# Patient Record
Sex: FEMALE | Race: WHITE | NOT HISPANIC OR LATINO | Employment: OTHER | URBAN - NONMETROPOLITAN AREA
[De-identification: names, ages, dates, MRNs, and addresses within clinical notes are randomized per-mention and may not be internally consistent; named-entity substitution may affect disease eponyms.]

---

## 2018-06-08 ENCOUNTER — TRANSCRIBE ORDERS (OUTPATIENT)
Dept: ADMINISTRATIVE | Facility: HOSPITAL | Age: 73
End: 2018-06-08

## 2018-06-08 DIAGNOSIS — Z78.0 POST-MENOPAUSAL: Primary | ICD-10-CM

## 2018-06-08 DIAGNOSIS — Z12.31 ENCOUNTER FOR SCREENING MAMMOGRAM FOR MALIGNANT NEOPLASM OF BREAST: ICD-10-CM

## 2019-06-14 ENCOUNTER — OFFICE VISIT (OUTPATIENT)
Dept: WOUND CARE | Facility: HOSPITAL | Age: 74
End: 2019-06-14

## 2019-06-14 ENCOUNTER — LAB REQUISITION (OUTPATIENT)
Dept: LAB | Facility: HOSPITAL | Age: 74
End: 2019-06-14

## 2019-06-14 DIAGNOSIS — Z00.00 ENCOUNTER FOR GENERAL ADULT MEDICAL EXAMINATION WITHOUT ABNORMAL FINDINGS: ICD-10-CM

## 2019-06-14 PROCEDURE — 87176 TISSUE HOMOGENIZATION CULTR: CPT | Performed by: NURSE PRACTITIONER

## 2019-06-14 PROCEDURE — 87205 SMEAR GRAM STAIN: CPT | Performed by: NURSE PRACTITIONER

## 2019-06-14 PROCEDURE — 87075 CULTR BACTERIA EXCEPT BLOOD: CPT | Performed by: NURSE PRACTITIONER

## 2019-06-14 PROCEDURE — G0463 HOSPITAL OUTPT CLINIC VISIT: HCPCS

## 2019-06-14 PROCEDURE — 87070 CULTURE OTHR SPECIMN AEROBIC: CPT | Performed by: NURSE PRACTITIONER

## 2019-06-17 ENCOUNTER — TRANSCRIBE ORDERS (OUTPATIENT)
Dept: ADMINISTRATIVE | Facility: HOSPITAL | Age: 74
End: 2019-06-17

## 2019-06-17 DIAGNOSIS — I87.2 CHRONIC VENOUS INSUFFICIENCY: Primary | ICD-10-CM

## 2019-06-17 LAB
BACTERIA SPEC AEROBE CULT: ABNORMAL
GRAM STN SPEC: ABNORMAL

## 2019-06-19 LAB — BACTERIA SPEC ANAEROBE CULT: NORMAL

## 2019-06-20 ENCOUNTER — TELEPHONE (OUTPATIENT)
Dept: VASCULAR SURGERY | Facility: CLINIC | Age: 74
End: 2019-06-20

## 2019-06-21 ENCOUNTER — OFFICE VISIT (OUTPATIENT)
Dept: WOUND CARE | Facility: HOSPITAL | Age: 74
End: 2019-06-21

## 2019-06-24 ENCOUNTER — HOSPITAL ENCOUNTER (OUTPATIENT)
Dept: ULTRASOUND IMAGING | Facility: HOSPITAL | Age: 74
Discharge: HOME | End: 2019-06-24
Admitting: NURSE PRACTITIONER

## 2019-06-24 ENCOUNTER — OFFICE VISIT (OUTPATIENT)
Dept: WOUND CARE | Facility: HOSPITAL | Age: 74
End: 2019-06-24

## 2019-06-24 ENCOUNTER — APPOINTMENT (OUTPATIENT)
Dept: WOUND CARE | Facility: HOSPITAL | Age: 74
End: 2019-06-24

## 2019-06-24 DIAGNOSIS — I87.2 CHRONIC VENOUS INSUFFICIENCY: ICD-10-CM

## 2019-06-24 PROCEDURE — 93970 EXTREMITY STUDY: CPT | Performed by: SURGERY

## 2019-06-24 PROCEDURE — 93970 EXTREMITY STUDY: CPT

## 2019-06-28 ENCOUNTER — OFFICE VISIT (OUTPATIENT)
Dept: WOUND CARE | Facility: HOSPITAL | Age: 74
End: 2019-06-28

## 2019-07-01 RX ORDER — IBUPROFEN 200 MG
200 TABLET ORAL EVERY 6 HOURS PRN
Status: ON HOLD | COMMUNITY
End: 2021-01-01

## 2019-07-01 RX ORDER — DOCUSATE SODIUM 100 MG/1
100 CAPSULE, LIQUID FILLED ORAL 2 TIMES DAILY
COMMUNITY

## 2019-07-01 RX ORDER — CHOLECALCIFEROL (VITAMIN D3) 25 MCG
1000 CAPSULE ORAL DAILY
Status: ON HOLD | COMMUNITY
End: 2021-01-01

## 2019-07-01 RX ORDER — CIPROFLOXACIN 500 MG/1
500 TABLET, FILM COATED ORAL 2 TIMES DAILY
COMMUNITY
End: 2019-07-16

## 2019-07-01 RX ORDER — LEVOTHYROXINE SODIUM 0.05 MG/1
50 TABLET ORAL DAILY
Status: ON HOLD | COMMUNITY
End: 2021-01-01

## 2019-07-01 RX ORDER — OXYCODONE AND ACETAMINOPHEN 10; 325 MG/1; MG/1
1 TABLET ORAL EVERY 8 HOURS PRN
COMMUNITY
End: 2022-01-01 | Stop reason: HOSPADM

## 2019-07-01 RX ORDER — UBIDECARENONE 100 MG
100 CAPSULE ORAL DAILY
Status: ON HOLD | COMMUNITY
End: 2021-01-01

## 2019-07-01 RX ORDER — VERAPAMIL HYDROCHLORIDE 80 MG/1
80 TABLET ORAL 2 TIMES DAILY
Status: ON HOLD | COMMUNITY
End: 2021-01-01

## 2019-07-01 RX ORDER — TRIAMTERENE AND HYDROCHLOROTHIAZIDE 75; 50 MG/1; MG/1
1 TABLET ORAL DAILY
COMMUNITY
End: 2022-01-01 | Stop reason: HOSPADM

## 2019-07-05 ENCOUNTER — OFFICE VISIT (OUTPATIENT)
Dept: WOUND CARE | Facility: HOSPITAL | Age: 74
End: 2019-07-05

## 2019-07-10 ENCOUNTER — TELEPHONE (OUTPATIENT)
Dept: VASCULAR SURGERY | Facility: CLINIC | Age: 74
End: 2019-07-10

## 2019-07-10 NOTE — TELEPHONE ENCOUNTER
Tried calling pt about upcoming appt 7/16 @ 215pm with dr caballero. Tried leaving pt voicemail but was not optional.

## 2019-07-11 ENCOUNTER — OFFICE VISIT (OUTPATIENT)
Dept: WOUND CARE | Facility: HOSPITAL | Age: 74
End: 2019-07-11

## 2019-07-11 ENCOUNTER — LAB REQUISITION (OUTPATIENT)
Dept: LAB | Facility: HOSPITAL | Age: 74
End: 2019-07-11

## 2019-07-11 DIAGNOSIS — Z00.00 ENCOUNTER FOR GENERAL ADULT MEDICAL EXAMINATION WITHOUT ABNORMAL FINDINGS: ICD-10-CM

## 2019-07-11 PROCEDURE — 87186 SC STD MICRODIL/AGAR DIL: CPT | Performed by: SURGERY

## 2019-07-11 PROCEDURE — G0463 HOSPITAL OUTPT CLINIC VISIT: HCPCS

## 2019-07-11 PROCEDURE — 87070 CULTURE OTHR SPECIMN AEROBIC: CPT | Performed by: SURGERY

## 2019-07-11 PROCEDURE — 87205 SMEAR GRAM STAIN: CPT | Performed by: SURGERY

## 2019-07-11 PROCEDURE — 87185 SC STD ENZYME DETCJ PER NZM: CPT | Performed by: SURGERY

## 2019-07-11 PROCEDURE — 87077 CULTURE AEROBIC IDENTIFY: CPT | Performed by: SURGERY

## 2019-07-15 LAB
BACTERIA SPEC AEROBE CULT: ABNORMAL
GRAM STN SPEC: ABNORMAL
GRAM STN SPEC: ABNORMAL

## 2019-07-16 ENCOUNTER — OFFICE VISIT (OUTPATIENT)
Dept: VASCULAR SURGERY | Facility: CLINIC | Age: 74
End: 2019-07-16

## 2019-07-16 VITALS
DIASTOLIC BLOOD PRESSURE: 80 MMHG | BODY MASS INDEX: 36.83 KG/M2 | HEART RATE: 85 BPM | WEIGHT: 243 LBS | HEIGHT: 68 IN | SYSTOLIC BLOOD PRESSURE: 130 MMHG | OXYGEN SATURATION: 91 %

## 2019-07-16 DIAGNOSIS — L97.919 CHRONIC VENOUS HYPERTENSION WITH ULCER AND INFLAMMATION INVOLVING RIGHT SIDE (HCC): Primary | ICD-10-CM

## 2019-07-16 DIAGNOSIS — I87.331 CHRONIC VENOUS HYPERTENSION WITH ULCER AND INFLAMMATION INVOLVING RIGHT SIDE (HCC): Primary | ICD-10-CM

## 2019-07-16 DIAGNOSIS — I87.2 VENOUS INSUFFICIENCY (CHRONIC) (PERIPHERAL): ICD-10-CM

## 2019-07-16 DIAGNOSIS — E78.00 PURE HYPERCHOLESTEROLEMIA: ICD-10-CM

## 2019-07-16 DIAGNOSIS — I10 ESSENTIAL (PRIMARY) HYPERTENSION: ICD-10-CM

## 2019-07-16 DIAGNOSIS — E11.65 TYPE 2 DIABETES MELLITUS WITH HYPERGLYCEMIA, UNSPECIFIED WHETHER LONG TERM INSULIN USE (HCC): ICD-10-CM

## 2019-07-16 PROCEDURE — 99204 OFFICE O/P NEW MOD 45 MIN: CPT | Performed by: SURGERY

## 2019-07-16 NOTE — PROGRESS NOTES
07/16/2019      Ann Elena APRN  [address] [address]    Alissa Dominguez  1945    Chief Complaint   Patient presents with   • Establish Care     referral from wound care for non-healing wound bilateral lower leg -US Venous Doppler Lower Extremity Bilateral 6/24/19       Dear HERMES Carlton    HPI  I had the pleasure of seeing your patient Alissa Dominguez in the office today.  Thank you kindly for this consultation.  As you recall, Alissa Dominguez is a 73 y.o.  female who you are currently following for wounds to bilateral lower extremities.  She has been in [facility] therapy for about a month now.  She does have a history of previous vein stripping to her right lower extremity.  She states the wounds have been present for a month and a half.  She states right leg looks worse even with compression therapy.  She did have cultures taken a few days ago that we reviewed in the office today.      Past Medical History:   Diagnosis Date   • Cellulitis of left lower limb    • Cellulitis of right lower limb    • COPD (chronic obstructive pulmonary disease) (CMS/HCC)    • DJD (degenerative joint disease), cervical     DJD C Spine/Spondylolysis, Cervical Region   • Essential (primary) hypertension    • GERD (gastroesophageal reflux disease)    • Hypothyroidism    • Major depressive disorder, recurrent, moderate (CMS/HCC)    • Morbid (severe) obesity due to excess calories (CMS/HCC)    • Obstructive sleep apnea    • Primary generalized (osteo)arthritis    • Pure hypercholesterolemia    • Tinea unguium    • Type 2 diabetes mellitus with hyperglycemia (CMS/HCC)    • Venous insufficiency (chronic) (peripheral)        Past Surgical History:   Procedure Laterality Date   • CATARACT EXTRACTION, BILATERAL     • ESSURE TUBAL LIGATION     • HYSTERECTOMY     • VARICOSE VEIN SURGERY Right        Family History   Problem Relation Age of Onset   • Hypertension Sister        Social History      Socioeconomic History   • Marital status:      Spouse name: Not on file   • Number of children: Not on file   • Years of education: Not on file   • Highest education level: Not on file   Tobacco Use   • Smoking status: Current Every Day Smoker     Packs/day: 1.00     Types: Electronic Cigarette   • Smokeless tobacco: Never Used   Substance and Sexual Activity   • Alcohol use: No     Frequency: Never   • Drug use: No   • Sexual activity: Defer     Allergies   Allergen Reactions   • Augmentin [Amoxicillin-Pot Clavulanate] Other (See Comments)     Not Specified   • Ceclor [Cefaclor] Other (See Comments)     Not Specified     • Codeine Other (See Comments)     Not Specified   • Darvon [Propoxyphene] Other (See Comments)     Not Specified   • Fulvicin P-G [Griseofulvin] Other (See Comments)     Not Specified   • Niacin Other (See Comments)     Not Specified   • Valium [Diazepam] Other (See Comments)     Not Specified        Current Outpatient Medications:   •  Calcium Carb-Cholecalciferol (CALCIUM 600+D3) 600-200 MG-UNIT tablet, Take  by mouth., Disp: , Rfl:   •  Cholecalciferol (VITAMIN D-3) 1000 units capsule, Take 1,000 Units by mouth Daily., Disp: , Rfl:   •  coenzyme Q10 100 MG capsule, Take 100 mg by mouth Daily., Disp: , Rfl:   •  docusate sodium (COLACE) 100 MG capsule, Take 100 mg by mouth 2 (Two) Times a Day., Disp: , Rfl:   •  Fexofenadine HCl (MUCINEX ALLERGY PO), Take 1,200 mg by mouth Daily., Disp: , Rfl:   •  Glucosamine-Chondroitin--400-200 MG tablet, Take  by mouth., Disp: , Rfl:   •  ibuprofen (ADVIL,MOTRIN) 200 MG tablet, Take 200 mg by mouth Every 6 (Six) Hours As Needed for Mild Pain ., Disp: , Rfl:   •  levothyroxine (SYNTHROID, LEVOTHROID) 50 MCG tablet, Take 50 mcg by mouth Daily., Disp: , Rfl:   •  O2 (OXYGEN), Inhale 2 L/min 1 (One) Time., Disp: , Rfl:   •  oxyCODONE-acetaminophen (PERCOCET)  MG per tablet, Take 1 tablet by mouth Every 6 (Six) Hours As Needed for Moderate  "Pain ., Disp: , Rfl:   •  triamterene-hydrochlorothiazide (MAXZIDE) 75-50 MG per tablet, Take 1 tablet by mouth Daily., Disp: , Rfl:   •  verapamil (CALAN) 80 MG tablet, Take 80 mg by mouth 2 (Two) Times a Day., Disp: , Rfl:         Review of Systems   Constitutional: Negative.    HENT: Negative.    Eyes: Negative.    Respiratory: Negative.    Cardiovascular: Positive for leg swelling.   Gastrointestinal: Negative.    Endocrine: Negative.    Genitourinary: Negative.    Musculoskeletal: Negative.    Skin: Positive for color change and wound.   Allergic/Immunologic: Negative.    Neurological: Negative.    Hematological: Negative.    Psychiatric/Behavioral: Negative.    All other systems reviewed and are negative.    /80 (BP Location: Right arm, Patient Position: Sitting, Cuff Size: Adult)   Pulse 85   Ht 171.5 cm (67.5\")   Wt 110 kg (243 lb)   SpO2 91%   BMI 37.50 kg/m²     Physical Exam   Constitutional: She is oriented to person, place, and time. She appears well-developed and well-nourished.   HENT:   Head: Normocephalic and atraumatic.   Eyes: Pupils are equal, round, and reactive to light. No scleral icterus.   Neck: Neck supple. No JVD present. Carotid bruit is not present. No thyromegaly present.   Cardiovascular: Normal rate, regular rhythm and normal heart sounds.   Pulses:       Carotid pulses are 2+ on the right side, and 2+ on the left side.       Femoral pulses are 2+ on the right side, and 2+ on the left side.       Popliteal pulses are 2+ on the right side, and 2+ on the left side.        Dorsalis pedis pulses are 2+ on the right side, and 2+ on the left side.        Posterior tibial pulses are 2+ on the right side, and 2+ on the left side.   Wounds to bilateral lower extremtiies   Pulmonary/Chest: Effort normal and breath sounds normal.   Abdominal: Soft. Bowel sounds are normal. She exhibits no distension, no abdominal bruit and no mass. There is no hepatosplenomegaly. There is no " tenderness.   Musculoskeletal: Normal range of motion. She exhibits edema.        Legs:  Lymphadenopathy:     She has no cervical adenopathy.   Neurological: She is alert and oriented to person, place, and time. She has normal strength. No cranial nerve deficit or sensory deficit.   Skin: Skin is warm, dry and intact.   Psychiatric: She has a normal mood and affect. Her behavior is normal. Judgment and thought content normal.   Nursing note and vitals reviewed.      Patient Active Problem List   Diagnosis   • Venous insufficiency (chronic) (peripheral)   • Type 2 diabetes mellitus with hyperglycemia (CMS/[facility])   • Pure hypercholesterolemia   • Essential (primary) hypertension        Diagnosis Plan   1. Chronic venous hypertension with ulcer and inflammation involving right side (CMS/[facility])     2. Venous insufficiency (chronic) (peripheral)     3. Pure hypercholesterolemia     4. Type 2 diabetes mellitus with hyperglycemia, unspecified whether long term insulin use (CMS/[facility])     5. Essential (primary) hypertension         Plan: After thoroughly evaluating Alissa Dominguez, I believe the best course of action is to proceed with dual antibiotic therapy.  We will start her on Levaquin as well as gentamicin cream to the wound.  We will place her back in compression with Ace wraps so she can check the wound daily.  She can follow-up back in wound care.  There is no further treatment necessary from a vascular standpoint.  Her veins have been stripped and she has palpable pedal pulses.  The patient is to continue taking their medications as previously discussed. I did discuss vascular risk factors as they pertain to the progression of vascular disease including controlling hypertension and hypercholesterolemia.  This was all discussed in full with complete understanding.  Thank you for allowing me to participate in the care of your patient.  Please do not hesitate to call with any questions or concerns.  We will keep you aware  of any further encounters with Alissa Dominguez.        Sincerely yours,         Kwame Corado, Roland Shaw MD

## 2019-07-17 ENCOUNTER — TELEPHONE (OUTPATIENT)
Dept: PODIATRY | Facility: CLINIC | Age: 74
End: 2019-07-17

## 2019-07-17 NOTE — TELEPHONE ENCOUNTER
Patient's daughter called regarding prescription that Dr. Ragland prescribed yesterday during her appointment with Dr. Corado. Wound Care had sent her daughter to our office, stating that our office would need to take care of this. I explained that I did not have any history of the prescription in our computer system. Ms. Lind stated that she would call wound care back.

## 2019-07-18 ENCOUNTER — OFFICE VISIT (OUTPATIENT)
Dept: WOUND CARE | Facility: HOSPITAL | Age: 74
End: 2019-07-18

## 2019-07-18 ENCOUNTER — TRANSCRIBE ORDERS (OUTPATIENT)
Dept: SLEEP MEDICINE | Facility: HOSPITAL | Age: 74
End: 2019-07-18

## 2019-07-18 DIAGNOSIS — R18.8 OTHER ASCITES: ICD-10-CM

## 2019-07-18 DIAGNOSIS — R60.9 EDEMA, UNSPECIFIED TYPE: ICD-10-CM

## 2019-07-18 DIAGNOSIS — R06.02 SOB (SHORTNESS OF BREATH): Primary | ICD-10-CM

## 2019-07-18 PROCEDURE — G0463 HOSPITAL OUTPT CLINIC VISIT: HCPCS

## 2019-07-25 ENCOUNTER — OFFICE VISIT (OUTPATIENT)
Dept: WOUND CARE | Facility: HOSPITAL | Age: 74
End: 2019-07-25

## 2019-07-25 ENCOUNTER — HOSPITAL ENCOUNTER (OUTPATIENT)
Dept: ULTRASOUND IMAGING | Facility: HOSPITAL | Age: 74
Discharge: HOME | End: 2019-07-25
Admitting: NURSE PRACTITIONER

## 2019-07-25 ENCOUNTER — HOSPITAL ENCOUNTER (OUTPATIENT)
Dept: CARDIOLOGY | Facility: HOSPITAL | Age: 74
Discharge: HOME | End: 2019-07-25

## 2019-07-25 VITALS
WEIGHT: 242.51 LBS | HEIGHT: 68 IN | DIASTOLIC BLOOD PRESSURE: 95 MMHG | BODY MASS INDEX: 36.75 KG/M2 | SYSTOLIC BLOOD PRESSURE: 152 MMHG

## 2019-07-25 DIAGNOSIS — R60.9 EDEMA, UNSPECIFIED TYPE: ICD-10-CM

## 2019-07-25 DIAGNOSIS — R06.02 SOB (SHORTNESS OF BREATH): ICD-10-CM

## 2019-07-25 PROCEDURE — 76705 ECHO EXAM OF ABDOMEN: CPT

## 2019-07-25 PROCEDURE — 93306 TTE W/DOPPLER COMPLETE: CPT

## 2019-07-25 PROCEDURE — 93306 TTE W/DOPPLER COMPLETE: CPT | Performed by: INTERNAL MEDICINE

## 2019-07-26 LAB
BH CV ECHO MEAS - AO MAX PG (FULL): 5.4 MMHG
BH CV ECHO MEAS - AO MAX PG: 7.7 MMHG
BH CV ECHO MEAS - AO MEAN PG (FULL): 3 MMHG
BH CV ECHO MEAS - AO MEAN PG: 4 MMHG
BH CV ECHO MEAS - AO ROOT AREA (BSA CORRECTED): 1.4
BH CV ECHO MEAS - AO ROOT AREA: 7.5 CM^2
BH CV ECHO MEAS - AO ROOT DIAM: 3.1 CM
BH CV ECHO MEAS - AO V2 MAX: 139 CM/SEC
BH CV ECHO MEAS - AO V2 MEAN: 93.4 CM/SEC
BH CV ECHO MEAS - AO V2 VTI: 27.8 CM
BH CV ECHO MEAS - AVA(I,A): 2.2 CM^2
BH CV ECHO MEAS - AVA(I,D): 2.2 CM^2
BH CV ECHO MEAS - AVA(V,A): 1.9 CM^2
BH CV ECHO MEAS - AVA(V,D): 1.9 CM^2
BH CV ECHO MEAS - BSA(HAYCOCK): 2.3 M^2
BH CV ECHO MEAS - BSA: 2.2 M^2
BH CV ECHO MEAS - BZI_BMI: 37.9 KILOGRAMS/M^2
BH CV ECHO MEAS - BZI_METRIC_HEIGHT: 170.2 CM
BH CV ECHO MEAS - BZI_METRIC_WEIGHT: 109.8 KG
BH CV ECHO MEAS - EDV(MOD-SP4): 85.9 ML
BH CV ECHO MEAS - EF(MOD-SP4): 65.4 %
BH CV ECHO MEAS - ESV(MOD-SP4): 29.7 ML
BH CV ECHO MEAS - LAT PEAK E' VEL: 8.8 CM/SEC
BH CV ECHO MEAS - LV DIASTOLIC VOL/BSA (35-75): 39.2 ML/M^2
BH CV ECHO MEAS - LV MAX PG: 2.4 MMHG
BH CV ECHO MEAS - LV MEAN PG: 1 MMHG
BH CV ECHO MEAS - LV SYSTOLIC VOL/BSA (12-30): 13.5 ML/M^2
BH CV ECHO MEAS - LV V1 MAX: 76.9 CM/SEC
BH CV ECHO MEAS - LV V1 MEAN: 56.6 CM/SEC
BH CV ECHO MEAS - LV V1 VTI: 17.7 CM
BH CV ECHO MEAS - LVLD AP4: 8.3 CM
BH CV ECHO MEAS - LVLS AP4: 7.3 CM
BH CV ECHO MEAS - LVOT AREA (M): 3.5 CM^2
BH CV ECHO MEAS - LVOT AREA: 3.5 CM^2
BH CV ECHO MEAS - LVOT DIAM: 2.1 CM
BH CV ECHO MEAS - MED PEAK E' VEL: 4.24 CM/SEC
BH CV ECHO MEAS - MV A MAX VEL: 86.3 CM/SEC
BH CV ECHO MEAS - MV DEC SLOPE: 214 CM/SEC^2
BH CV ECHO MEAS - MV DEC TIME: 0.25 SEC
BH CV ECHO MEAS - MV E MAX VEL: 54.3 CM/SEC
BH CV ECHO MEAS - MV E/A: 0.63
BH CV ECHO MEAS - RAP SYSTOLE: 5 MMHG
BH CV ECHO MEAS - RVSP: 41 MMHG
BH CV ECHO MEAS - SI(AO): 95.7 ML/M^2
BH CV ECHO MEAS - SI(LVOT): 28 ML/M^2
BH CV ECHO MEAS - SI(MOD-SP4): 25.6 ML/M^2
BH CV ECHO MEAS - SV(AO): 209.8 ML
BH CV ECHO MEAS - SV(LVOT): 61.3 ML
BH CV ECHO MEAS - SV(MOD-SP4): 56.2 ML
BH CV ECHO MEAS - TR MAX VEL: 300 CM/SEC
BH CV ECHO MEASUREMENTS AVERAGE E/E' RATIO: 8.33
LEFT ATRIUM VOLUME INDEX: 20 ML/M2
LEFT ATRIUM VOLUME: 43.8 CM3
MAXIMAL PREDICTED HEART RATE: 145 BPM
STRESS TARGET HR: 123 BPM

## 2019-08-01 ENCOUNTER — OFFICE VISIT (OUTPATIENT)
Dept: WOUND CARE | Facility: HOSPITAL | Age: 74
End: 2019-08-01

## 2019-08-08 ENCOUNTER — OFFICE VISIT (OUTPATIENT)
Dept: WOUND CARE | Facility: HOSPITAL | Age: 74
End: 2019-08-08

## 2019-08-08 ENCOUNTER — TRANSCRIBE ORDERS (OUTPATIENT)
Dept: ADMINISTRATIVE | Facility: HOSPITAL | Age: 74
End: 2019-08-08

## 2019-08-08 ENCOUNTER — LAB (OUTPATIENT)
Dept: LAB | Facility: HOSPITAL | Age: 74
End: 2019-08-08

## 2019-08-08 DIAGNOSIS — L97.812 ULCER OF RIGHT PRETIBIAL REGION, WITH FAT LAYER EXPOSED (HCC): Primary | ICD-10-CM

## 2019-08-08 DIAGNOSIS — L97.812: Primary | ICD-10-CM

## 2019-08-08 LAB
ALBUMIN SERPL-MCNC: 4 G/DL (ref 3.5–5)
ALBUMIN/GLOB SERPL: 1.1 G/DL (ref 1.1–2.5)
ALP SERPL-CCNC: 58 U/L (ref 24–120)
ALT SERPL W P-5'-P-CCNC: 18 U/L (ref 0–54)
ANION GAP SERPL CALCULATED.3IONS-SCNC: 10 MMOL/L (ref 4–13)
AST SERPL-CCNC: 20 U/L (ref 7–45)
BILIRUB SERPL-MCNC: 0.5 MG/DL (ref 0.1–1)
BUN BLD-MCNC: 13 MG/DL (ref 5–21)
BUN/CREAT SERPL: 14.6 (ref 7–25)
CALCIUM SPEC-SCNC: 9.4 MG/DL (ref 8.4–10.4)
CHLORIDE SERPL-SCNC: 94 MMOL/L (ref 98–110)
CO2 SERPL-SCNC: 32 MMOL/L (ref 24–31)
CREAT BLD-MCNC: 0.89 MG/DL (ref 0.5–1.4)
GFR SERPL CREATININE-BSD FRML MDRD: 62 ML/MIN/1.73
GLOBULIN UR ELPH-MCNC: 3.5 GM/DL
GLUCOSE BLD-MCNC: 154 MG/DL (ref 70–100)
POTASSIUM BLD-SCNC: 4 MMOL/L (ref 3.5–5.3)
PREALB SERPL-MCNC: 21.6 MG/DL (ref 18–36)
PROT SERPL-MCNC: 7.5 G/DL (ref 6.3–8.7)
SODIUM BLD-SCNC: 136 MMOL/L (ref 135–145)

## 2019-08-08 PROCEDURE — 80053 COMPREHEN METABOLIC PANEL: CPT | Performed by: PODIATRIST

## 2019-08-08 PROCEDURE — 36415 COLL VENOUS BLD VENIPUNCTURE: CPT | Performed by: PODIATRIST

## 2019-08-08 PROCEDURE — 84134 ASSAY OF PREALBUMIN: CPT | Performed by: PODIATRIST

## 2019-08-15 ENCOUNTER — OFFICE VISIT (OUTPATIENT)
Dept: WOUND CARE | Facility: HOSPITAL | Age: 74
End: 2019-08-15

## 2019-08-15 PROCEDURE — G0463 HOSPITAL OUTPT CLINIC VISIT: HCPCS

## 2019-08-22 ENCOUNTER — OFFICE VISIT (OUTPATIENT)
Dept: WOUND CARE | Facility: HOSPITAL | Age: 74
End: 2019-08-22

## 2019-08-29 ENCOUNTER — OFFICE VISIT (OUTPATIENT)
Dept: WOUND CARE | Facility: HOSPITAL | Age: 74
End: 2019-08-29

## 2019-08-29 PROCEDURE — G0463 HOSPITAL OUTPT CLINIC VISIT: HCPCS

## 2019-09-03 NOTE — PROGRESS NOTES
[facility] - PODIATRY    Today's Date: 09/06/19    Patient Name: Alissa Dominguez  MRN: [id number]  CSN: [id number]  PCP: Roland Cavazos MD  Referring Provider: Roland Cavazos MD    SUBJECTIVE     Chief Complaint   Patient presents with   • Establish Care     Patient is here to establish care. Patinet complains of painful toenails. Pain scale: 5/10   • Diabetes     Unsure of last BG level. PCP: Dr. Roland Cavazos last visit 08/15/2019     HPI: Alissa Dominguez, a 73 y.o.female, comes to clinic as a(n) established patient complaining of painful toenails. Patient has h/o COPD, DJD, HTN, GERD, hypothyroid, depression, Obesity, Sleep apnea, OA, hypercholesterolemia, borderline DM, Venous insufficiency. Claims to be borderline diabetic and does not check BG or take medication. Denies numbness in feet. Relates chronic ulcerations to both lower legs from venous issues. She is currently undergoing treatment at [facility], followed with vascular surgery and uses and lymphedema pump. Notes that her toenails are very long, thick, discolored and crumbly. She is unable to care for them herself. Admits pain at 5/10 level and described as aching and throbbing. Denies previous treatment. Denies any constitutional symptoms. No other pedal complaints at this time.    Past Medical History:   Diagnosis Date   • Cellulitis of left lower limb    • Cellulitis of right lower limb    • COPD (chronic obstructive pulmonary disease) (CMS/HCC)    • DJD (degenerative joint disease), cervical     DJD C Spine/Spondylolysis, Cervical Region   • Essential (primary) hypertension    • GERD (gastroesophageal reflux disease)    • Hypothyroidism    • Major depressive disorder, recurrent, moderate (CMS/HCC)    • Morbid (severe) obesity due to excess calories (CMS/HCC)    • Obstructive sleep apnea    • Primary generalized (osteo)arthritis    • Pure hypercholesterolemia    • Tinea unguium    • Type 2 diabetes mellitus with  hyperglycemia (CMS/HCC)    • Venous insufficiency (chronic) (peripheral)      Past Surgical History:   Procedure Laterality Date   • CATARACT EXTRACTION, BILATERAL     • ESSURE TUBAL LIGATION     • HYSTERECTOMY     • VARICOSE VEIN SURGERY Right      Family History   Problem Relation Age of Onset   • Hypertension Sister      Social History     Socioeconomic History   • Marital status:      Spouse name: Not on file   • Number of children: Not on file   • Years of education: Not on file   • Highest education level: Not on file   Tobacco Use   • Smoking status: Current Every Day Smoker     Packs/day: 1.00     Types: Electronic Cigarette   • Smokeless tobacco: Never Used   Substance and Sexual Activity   • Alcohol use: No     Frequency: Never   • Drug use: No   • Sexual activity: Defer     Allergies   Allergen Reactions   • Augmentin [Amoxicillin-Pot Clavulanate] Other (See Comments)     Not Specified   • Ceclor [Cefaclor] Other (See Comments)     Not Specified     • Codeine Other (See Comments)     Not Specified   • Darvon [Propoxyphene] Other (See Comments)     Not Specified   • Fulvicin P-G [Griseofulvin] Other (See Comments)     Not Specified   • Niacin Other (See Comments)     Not Specified   • Valium [Diazepam] Other (See Comments)     Not Specified     Current Outpatient Medications   Medication Sig Dispense Refill   • Calcium Carb-Cholecalciferol (CALCIUM 600+D3) 600-200 MG-UNIT tablet Take  by mouth.     • Cholecalciferol (VITAMIN D-3) 1000 units capsule Take 1,000 Units by mouth Daily.     • coenzyme Q10 100 MG capsule Take 100 mg by mouth Daily.     • docusate sodium (COLACE) 100 MG capsule Take 100 mg by mouth 2 (Two) Times a Day.     • Fexofenadine HCl (MUCINEX ALLERGY PO) Take 1,200 mg by mouth Daily.     • Glucosamine-Chondroitin--400-200 MG tablet Take  by mouth.     • ibuprofen (ADVIL,MOTRIN) 200 MG tablet Take 200 mg by mouth Every 6 (Six) Hours As Needed for Mild Pain .     •  levothyroxine (SYNTHROID, LEVOTHROID) 50 MCG tablet Take 50 mcg by mouth Daily.     • O2 (OXYGEN) Inhale 2 L/min 1 (One) Time.     • oxyCODONE-acetaminophen (PERCOCET)  MG per tablet Take 1 tablet by mouth Every 6 (Six) Hours As Needed for Moderate Pain .     • triamterene-hydrochlorothiazide (MAXZIDE) 75-50 MG per tablet Take 1 tablet by mouth Daily.     • verapamil (CALAN) 80 MG tablet Take 80 mg by mouth 2 (Two) Times a Day.       No current facility-administered medications for this visit.      Review of Systems   Constitutional: Negative for chills and fever.   HENT: Negative for congestion.    Respiratory: Negative for shortness of breath.    Cardiovascular: Positive for leg swelling. Negative for chest pain.   Gastrointestinal: Negative for constipation, diarrhea, nausea and vomiting.   Musculoskeletal: Positive for arthralgias and gait problem.        Foot pain   Skin: Positive for wound.   Neurological: Negative for numbness.       OBJECTIVE     Vitals:    09/06/19 1352   BP: 130/80   Pulse: 88   SpO2: 92%       PHYSICAL EXAM  GEN:   Accompanied by none.     General Exam  Orientation: alert and oriented to person, place, and time   Affect: appropriate   Gait: steppage   Assistance: cane use   Shoe Gear: casual shoes    Foot/Ankle Exam  Inspection and Palpation  Ecchymosis - Right: none Left: none  Tenderness - Right: (Toenails) Left: (Toenails)  Swelling - Right: none   Left: none    Arch - Right: pes planus Left: pes planus  Hallux Valgus - Right: yes Left: yes  Hallux Limitus - Right: no Left: no  Skin - Right: drainage, maceration and ulcer (bandaged) Left: drainage, maceration and ulcer (Bandaged)  Nails -  Right: abnormally thick, dystrophic nails and onychomycosis Left: abnormally thick, dystrophic nails and onychomycosis     Vascular  Dorsalis Pedis - Right: 2+ Left: 2+  Posterior Tibial - Right: 2+ Left: 2+  Skin Temperature -  Right: warm  Left: warm    CFT - Right: 3 Left: 3  Varicosities -   Right: moderate varicosities  Left: moderate varicosities      Neurologic  Saphenous Nerve Sensation  - Right: normal Left: normal  Tibial Nerve Sensation - Right: normal Left: normal  Superficial Peroneal Nerve Sensation - Right: normal Left: normal  Deep Peroneal Nerve Sensation - Right: normal Left: normal  Sural Nerve Sensation - Right: normal Left: normal  Protective Sensation using Montgomery-Bertin Monofilament - Right: 10 Left: 10    Muscle Strength  Dorsiflexion - Right: 4+ Left: 4+  Plantar Flexion - Right: 4+ Left: 4+  Eversion - Right: 4+ Left: 4+  Inversion - Right: 4+ Left: 4+    Range of Motion  Normal right ankle ROM  Normal left ankle ROM            RADIOLOGY/NUCLEAR:  No results found.    LABORATORY/CULTURE RESULTS:      PATHOLOGY RESULTS:       ASSESSMENT/PLAN     Alissa was seen today for establish care and diabetes.    Diagnoses and all orders for this visit:    Onychomycosis    Venous insufficiency (chronic) (peripheral)    Borderline diabetic    Hallux valgus, right    Hallux valgus, left    Venous stasis ulcer of ankle with fat layer exposed with varicose veins, unspecified laterality (CMS/HCC)      Comprehensive lower extremity examination and evaluation was performed.  Discussed findings and treatment plan including risks, benefits, and treatment options with patient in detail. Patient agreed with treatment plan.  After verbal consent obtained, nail(s) x10 debrided of length and thickness with nail nipper without incidence  Patient may maintain nails and calluses at home utilizing emery board or pumice stone between visits as needed   Continue with vascular surgery  Continue with wound care for treatment of BLE wounds.   An After Visit Summary was printed and given to the patient at discharge, including (if requested) any available informative/educational handouts regarding diagnosis, treatment, or medications. All questions were answered to patient/family satisfaction. Should symptoms fail to  improve or worsen they agree to call or return to clinic or to go to the Emergency Department. Discussed the importance of following up with any needed screening tests/labs/specialist appointments and any requested follow-up recommended by me today. Importance of maintaining follow-up discussed and patient accepts that missed appointments can delay diagnosis and potentially lead to worsening of conditions.  Return in about 3 months (around 12/6/2019)., or sooner if acute issues arise.    Lab Frequency Next Occurrence       This document has been electronically signed by Jaspreet Ragland DPM on September 6, 2019 2:21 PM

## 2019-09-05 ENCOUNTER — TELEPHONE (OUTPATIENT)
Dept: PODIATRY | Facility: CLINIC | Age: 74
End: 2019-09-05

## 2019-09-05 ENCOUNTER — OFFICE VISIT (OUTPATIENT)
Dept: WOUND CARE | Facility: HOSPITAL | Age: 74
End: 2019-09-05

## 2019-09-05 PROCEDURE — G0463 HOSPITAL OUTPT CLINIC VISIT: HCPCS

## 2019-09-05 NOTE — TELEPHONE ENCOUNTER
Unable to reach patient to remind her of appointment with Dr. Ishmael Ragland on September 6, 2019.

## 2019-09-06 ENCOUNTER — OFFICE VISIT (OUTPATIENT)
Dept: PODIATRY | Facility: CLINIC | Age: 74
End: 2019-09-06

## 2019-09-06 VITALS
BODY MASS INDEX: 36.68 KG/M2 | HEIGHT: 68 IN | SYSTOLIC BLOOD PRESSURE: 130 MMHG | WEIGHT: 242 LBS | DIASTOLIC BLOOD PRESSURE: 80 MMHG | HEART RATE: 88 BPM | OXYGEN SATURATION: 92 %

## 2019-09-06 DIAGNOSIS — B35.1 ONYCHOMYCOSIS: Primary | ICD-10-CM

## 2019-09-06 DIAGNOSIS — R73.03 BORDERLINE DIABETIC: ICD-10-CM

## 2019-09-06 DIAGNOSIS — L97.302 VENOUS STASIS ULCER OF ANKLE WITH FAT LAYER EXPOSED WITH VARICOSE VEINS, UNSPECIFIED LATERALITY (HCC): ICD-10-CM

## 2019-09-06 DIAGNOSIS — I87.2 VENOUS INSUFFICIENCY (CHRONIC) (PERIPHERAL): ICD-10-CM

## 2019-09-06 DIAGNOSIS — M20.12 HALLUX VALGUS, LEFT: ICD-10-CM

## 2019-09-06 DIAGNOSIS — I83.003 VENOUS STASIS ULCER OF ANKLE WITH FAT LAYER EXPOSED WITH VARICOSE VEINS, UNSPECIFIED LATERALITY (HCC): ICD-10-CM

## 2019-09-06 DIAGNOSIS — M20.11 HALLUX VALGUS, RIGHT: ICD-10-CM

## 2019-09-06 PROCEDURE — 11721 DEBRIDE NAIL 6 OR MORE: CPT | Performed by: PODIATRIST

## 2019-09-06 PROCEDURE — 99213 OFFICE O/P EST LOW 20 MIN: CPT | Performed by: PODIATRIST

## 2019-09-12 ENCOUNTER — OFFICE VISIT (OUTPATIENT)
Dept: WOUND CARE | Facility: HOSPITAL | Age: 74
End: 2019-09-12

## 2019-09-12 PROCEDURE — G0463 HOSPITAL OUTPT CLINIC VISIT: HCPCS

## 2019-09-19 ENCOUNTER — OFFICE VISIT (OUTPATIENT)
Dept: WOUND CARE | Facility: HOSPITAL | Age: 74
End: 2019-09-19

## 2019-09-19 PROCEDURE — G0463 HOSPITAL OUTPT CLINIC VISIT: HCPCS

## 2019-09-25 ENCOUNTER — TELEPHONE (OUTPATIENT)
Dept: VASCULAR SURGERY | Facility: CLINIC | Age: 74
End: 2019-09-25

## 2019-09-26 ENCOUNTER — OFFICE VISIT (OUTPATIENT)
Dept: WOUND CARE | Facility: HOSPITAL | Age: 74
End: 2019-09-26

## 2019-09-26 PROCEDURE — G0463 HOSPITAL OUTPT CLINIC VISIT: HCPCS

## 2019-10-03 ENCOUNTER — LAB REQUISITION (OUTPATIENT)
Dept: LAB | Facility: HOSPITAL | Age: 74
End: 2019-10-03

## 2019-10-03 ENCOUNTER — OFFICE VISIT (OUTPATIENT)
Dept: WOUND CARE | Facility: HOSPITAL | Age: 74
End: 2019-10-03

## 2019-10-03 DIAGNOSIS — Z00.00 ENCOUNTER FOR GENERAL ADULT MEDICAL EXAMINATION WITHOUT ABNORMAL FINDINGS: ICD-10-CM

## 2019-10-03 PROCEDURE — 87070 CULTURE OTHR SPECIMN AEROBIC: CPT | Performed by: NURSE PRACTITIONER

## 2019-10-03 PROCEDURE — 87186 SC STD MICRODIL/AGAR DIL: CPT | Performed by: NURSE PRACTITIONER

## 2019-10-03 PROCEDURE — G0463 HOSPITAL OUTPT CLINIC VISIT: HCPCS

## 2019-10-03 PROCEDURE — 87205 SMEAR GRAM STAIN: CPT | Performed by: NURSE PRACTITIONER

## 2019-10-03 PROCEDURE — 87077 CULTURE AEROBIC IDENTIFY: CPT | Performed by: NURSE PRACTITIONER

## 2019-10-06 LAB
BACTERIA SPEC AEROBE CULT: ABNORMAL
BACTERIA SPEC AEROBE CULT: ABNORMAL
GRAM STN SPEC: ABNORMAL
GRAM STN SPEC: ABNORMAL

## 2019-10-10 ENCOUNTER — OFFICE VISIT (OUTPATIENT)
Dept: WOUND CARE | Facility: HOSPITAL | Age: 74
End: 2019-10-10

## 2019-10-10 PROCEDURE — G0463 HOSPITAL OUTPT CLINIC VISIT: HCPCS

## 2019-10-17 ENCOUNTER — OFFICE VISIT (OUTPATIENT)
Dept: WOUND CARE | Facility: HOSPITAL | Age: 74
End: 2019-10-17

## 2019-10-24 ENCOUNTER — OFFICE VISIT (OUTPATIENT)
Dept: WOUND CARE | Facility: HOSPITAL | Age: 74
End: 2019-10-24

## 2019-10-31 ENCOUNTER — OFFICE VISIT (OUTPATIENT)
Dept: WOUND CARE | Facility: HOSPITAL | Age: 74
End: 2019-10-31

## 2019-10-31 PROCEDURE — G0463 HOSPITAL OUTPT CLINIC VISIT: HCPCS

## 2019-11-07 ENCOUNTER — OFFICE VISIT (OUTPATIENT)
Dept: WOUND CARE | Facility: HOSPITAL | Age: 74
End: 2019-11-07

## 2019-11-14 ENCOUNTER — OFFICE VISIT (OUTPATIENT)
Dept: WOUND CARE | Facility: HOSPITAL | Age: 74
End: 2019-11-14

## 2019-11-21 ENCOUNTER — OFFICE VISIT (OUTPATIENT)
Dept: WOUND CARE | Facility: HOSPITAL | Age: 74
End: 2019-11-21

## 2019-12-05 ENCOUNTER — OFFICE VISIT (OUTPATIENT)
Dept: WOUND CARE | Facility: HOSPITAL | Age: 74
End: 2019-12-05

## 2019-12-19 ENCOUNTER — OFFICE VISIT (OUTPATIENT)
Dept: WOUND CARE | Facility: HOSPITAL | Age: 74
End: 2019-12-19

## 2020-01-02 ENCOUNTER — OFFICE VISIT (OUTPATIENT)
Dept: WOUND CARE | Facility: HOSPITAL | Age: 75
End: 2020-01-02

## 2020-01-02 PROCEDURE — G0463 HOSPITAL OUTPT CLINIC VISIT: HCPCS

## 2020-01-06 ENCOUNTER — OFFICE VISIT (OUTPATIENT)
Dept: WOUND CARE | Facility: HOSPITAL | Age: 75
End: 2020-01-06

## 2020-01-13 ENCOUNTER — OFFICE VISIT (OUTPATIENT)
Dept: WOUND CARE | Facility: HOSPITAL | Age: 75
End: 2020-01-13

## 2020-01-20 ENCOUNTER — OFFICE VISIT (OUTPATIENT)
Dept: WOUND CARE | Facility: HOSPITAL | Age: 75
End: 2020-01-20

## 2020-01-27 ENCOUNTER — OFFICE VISIT (OUTPATIENT)
Dept: WOUND CARE | Facility: HOSPITAL | Age: 75
End: 2020-01-27

## 2020-01-27 ENCOUNTER — TELEPHONE (OUTPATIENT)
Dept: PODIATRY | Facility: CLINIC | Age: 75
End: 2020-01-27

## 2020-01-27 NOTE — PROGRESS NOTES
[facility] - PODIATRY    Today's Date: 01/28/20    Patient Name: Alissa Dominguez  MRN: [id number]  CSN: [id number]  PCP: Roland Cavazos MD  Referring Provider: No ref. provider found    SUBJECTIVE     Chief Complaint   Patient presents with   • Follow-up     pt c/o long, thickened toenails - left foot calluses - denies pain at present time    • Diabetes     last stated A1C ~ 6.7   PCP Dr. Cavazos last visit 9/12/19     HPI: Alissa Dominguez, a 74 y.o.female, comes to clinic as a(n) established patient complaining of painful toenails. Patient has h/o COPD, DJD, HTN, GERD, hypothyroid, depression, Obesity, Sleep apnea, OA, hypercholesterolemia, borderline DM, Venous insufficiency. Claims to be borderline diabetic and does not check BG or take medication. Last A1C of 6.7%. Denies numbness in feet. Relates chronic ulcerations to both lower legs from venous issues. She continues with treatment at [facility], followed with vascular surgery and uses lymphedema pump. Notes that her toenails are very long, thick, discolored and crumbly. She is unable to care for them herself. Notes 2 calluses to her left foot. Denies pain currently. Relates previous treatment(s) including foot care by podiatrist. Denies any constitutional symptoms. No other pedal complaints at this time.    Past Medical History:   Diagnosis Date   • Cellulitis of left lower limb    • Cellulitis of right lower limb    • COPD (chronic obstructive pulmonary disease) (CMS/HCC)    • DJD (degenerative joint disease), cervical     DJD C Spine/Spondylolysis, Cervical Region   • Essential (primary) hypertension    • GERD (gastroesophageal reflux disease)    • Hypothyroidism    • Major depressive disorder, recurrent, moderate (CMS/HCC)    • Morbid (severe) obesity due to excess calories (CMS/HCC)    • Obstructive sleep apnea    • Primary generalized (osteo)arthritis    • Pure hypercholesterolemia    • Tinea unguium    • Type 2 diabetes mellitus  with hyperglycemia (CMS/HCC)    • Venous insufficiency (chronic) (peripheral)      Past Surgical History:   Procedure Laterality Date   • CATARACT EXTRACTION, BILATERAL     • ESSURE TUBAL LIGATION     • HYSTERECTOMY     • VARICOSE VEIN SURGERY Right      Family History   Problem Relation Age of Onset   • Hypertension Sister      Social History     Socioeconomic History   • Marital status:      Spouse name: Not on file   • Number of children: Not on file   • Years of education: Not on file   • Highest education level: Not on file   Tobacco Use   • Smoking status: Current Every Day Smoker     Packs/day: 1.00     Types: Electronic Cigarette   • Smokeless tobacco: Never Used   Substance and Sexual Activity   • Alcohol use: No     Frequency: Never   • Drug use: No   • Sexual activity: Defer     Allergies   Allergen Reactions   • Augmentin [Amoxicillin-Pot Clavulanate] Other (See Comments)     Not Specified   • Ceclor [Cefaclor] Other (See Comments)     Not Specified     • Codeine Other (See Comments)     Not Specified   • Darvon [Propoxyphene] Other (See Comments)     Not Specified   • Fulvicin P-G [Griseofulvin] Other (See Comments)     Not Specified   • Niacin Other (See Comments)     Not Specified   • Valium [Diazepam] Other (See Comments)     Not Specified     Current Outpatient Medications   Medication Sig Dispense Refill   • coenzyme Q10 100 MG capsule Take 100 mg by mouth Daily.     • docusate sodium (COLACE) 100 MG capsule Take 100 mg by mouth 2 (Two) Times a Day.     • Fexofenadine HCl (MUCINEX ALLERGY PO) Take 1,200 mg by mouth Daily.     • Garlic 100 MG tablet Take  by mouth.     • Glucosamine-Chondroitin--400-200 MG tablet Take  by mouth.     • ibuprofen (ADVIL,MOTRIN) 200 MG tablet Take 200 mg by mouth Every 6 (Six) Hours As Needed for Mild Pain .     • levothyroxine (SYNTHROID, LEVOTHROID) 50 MCG tablet Take 50 mcg by mouth Daily.     • O2 (OXYGEN) Inhale 2 L/min 1 (One) Time.     •  oxyCODONE-acetaminophen (PERCOCET)  MG per tablet Take 1 tablet by mouth Every 6 (Six) Hours As Needed for Moderate Pain .     • triamterene-hydrochlorothiazide (MAXZIDE) 75-50 MG per tablet Take 1 tablet by mouth Daily.     • verapamil (CALAN) 80 MG tablet Take 80 mg by mouth 2 (Two) Times a Day.     • Calcium Carb-Cholecalciferol (CALCIUM 600+D3) 600-200 MG-UNIT tablet Take  by mouth.     • Cholecalciferol (VITAMIN D-3) 1000 units capsule Take 1,000 Units by mouth Daily.       No current facility-administered medications for this visit.      Review of Systems   Constitutional: Negative for chills and fever.   HENT: Negative for congestion.    Respiratory: Negative for shortness of breath.    Cardiovascular: Positive for leg swelling. Negative for chest pain.   Gastrointestinal: Negative for constipation, diarrhea, nausea and vomiting.   Musculoskeletal: Positive for arthralgias and gait problem.        Foot pain   Skin: Positive for wound.   Neurological: Negative for numbness.       OBJECTIVE     Vitals:    20 1117   BP: 122/80   Pulse: 72   SpO2: 90%       PHYSICAL EXAM  GEN:   Accompanied by none.     Foot/Ankle Exam:       General:   Orientation: AAOx3    Affect: appropriate    Gait: steppage    Assistance: cane    Shoe Gear:  Casual shoes    VASCULAR      Right Foot Vascularity   Dorsalis pedis:  2+  Posterior tibial:  2+  Skin Temperature: warm    Edema Gradin+ and pitting  CFT:  3  Varicosities: moderate varicosities       Left Foot Vascularity   Dorsalis pedis:  2+  Posterior tibial:  2+  Skin Temperature: warm    Edema Gradin+ and pitting  CFT:  3  Varicosities: moderate varicosities        NEUROLOGIC     Right Foot Neurologic   Light touch sensation:  Diminished  Vibratory sensation:  Normal  Hot/Cold sensation: normal    Protective Sensation using [place]-Bertin Monofilament:  10     Left Foot Neurologic   Light touch sensation:  Diminished  Vibratory sensation:  Normal  Hot/cold  sensation: normal    Protective Sensation using [place]-Bertin Monofilament:  10     MUSCULOSKELETAL      Right Foot Musculoskeletal   Ecchymosis:  None  Tenderness: toenails    Arch:  Pes planus  Hallux valgus: Yes    Hallux limitus: No       Left Foot Musculoskeletal   Ecchymosis:  None  Tenderness: left foot callus and toenails    Arch:  Pes planus  Hallux valgus: Yes    Hallux limitus: No       MUSCLE STRENGTH     Right Foot Muscle Strength   Foot dorsiflexion:  4+  Foot plantar flexion:  4+  Foot inversion:  4+  Foot eversion:  4+     Left Foot Muscle Strength   Foot dorsiflexion:  4+  Foot plantar flexion:  4+  Foot inversion:  4+  Foot eversion:  4+     RANGE OF MOTION      Right Foot Range of Motion   Foot and ankle ROM within normal limits       Left Foot Range of Motion   Foot and ankle ROM within normal limits       DERMATOLOGIC     Right Foot Dermatologic   Skin: drainage, maceration and ulcer    Skin comment:  Bandaged  Nails: onychomycosis, abnormally thick and dystrophic nails       Left Foot Dermatologic   Skin: drainage, maceration and ulcer    Skin comment:  Bandaged  Nails: onychomycosis, abnormally thick and dystrophic nails       Image:     RADIOLOGY/NUCLEAR:  No results found.    LABORATORY/CULTURE RESULTS:      PATHOLOGY RESULTS:       ASSESSMENT/PLAN     Alissa was seen today for follow-up and diabetes.    Diagnoses and all orders for this visit:    Onychomycosis    Venous insufficiency (chronic) (peripheral)    Borderline diabetic    Hallux valgus, right    Hallux valgus, left    Foot callus    Foot pain, bilateral      Comprehensive lower extremity examination and evaluation was performed.  Discussed findings and treatment plan including risks, benefits, and treatment options with patient in detail. Patient agreed with treatment plan.  After verbal consent obtained, nail(s) x10 debrided of length and thickness with nail nipper without incidence  After verbal consent obtained, calluses x2  pared utilizing dermal curette and/or scalpel without incidence  Patient may maintain nails and calluses at home utilizing emery board or pumice stone between visits as needed   Continue with vascular surgery  Continue with wound care for treatment of BLE wounds.   An After Visit Summary was printed and given to the patient at discharge, including (if requested) any available informative/educational handouts regarding diagnosis, treatment, or medications. All questions were answered to patient/family satisfaction. Should symptoms fail to improve or worsen they agree to call or return to clinic or to go to the Emergency Department. Discussed the importance of following up with any needed screening tests/labs/specialist appointments and any requested follow-up recommended by me today. Importance of maintaining follow-up discussed and patient accepts that missed appointments can delay diagnosis and potentially lead to worsening of conditions.  Return in about 3 months (around 4/28/2020)., or sooner if acute issues arise.    Lab Frequency Next Occurrence       This document has been electronically signed by Jasperet Ragland DPM on January 28, 2020 11:43 AM

## 2020-01-28 ENCOUNTER — OFFICE VISIT (OUTPATIENT)
Dept: PODIATRY | Facility: CLINIC | Age: 75
End: 2020-01-28

## 2020-01-28 VITALS
DIASTOLIC BLOOD PRESSURE: 80 MMHG | HEIGHT: 68 IN | BODY MASS INDEX: 35.16 KG/M2 | HEART RATE: 72 BPM | WEIGHT: 232 LBS | OXYGEN SATURATION: 90 % | SYSTOLIC BLOOD PRESSURE: 122 MMHG

## 2020-01-28 DIAGNOSIS — L84 FOOT CALLUS: ICD-10-CM

## 2020-01-28 DIAGNOSIS — M79.671 FOOT PAIN, BILATERAL: ICD-10-CM

## 2020-01-28 DIAGNOSIS — M79.672 FOOT PAIN, BILATERAL: ICD-10-CM

## 2020-01-28 DIAGNOSIS — I87.2 VENOUS INSUFFICIENCY (CHRONIC) (PERIPHERAL): ICD-10-CM

## 2020-01-28 DIAGNOSIS — B35.1 ONYCHOMYCOSIS: Primary | ICD-10-CM

## 2020-01-28 DIAGNOSIS — M20.11 HALLUX VALGUS, RIGHT: ICD-10-CM

## 2020-01-28 DIAGNOSIS — M20.12 HALLUX VALGUS, LEFT: ICD-10-CM

## 2020-01-28 DIAGNOSIS — R73.03 BORDERLINE DIABETIC: ICD-10-CM

## 2020-01-28 PROCEDURE — 11721 DEBRIDE NAIL 6 OR MORE: CPT | Performed by: PODIATRIST

## 2020-01-28 PROCEDURE — 11056 PARNG/CUTG B9 HYPRKR LES 2-4: CPT | Performed by: PODIATRIST

## 2020-01-28 PROCEDURE — 99212 OFFICE O/P EST SF 10 MIN: CPT | Performed by: PODIATRIST

## 2020-02-03 ENCOUNTER — OFFICE VISIT (OUTPATIENT)
Dept: WOUND CARE | Facility: HOSPITAL | Age: 75
End: 2020-02-03

## 2020-02-10 ENCOUNTER — OFFICE VISIT (OUTPATIENT)
Dept: WOUND CARE | Facility: HOSPITAL | Age: 75
End: 2020-02-10

## 2020-02-17 ENCOUNTER — OFFICE VISIT (OUTPATIENT)
Dept: WOUND CARE | Facility: HOSPITAL | Age: 75
End: 2020-02-17

## 2020-02-24 ENCOUNTER — OFFICE VISIT (OUTPATIENT)
Dept: WOUND CARE | Facility: HOSPITAL | Age: 75
End: 2020-02-24

## 2020-03-02 ENCOUNTER — OFFICE VISIT (OUTPATIENT)
Dept: WOUND CARE | Facility: HOSPITAL | Age: 75
End: 2020-03-02

## 2020-03-09 ENCOUNTER — OFFICE VISIT (OUTPATIENT)
Dept: WOUND CARE | Facility: HOSPITAL | Age: 75
End: 2020-03-09

## 2020-03-16 ENCOUNTER — OFFICE VISIT (OUTPATIENT)
Dept: WOUND CARE | Facility: HOSPITAL | Age: 75
End: 2020-03-16

## 2020-03-23 ENCOUNTER — OFFICE VISIT (OUTPATIENT)
Dept: WOUND CARE | Facility: HOSPITAL | Age: 75
End: 2020-03-23

## 2020-05-04 ENCOUNTER — OFFICE VISIT (OUTPATIENT)
Dept: WOUND CARE | Facility: HOSPITAL | Age: 75
End: 2020-05-04

## 2020-05-04 PROCEDURE — G0463 HOSPITAL OUTPT CLINIC VISIT: HCPCS

## 2020-05-11 ENCOUNTER — OFFICE VISIT (OUTPATIENT)
Dept: WOUND CARE | Facility: HOSPITAL | Age: 75
End: 2020-05-11

## 2020-05-11 ENCOUNTER — TELEPHONE (OUTPATIENT)
Dept: PODIATRY | Facility: CLINIC | Age: 75
End: 2020-05-11

## 2020-05-11 PROCEDURE — G0463 HOSPITAL OUTPT CLINIC VISIT: HCPCS

## 2020-05-12 ENCOUNTER — OFFICE VISIT (OUTPATIENT)
Dept: PODIATRY | Facility: CLINIC | Age: 75
End: 2020-05-12

## 2020-05-12 VITALS
HEIGHT: 68 IN | DIASTOLIC BLOOD PRESSURE: 86 MMHG | BODY MASS INDEX: 35.92 KG/M2 | WEIGHT: 237 LBS | HEART RATE: 76 BPM | SYSTOLIC BLOOD PRESSURE: 134 MMHG | OXYGEN SATURATION: 88 %

## 2020-05-12 DIAGNOSIS — M20.11 HALLUX VALGUS, RIGHT: ICD-10-CM

## 2020-05-12 DIAGNOSIS — B35.1 ONYCHOMYCOSIS: Primary | ICD-10-CM

## 2020-05-12 DIAGNOSIS — I87.2 VENOUS INSUFFICIENCY (CHRONIC) (PERIPHERAL): ICD-10-CM

## 2020-05-12 DIAGNOSIS — R73.03 BORDERLINE DIABETIC: ICD-10-CM

## 2020-05-12 DIAGNOSIS — L84 FOOT CALLUS: ICD-10-CM

## 2020-05-12 DIAGNOSIS — M20.12 HALLUX VALGUS, LEFT: ICD-10-CM

## 2020-05-12 PROCEDURE — 11056 PARNG/CUTG B9 HYPRKR LES 2-4: CPT | Performed by: PODIATRIST

## 2020-05-12 PROCEDURE — 11721 DEBRIDE NAIL 6 OR MORE: CPT | Performed by: PODIATRIST

## 2020-05-12 NOTE — PROGRESS NOTES
[facility] - PODIATRY    Today's Date: 05/12/20    Patient Name: Alissa Dominguez  MRN: [id number]  CSN: [id number]  PCP: Roland Cavazos MD  Referring Provider: No ref. provider found    SUBJECTIVE     Chief Complaint   Patient presents with   • Follow-up     3 month f/u of diabetic foot care.  Pt was released from  yesterday.  Pt is unaware of her blood sugar or A1C numbers.  Last saw PCP, Dr. Cavazos, on 3/12/20.       HPI: Alissa Dominguez, a 74 y.o.female, comes to clinic as a(n) established patient complaining of painful toenails. Patient has h/o COPD, DJD, HTN, GERD, hypothyroid, depression, Obesity, Sleep apnea, OA, hypercholesterolemia, borderline DM, Venous insufficiency. Patient is NIDDM and unsure of last BG level.  Denies numbness in feet. States that all her previous venous ulcerations are healed. Followed with vascular surgery and uses lymphedema pump. Has compression stockings but does not wear them daily. Notes that her toenails are very long, thick, discolored and crumbly. She is unable to care for them herself. Notes 2 calluses to her left foot. Admits pain at 3/10 level and described as aching and dull. Relates previous treatment(s) including foot care by podiatrist. Denies any constitutional symptoms. No other pedal complaints at this time.    Past Medical History:   Diagnosis Date   • Cellulitis of left lower limb    • Cellulitis of right lower limb    • COPD (chronic obstructive pulmonary disease) (CMS/HCC)    • DJD (degenerative joint disease), cervical     DJD C Spine/Spondylolysis, Cervical Region   • Essential (primary) hypertension    • GERD (gastroesophageal reflux disease)    • Hypothyroidism    • Major depressive disorder, recurrent, moderate (CMS/HCC)    • Morbid (severe) obesity due to excess calories (CMS/HCC)    • Obstructive sleep apnea    • Primary generalized (osteo)arthritis    • Pure hypercholesterolemia    • Tinea unguium    • Type 2 diabetes mellitus with  hyperglycemia (CMS/HCC)    • Venous insufficiency (chronic) (peripheral)      Past Surgical History:   Procedure Laterality Date   • CATARACT EXTRACTION, BILATERAL     • ESSURE TUBAL LIGATION     • HYSTERECTOMY     • VARICOSE VEIN SURGERY Right      Family History   Problem Relation Age of Onset   • Hypertension Sister      Social History     Socioeconomic History   • Marital status:      Spouse name: Not on file   • Number of children: Not on file   • Years of education: Not on file   • Highest education level: Not on file   Tobacco Use   • Smoking status: Current Every Day Smoker     Packs/day: 1.00     Types: Electronic Cigarette   • Smokeless tobacco: Never Used   Substance and Sexual Activity   • Alcohol use: No     Frequency: Never   • Drug use: No   • Sexual activity: Defer     Allergies   Allergen Reactions   • Augmentin [Amoxicillin-Pot Clavulanate] Other (See Comments)     Not Specified   • Ceclor [Cefaclor] Other (See Comments)     Not Specified     • Codeine Other (See Comments)     Not Specified   • Darvon [Propoxyphene] Other (See Comments)     Not Specified   • Fulvicin P-G [Griseofulvin] Other (See Comments)     Not Specified   • Niacin Other (See Comments)     Not Specified   • Valium [Diazepam] Other (See Comments)     Not Specified     Current Outpatient Medications   Medication Sig Dispense Refill   • Calcium Carb-Cholecalciferol (CALCIUM 600+D3) 600-200 MG-UNIT tablet Take  by mouth.     • coenzyme Q10 100 MG capsule Take 100 mg by mouth Daily.     • docusate sodium (COLACE) 100 MG capsule Take 100 mg by mouth 2 (Two) Times a Day.     • Fexofenadine HCl (MUCINEX ALLERGY PO) Take 1,200 mg by mouth Daily.     • Garlic 100 MG tablet Take  by mouth.     • Glucosamine-Chondroitin--400-200 MG tablet Take  by mouth.     • ibuprofen (ADVIL,MOTRIN) 200 MG tablet Take 200 mg by mouth Every 6 (Six) Hours As Needed for Mild Pain .     • levothyroxine (SYNTHROID, LEVOTHROID) 50 MCG tablet Take  50 mcg by mouth Daily.     • O2 (OXYGEN) Inhale 2 L/min 1 (One) Time.     • oxyCODONE-acetaminophen (PERCOCET)  MG per tablet Take 1 tablet by mouth Every 6 (Six) Hours As Needed for Moderate Pain .     • triamterene-hydrochlorothiazide (MAXZIDE) 75-50 MG per tablet Take 1 tablet by mouth Daily.     • verapamil (CALAN) 80 MG tablet Take 80 mg by mouth 2 (Two) Times a Day.     • Cholecalciferol (VITAMIN D-3) 1000 units capsule Take 1,000 Units by mouth Daily.       No current facility-administered medications for this visit.      Review of Systems   Constitutional: Negative for chills and fever.   HENT: Negative for congestion.    Respiratory: Negative for shortness of breath.    Cardiovascular: Positive for leg swelling. Negative for chest pain.   Gastrointestinal: Negative for constipation, diarrhea, nausea and vomiting.   Musculoskeletal: Positive for arthralgias and gait problem.        Foot pain   Skin: Positive for wound.   Neurological: Negative for numbness.       OBJECTIVE     Vitals:    20 1306   BP: 134/86   Pulse: 76   SpO2: (!) 88%       PHYSICAL EXAM  GEN:   Accompanied by none.     Foot/Ankle Exam:       General:   Orientation: AAOx3    Affect: appropriate    Gait: steppage    Assistance: cane    Shoe Gear:  Casual shoes    VASCULAR      Right Foot Vascularity   Dorsalis pedis:  2+  Posterior tibial:  2+  Skin Temperature: warm    Edema Gradin+ and pitting  CFT:  3  Varicosities: moderate varicosities       Left Foot Vascularity   Dorsalis pedis:  2+  Posterior tibial:  2+  Skin Temperature: warm    Edema Gradin+ and pitting  CFT:  3  Varicosities: moderate varicosities        NEUROLOGIC     Right Foot Neurologic   Normal sensation    Light touch sensation:  Normal  Vibratory sensation:  Normal  Hot/Cold sensation: normal    Protective Sensation using [place]-Bertin Monofilament:  10     Left Foot Neurologic   Normal sensation    Light touch sensation:  Normal  Vibratory  sensation:  Normal  Hot/cold sensation: normal    Protective Sensation using [place]-Bertin Monofilament:  10     MUSCULOSKELETAL      Right Foot Musculoskeletal   Ecchymosis:  None  Tenderness: toenails    Arch:  Pes planus  Hallux valgus: Yes    Hallux limitus: No       Left Foot Musculoskeletal   Ecchymosis:  None  Tenderness: toenails    Arch:  Pes planus  Hallux valgus: Yes    Hallux limitus: No       MUSCLE STRENGTH     Right Foot Muscle Strength   Foot dorsiflexion:  4+  Foot plantar flexion:  4+  Foot inversion:  4+  Foot eversion:  4+     Left Foot Muscle Strength   Foot dorsiflexion:  4+  Foot plantar flexion:  4+  Foot inversion:  4+  Foot eversion:  4+     RANGE OF MOTION      Right Foot Range of Motion   Foot and ankle ROM within normal limits       Left Foot Range of Motion   Foot and ankle ROM within normal limits       DERMATOLOGIC     Right Foot Dermatologic   Skin: skin intact    Skin: no right foot ulcer    Nails: onychomycosis, abnormally thick, subungual debris and dystrophic nails       Left Foot Dermatologic   Skin: skin intact    Skin: no left foot ulcer    Nails: onychomycosis, abnormally thick, subungual debris and dystrophic nails       Image:        RADIOLOGY/NUCLEAR:  No results found.    LABORATORY/CULTURE RESULTS:      PATHOLOGY RESULTS:       ASSESSMENT/PLAN     Alissa was seen today for follow-up.    Diagnoses and all orders for this visit:    Onychomycosis    Venous insufficiency (chronic) (peripheral)    Borderline diabetic    Hallux valgus, right    Hallux valgus, left      Comprehensive lower extremity examination and evaluation was performed.  Discussed findings and treatment plan including risks, benefits, and treatment options with patient in detail. Patient agreed with treatment plan.  After verbal consent obtained, nail(s) x10 debrided of length and thickness with nail nipper without incidence  After verbal consent obtained, calluses x2 pared utilizing dermal curette and/or  scalpel without incidence  Patient may maintain nails and calluses at home utilizing emery board or pumice stone between visits as needed   Continue with vascular surgery  Wear compression stockings daily to prevent leg ulcerations.  An After Visit Summary was printed and given to the patient at discharge, including (if requested) any available informative/educational handouts regarding diagnosis, treatment, or medications. All questions were answered to patient/family satisfaction. Should symptoms fail to improve or worsen they agree to call or return to clinic or to go to the Emergency Department. Discussed the importance of following up with any needed screening tests/labs/specialist appointments and any requested follow-up recommended by me today. Importance of maintaining follow-up discussed and patient accepts that missed appointments can delay diagnosis and potentially lead to worsening of conditions.  Return in about 3 months (around 8/12/2020)., or sooner if acute issues arise.    Lab Frequency Next Occurrence       This document has been electronically signed by Jaspreet Ragland DPM on May 12, 2020 13:35

## 2020-06-18 ENCOUNTER — LAB (OUTPATIENT)
Dept: LAB | Facility: HOSPITAL | Age: 75
End: 2020-06-18

## 2020-06-18 ENCOUNTER — TRANSCRIBE ORDERS (OUTPATIENT)
Dept: ADMINISTRATIVE | Facility: HOSPITAL | Age: 75
End: 2020-06-18

## 2020-06-18 DIAGNOSIS — R53.83 OTHER FATIGUE: Primary | ICD-10-CM

## 2020-06-18 DIAGNOSIS — J44.9 CHRONIC OBSTRUCTIVE PULMONARY DISEASE, UNSPECIFIED COPD TYPE (HCC): ICD-10-CM

## 2020-06-18 DIAGNOSIS — F33.1 MAJOR DEPRESSIVE DISORDER, RECURRENT EPISODE, MODERATE (HCC): ICD-10-CM

## 2020-06-18 LAB
ALBUMIN SERPL-MCNC: 4.1 G/DL (ref 3.5–5)
ALBUMIN/GLOB SERPL: 1.2 G/DL (ref 1.1–2.5)
ALP SERPL-CCNC: 56 U/L (ref 24–120)
ALT SERPL W P-5'-P-CCNC: 16 U/L (ref 0–35)
ANION GAP SERPL CALCULATED.3IONS-SCNC: 11 MMOL/L (ref 4–13)
AST SERPL-CCNC: 26 U/L (ref 7–45)
AUTO MIXED CELLS #: 0.5 10*3/MM3 (ref 0.1–2.6)
AUTO MIXED CELLS %: 6.2 % (ref 0.1–24)
BACTERIA UR QL AUTO: ABNORMAL /HPF
BILIRUB SERPL-MCNC: 0.6 MG/DL (ref 0.1–1)
BILIRUB UR QL STRIP: NEGATIVE
BUN BLD-MCNC: 26 MG/DL (ref 5–21)
BUN/CREAT SERPL: 22.8
CALCIUM SPEC-SCNC: 10 MG/DL (ref 8.4–10.4)
CHLORIDE SERPL-SCNC: 94 MMOL/L (ref 98–110)
CLARITY UR: CLEAR
CO2 SERPL-SCNC: 32 MMOL/L (ref 24–31)
COLOR UR: YELLOW
CREAT BLD-MCNC: 1.14 MG/DL (ref 0.5–1.4)
ERYTHROCYTE [DISTWIDTH] IN BLOOD BY AUTOMATED COUNT: 17.5 % (ref 12.3–15.4)
GFR SERPL CREATININE-BSD FRML MDRD: 46 ML/MIN/1.73
GLOBULIN UR ELPH-MCNC: 3.5 GM/DL
GLUCOSE BLD-MCNC: 119 MG/DL (ref 70–100)
GLUCOSE UR STRIP-MCNC: NEGATIVE MG/DL
HCT VFR BLD AUTO: 50.5 % (ref 34–46.6)
HGB BLD-MCNC: 15.6 G/DL (ref 12–15.9)
HGB UR QL STRIP.AUTO: ABNORMAL
HYALINE CASTS UR QL AUTO: ABNORMAL /LPF
KETONES UR QL STRIP: NEGATIVE
LEUKOCYTE ESTERASE UR QL STRIP.AUTO: ABNORMAL
LYMPHOCYTES # BLD AUTO: 1.6 10*3/MM3 (ref 0.7–3.1)
LYMPHOCYTES NFR BLD AUTO: 18.1 % (ref 19.6–45.3)
MCH RBC QN AUTO: 23.5 PG (ref 26.6–33)
MCHC RBC AUTO-ENTMCNC: 30.9 G/DL (ref 31.5–35.7)
MCV RBC AUTO: 75.9 FL (ref 79–97)
MUCOUS THREADS URNS QL MICRO: ABNORMAL /HPF
NEUTROPHILS # BLD AUTO: 6.7 10*3/MM3 (ref 1.7–7)
NEUTROPHILS NFR BLD AUTO: 75.7 % (ref 42.7–76)
NITRITE UR QL STRIP: NEGATIVE
PH UR STRIP.AUTO: 6 [PH] (ref 5–8)
PLATELET # BLD AUTO: 283 10*3/MM3 (ref 140–450)
PMV BLD AUTO: 8.8 FL (ref 6–12)
POTASSIUM BLD-SCNC: 4.2 MMOL/L (ref 3.5–5.3)
PROT SERPL-MCNC: 7.6 G/DL (ref 6.3–8.7)
PROT UR QL STRIP: ABNORMAL
RBC # BLD AUTO: 6.65 10*6/MM3 (ref 3.77–5.28)
RBC # UR: ABNORMAL /HPF
REF LAB TEST METHOD: ABNORMAL
SODIUM BLD-SCNC: 137 MMOL/L (ref 135–145)
SP GR UR STRIP: 1.02 (ref 1–1.03)
SQUAMOUS #/AREA URNS HPF: ABNORMAL /HPF
UROBILINOGEN UR QL STRIP: ABNORMAL
WBC NRBC COR # BLD: 8.8 10*3/MM3 (ref 3.4–10.8)
WBC UR QL AUTO: ABNORMAL /HPF

## 2020-06-18 PROCEDURE — 82607 VITAMIN B-12: CPT | Performed by: NURSE PRACTITIONER

## 2020-06-18 PROCEDURE — 84436 ASSAY OF TOTAL THYROXINE: CPT | Performed by: NURSE PRACTITIONER

## 2020-06-18 PROCEDURE — 85025 COMPLETE CBC W/AUTO DIFF WBC: CPT | Performed by: NURSE PRACTITIONER

## 2020-06-18 PROCEDURE — 80053 COMPREHEN METABOLIC PANEL: CPT | Performed by: NURSE PRACTITIONER

## 2020-06-18 PROCEDURE — 81001 URINALYSIS AUTO W/SCOPE: CPT | Performed by: NURSE PRACTITIONER

## 2020-06-18 PROCEDURE — 84443 ASSAY THYROID STIM HORMONE: CPT | Performed by: NURSE PRACTITIONER

## 2020-06-18 PROCEDURE — 87086 URINE CULTURE/COLONY COUNT: CPT | Performed by: NURSE PRACTITIONER

## 2020-06-18 PROCEDURE — 36415 COLL VENOUS BLD VENIPUNCTURE: CPT | Performed by: NURSE PRACTITIONER

## 2020-06-19 LAB
BACTERIA SPEC AEROBE CULT: NORMAL
T4 SERPL-MCNC: 7.36 MCG/DL (ref 4.5–11.7)
TSH SERPL DL<=0.05 MIU/L-ACNC: 3.86 UIU/ML (ref 0.27–4.2)
VIT B12 BLD-MCNC: 1120 PG/ML (ref 211–946)

## 2020-07-23 ENCOUNTER — TELEPHONE (OUTPATIENT)
Dept: PODIATRY | Facility: CLINIC | Age: 75
End: 2020-07-23

## 2020-08-11 NOTE — PROGRESS NOTES
"    [facility] - PODIATRY    Today's Date: 08/27/20    Patient Name: Alissa Dominguez  MRN: [id number]  CSN: [id number]  PCP: Roland Cavazos MD  Referring Provider: No ref. provider found    SUBJECTIVE     Chief Complaint   Patient presents with   • Follow-up     pt c/o long, thickened toenails-  callus on left foot- left 2nd toe wound - pt is not seeing WC at this -neuropathy- \"bottom of feet hurt\" - pain scale 7/10    • Diabetes     last A1C ~ 6.0 - PCP Dr. Cavazos last visit 5/13/20     HPI: Alissa Dominguez, a 74 y.o.female, comes to clinic as a(n) established patient presenting for diabetic foot exam, complaining of painful toenails and complaining of lower extremity wounds. Patient has h/o COPD, DJD, HTN, GERD, hypothyroid, depression, Obesity, Sleep apnea, OA, hypercholesterolemia, borderline DM, Venous insufficiency. Patient is NIDDM and unsure of last BG level. States that last A1C was approximately 6.  Denies numbness in feet. Relates that around 3 weeks ago she had reopening of lower extremity wounds as she states is a result of sitting in a jeep for 3 hours waiting for a key to be made for her vehicle. Was previously following with wound care for venous stasis ulcerations. Notes that she and her daughter have been caring for wounds at home with silvercel and ace wraps. Has followed with vascular surgery and uses lymphedema pump. Has compression stockings but does not wear them daily. Notes that her toenails are very long, thick, discolored and crumbly. She is unable to care for them herself. Notes 3 calluses to her left foot. Admits pain at 7/10 level and described as aching and dull. Relates previous treatment(s) including foot care by podiatrist. Denies any constitutional symptoms. No other pedal complaints at this time.    Past Medical History:   Diagnosis Date   • Cellulitis of left lower limb    • Cellulitis of right lower limb    • COPD (chronic obstructive pulmonary disease) " (CMS/[facility])    • DJD (degenerative joint disease), cervical     DJD C Spine/Spondylolysis, Cervical Region   • Essential (primary) hypertension    • GERD (gastroesophageal reflux disease)    • Hypothyroidism    • Major depressive disorder, recurrent, moderate (CMS/[facility])    • Morbid (severe) obesity due to excess calories (CMS/[facility])    • Obstructive sleep apnea    • Primary generalized (osteo)arthritis    • Pure hypercholesterolemia    • Tinea unguium    • Type 2 diabetes mellitus with hyperglycemia (CMS/[facility])    • Venous insufficiency (chronic) (peripheral)      Past Surgical History:   Procedure Laterality Date   • CATARACT EXTRACTION, BILATERAL     • ESSURE TUBAL LIGATION     • HYSTERECTOMY     • VARICOSE VEIN SURGERY Right      Family History   Problem Relation Age of Onset   • Hypertension Sister      Social History     Socioeconomic History   • Marital status:      Spouse name: Not on file   • Number of children: Not on file   • Years of education: Not on file   • Highest education level: Not on file   Tobacco Use   • Smoking status: Current Every Day Smoker     Packs/day: 1.00     Types: Electronic Cigarette   • Smokeless tobacco: Never Used   Substance and Sexual Activity   • Alcohol use: No     Frequency: Never   • Drug use: No   • Sexual activity: Defer     Allergies   Allergen Reactions   • Augmentin [Amoxicillin-Pot Clavulanate] Other (See Comments)     Not Specified   • Ceclor [Cefaclor] Other (See Comments)     Not Specified     • Codeine Other (See Comments)     Not Specified   • Darvon [Propoxyphene] Other (See Comments)     Not Specified   • Fulvicin P-G [Griseofulvin] Other (See Comments)     Not Specified   • Niacin Other (See Comments)     Not Specified   • Valium [Diazepam] Other (See Comments)     Not Specified     Current Outpatient Medications   Medication Sig Dispense Refill   • Calcium Carb-Cholecalciferol (CALCIUM 600+D3) 600-200 MG-UNIT tablet Take  by mouth.     • Cholecalciferol  (VITAMIN D-3) 1000 units capsule Take 1,000 Units by mouth Daily.     • coenzyme Q10 100 MG capsule Take 100 mg by mouth Daily.     • docusate sodium (COLACE) 100 MG capsule Take 100 mg by mouth 2 (Two) Times a Day.     • Fexofenadine HCl (MUCINEX ALLERGY PO) Take 1,200 mg by mouth Daily.     • Garlic 100 MG tablet Take  by mouth.     • Glucosamine-Chondroitin--400-200 MG tablet Take  by mouth.     • ibuprofen (ADVIL,MOTRIN) 200 MG tablet Take 200 mg by mouth Every 6 (Six) Hours As Needed for Mild Pain .     • levothyroxine (SYNTHROID, LEVOTHROID) 50 MCG tablet Take 50 mcg by mouth Daily.     • O2 (OXYGEN) Inhale 2 L/min 1 (One) Time.     • oxyCODONE-acetaminophen (PERCOCET)  MG per tablet Take 1 tablet by mouth Every 6 (Six) Hours As Needed for Moderate Pain .     • triamterene-hydrochlorothiazide (MAXZIDE) 75-50 MG per tablet Take 1 tablet by mouth Daily.     • verapamil (CALAN) 80 MG tablet Take 80 mg by mouth 2 (Two) Times a Day.       No current facility-administered medications for this visit.      Review of Systems   Constitutional: Negative for chills and fever.   HENT: Negative for congestion.    Respiratory: Negative for shortness of breath.    Cardiovascular: Positive for leg swelling. Negative for chest pain.   Gastrointestinal: Negative for constipation, diarrhea, nausea and vomiting.   Musculoskeletal: Positive for arthralgias and gait problem.        Foot pain   Skin: Positive for wound.   Neurological: Negative for numbness.       OBJECTIVE     Vitals:    08/27/20 1424   BP: 122/80   Pulse: 96   SpO2: (!) 86%       PHYSICAL EXAM  GEN:   Accompanied by none.     Foot/Ankle Exam:       General:   Diabetic Foot Exam Performed    Appearance: disheveled and obesity    Orientation: AAOx3    Affect: appropriate    Gait: steppage    Assistance: walker    Shoe Gear:  Casual shoes    VASCULAR      Right Foot Vascularity   Dorsalis pedis:  2+  Posterior tibial:  2+  Skin Temperature: warm    Edema  Gradin+ and pitting  CFT:  3  Varicosities: moderate varicosities       Left Foot Vascularity   Dorsalis pedis:  2+  Posterior tibial:  2+  Skin Temperature: warm    Edema Gradin+ and pitting  CFT:  3  Varicosities: moderate varicosities        NEUROLOGIC     Right Foot Neurologic   Normal sensation    Light touch sensation:  Normal  Vibratory sensation:  Normal  Hot/Cold sensation: normal    Protective Sensation using [place]-Bert[place] Monofilament:  10     Left Foot Neurologic   Normal sensation    Light touch sensation:  Normal  Vibratory sensation:  Normal  Hot/cold sensation: normal    Protective Sensation using [place]-Bertin Monofilament:  10     MUSCULOSKELETAL      Right Foot Musculoskeletal   Ecchymosis:  None  Tenderness: toenails    Arch:  Pes planus  Hammertoe:  Second toe, third toe, fourth toe and fifth toe  Hallux valgus: Yes (2nd toe overriding hallux)    Hallux limitus: No       Left Foot Musculoskeletal   Ecchymosis:  None  Tenderness: toenails    Arch:  Pes planus  Hammertoe:  Second toe, third toe, fifth toe and fourth toe  Hallux valgus: Yes    Hallux limitus: No       MUSCLE STRENGTH     Right Foot Muscle Strength   Foot dorsiflexion:  4+  Foot plantar flexion:  4+  Foot inversion:  4+  Foot eversion:  4+     Left Foot Muscle Strength   Foot dorsiflexion:  4+  Foot plantar flexion:  4+  Foot inversion:  4+  Foot eversion:  4+     RANGE OF MOTION      Right Foot Range of Motion   Foot and ankle ROM within normal limits       Left Foot Range of Motion   Foot and ankle ROM within normal limits       DERMATOLOGIC     Right Foot Dermatologic   Skin: ulcer    Nails: onychomycosis, abnormally thick, subungual debris and dystrophic nails       Left Foot Dermatologic   Skin: ulcer    Nails: onychomycosis, abnormally thick, subungual debris and dystrophic nails       Image:        RADIOLOGY/NUCLEAR:  No results found.    LABORATORY/CULTURE RESULTS:      PATHOLOGY RESULTS:          ASSESSMENT/PLAN     Alissa was seen today for follow-up and diabetes.    Diagnoses and all orders for this visit:    Onychomycosis    Foot callus    Venous stasis ulcer of ankle with fat layer exposed with varicose veins, unspecified laterality (CMS/HCC)    Diabetic ulcer of toe of left foot associated with diabetes mellitus due to underlying condition, with fat layer exposed (CMS/HCC)    Venous insufficiency (chronic) (peripheral)    Borderline diabetic    Hallux valgus, right    Hallux valgus, left    Foot pain, bilateral    Hammer toes of both feet      Comprehensive lower extremity examination and evaluation was performed.  Discussed findings and treatment plan including risks, benefits, and treatment options with patient in detail. Patient agreed with treatment plan.  After verbal consent obtained, nail(s) x10 debrided of length and thickness with nail nipper without incidence  After verbal consent obtained, calluses x3 pared utilizing dermal curette and/or scalpel without incidence  Patient may maintain nails and calluses at home utilizing emery board or pumice stone between visits as needed  After verbal consent obtained, excisional debridement performed to remove skin, slough, non-viable, and subQ tissue down to level of healthy bleeding tissue with dermal curette and/or sharp instrumentation. Hemostasis achieved with compression and silver nitrate. Ulceration debrided in entirety to documented measurements.   Applied abx ointment and light bandage. Pt to reapply daily.  Continue with vascular surgery follow-ups. Continue compression to BLE.   Refer to [facility] for further treatment of venous stasis and diabetic ulcerations .  An After Visit Summary was printed and given to the patient at discharge, including (if requested) any available informative/educational handouts regarding diagnosis, treatment, or medications. All questions were answered to patient/family satisfaction. Should symptoms fail to  improve or worsen they agree to call or return to clinic or to go to the Emergency Department. Discussed the importance of following up with any needed screening tests/labs/specialist appointments and any requested follow-up recommended by me today. Importance of maintaining follow-up discussed and patient accepts that missed appointments can delay diagnosis and potentially lead to worsening of conditions.  Return in about 3 months (around 11/27/2020)., or sooner if acute issues arise.    Lab Frequency Next Occurrence       This document has been electronically signed by Jaspreet Ragland DPM on August 27, 2020 14:51

## 2020-08-27 ENCOUNTER — OFFICE VISIT (OUTPATIENT)
Dept: PODIATRY | Facility: CLINIC | Age: 75
End: 2020-08-27

## 2020-08-27 VITALS
SYSTOLIC BLOOD PRESSURE: 122 MMHG | HEART RATE: 96 BPM | OXYGEN SATURATION: 86 % | WEIGHT: 239 LBS | HEIGHT: 68 IN | BODY MASS INDEX: 36.22 KG/M2 | DIASTOLIC BLOOD PRESSURE: 80 MMHG

## 2020-08-27 DIAGNOSIS — E08.621 DIABETIC ULCER OF TOE OF LEFT FOOT ASSOCIATED WITH DIABETES MELLITUS DUE TO UNDERLYING CONDITION, WITH FAT LAYER EXPOSED (HCC): ICD-10-CM

## 2020-08-27 DIAGNOSIS — M20.42 HAMMER TOES OF BOTH FEET: ICD-10-CM

## 2020-08-27 DIAGNOSIS — M79.672 FOOT PAIN, BILATERAL: ICD-10-CM

## 2020-08-27 DIAGNOSIS — L84 FOOT CALLUS: ICD-10-CM

## 2020-08-27 DIAGNOSIS — M79.671 FOOT PAIN, BILATERAL: ICD-10-CM

## 2020-08-27 DIAGNOSIS — B35.1 ONYCHOMYCOSIS: Primary | ICD-10-CM

## 2020-08-27 DIAGNOSIS — L97.302 VENOUS STASIS ULCER OF ANKLE WITH FAT LAYER EXPOSED WITH VARICOSE VEINS, UNSPECIFIED LATERALITY (HCC): ICD-10-CM

## 2020-08-27 DIAGNOSIS — L97.522 DIABETIC ULCER OF TOE OF LEFT FOOT ASSOCIATED WITH DIABETES MELLITUS DUE TO UNDERLYING CONDITION, WITH FAT LAYER EXPOSED (HCC): ICD-10-CM

## 2020-08-27 DIAGNOSIS — R73.03 BORDERLINE DIABETIC: ICD-10-CM

## 2020-08-27 DIAGNOSIS — I87.2 VENOUS INSUFFICIENCY (CHRONIC) (PERIPHERAL): ICD-10-CM

## 2020-08-27 DIAGNOSIS — I83.003 VENOUS STASIS ULCER OF ANKLE WITH FAT LAYER EXPOSED WITH VARICOSE VEINS, UNSPECIFIED LATERALITY (HCC): ICD-10-CM

## 2020-08-27 DIAGNOSIS — M20.12 HALLUX VALGUS, LEFT: ICD-10-CM

## 2020-08-27 DIAGNOSIS — M20.41 HAMMER TOES OF BOTH FEET: ICD-10-CM

## 2020-08-27 DIAGNOSIS — M20.11 HALLUX VALGUS, RIGHT: ICD-10-CM

## 2020-08-27 PROCEDURE — 11721 DEBRIDE NAIL 6 OR MORE: CPT | Performed by: PODIATRIST

## 2020-08-27 PROCEDURE — 11042 DBRDMT SUBQ TIS 1ST 20SQCM/<: CPT | Performed by: PODIATRIST

## 2020-08-27 PROCEDURE — 11056 PARNG/CUTG B9 HYPRKR LES 2-4: CPT | Performed by: PODIATRIST

## 2020-08-27 PROCEDURE — 99213 OFFICE O/P EST LOW 20 MIN: CPT | Performed by: PODIATRIST

## 2020-09-02 ENCOUNTER — OFFICE VISIT (OUTPATIENT)
Dept: WOUND CARE | Facility: HOSPITAL | Age: 75
End: 2020-09-02

## 2020-09-02 ENCOUNTER — APPOINTMENT (OUTPATIENT)
Dept: WOUND CARE | Facility: HOSPITAL | Age: 75
End: 2020-09-02

## 2020-09-02 PROCEDURE — 97597 DBRDMT OPN WND 1ST 20 CM/<: CPT | Performed by: NURSE PRACTITIONER

## 2020-09-02 PROCEDURE — G0463 HOSPITAL OUTPT CLINIC VISIT: HCPCS

## 2020-09-02 PROCEDURE — 99212 OFFICE O/P EST SF 10 MIN: CPT | Performed by: NURSE PRACTITIONER

## 2020-09-09 ENCOUNTER — OFFICE VISIT (OUTPATIENT)
Dept: WOUND CARE | Facility: HOSPITAL | Age: 75
End: 2020-09-09

## 2020-09-09 PROCEDURE — 97597 DBRDMT OPN WND 1ST 20 CM/<: CPT | Performed by: NURSE PRACTITIONER

## 2020-09-09 PROCEDURE — 29580 STRAPPING UNNA BOOT: CPT | Performed by: NURSE PRACTITIONER

## 2020-09-14 ENCOUNTER — OFFICE VISIT (OUTPATIENT)
Dept: WOUND CARE | Facility: HOSPITAL | Age: 75
End: 2020-09-14

## 2020-09-14 PROCEDURE — 97597 DBRDMT OPN WND 1ST 20 CM/<: CPT | Performed by: PODIATRIST

## 2020-09-21 ENCOUNTER — OFFICE VISIT (OUTPATIENT)
Dept: WOUND CARE | Facility: HOSPITAL | Age: 75
End: 2020-09-21

## 2020-09-21 PROCEDURE — 11042 DBRDMT SUBQ TIS 1ST 20SQCM/<: CPT | Performed by: PODIATRIST

## 2020-09-28 ENCOUNTER — OFFICE VISIT (OUTPATIENT)
Dept: WOUND CARE | Facility: HOSPITAL | Age: 75
End: 2020-09-28

## 2020-09-28 PROCEDURE — 97597 DBRDMT OPN WND 1ST 20 CM/<: CPT | Performed by: NURSE PRACTITIONER

## 2020-10-05 ENCOUNTER — OFFICE VISIT (OUTPATIENT)
Dept: WOUND CARE | Facility: HOSPITAL | Age: 75
End: 2020-10-05

## 2020-10-05 PROCEDURE — 97597 DBRDMT OPN WND 1ST 20 CM/<: CPT | Performed by: PODIATRIST

## 2020-10-05 PROCEDURE — 11042 DBRDMT SUBQ TIS 1ST 20SQCM/<: CPT | Performed by: PODIATRIST

## 2020-10-13 ENCOUNTER — OFFICE VISIT (OUTPATIENT)
Dept: WOUND CARE | Facility: HOSPITAL | Age: 75
End: 2020-10-13

## 2020-10-13 PROCEDURE — 97597 DBRDMT OPN WND 1ST 20 CM/<: CPT | Performed by: NURSE PRACTITIONER

## 2020-10-20 ENCOUNTER — OFFICE VISIT (OUTPATIENT)
Dept: WOUND CARE | Facility: HOSPITAL | Age: 75
End: 2020-10-20

## 2020-10-20 PROCEDURE — 97597 DBRDMT OPN WND 1ST 20 CM/<: CPT | Performed by: NURSE PRACTITIONER

## 2020-10-27 ENCOUNTER — OFFICE VISIT (OUTPATIENT)
Dept: WOUND CARE | Facility: HOSPITAL | Age: 75
End: 2020-10-27

## 2020-10-27 PROCEDURE — 11055 PARING/CUTG B9 HYPRKER LES 1: CPT | Performed by: NURSE PRACTITIONER

## 2020-10-27 PROCEDURE — 97597 DBRDMT OPN WND 1ST 20 CM/<: CPT | Performed by: NURSE PRACTITIONER

## 2020-10-27 PROCEDURE — 11055 PARING/CUTG B9 HYPRKER LES 1: CPT

## 2020-11-03 ENCOUNTER — OFFICE VISIT (OUTPATIENT)
Dept: WOUND CARE | Facility: HOSPITAL | Age: 75
End: 2020-11-03

## 2020-11-03 PROCEDURE — 99213 OFFICE O/P EST LOW 20 MIN: CPT | Performed by: NURSE PRACTITIONER

## 2020-11-03 PROCEDURE — G0463 HOSPITAL OUTPT CLINIC VISIT: HCPCS

## 2020-11-10 ENCOUNTER — OFFICE VISIT (OUTPATIENT)
Dept: WOUND CARE | Facility: HOSPITAL | Age: 75
End: 2020-11-10

## 2020-11-10 PROCEDURE — 97597 DBRDMT OPN WND 1ST 20 CM/<: CPT | Performed by: NURSE PRACTITIONER

## 2020-11-17 ENCOUNTER — OFFICE VISIT (OUTPATIENT)
Dept: WOUND CARE | Facility: HOSPITAL | Age: 75
End: 2020-11-17

## 2020-11-17 PROCEDURE — 11055 PARING/CUTG B9 HYPRKER LES 1: CPT

## 2020-11-17 PROCEDURE — 97597 DBRDMT OPN WND 1ST 20 CM/<: CPT | Performed by: NURSE PRACTITIONER

## 2020-11-23 ENCOUNTER — OFFICE VISIT (OUTPATIENT)
Dept: WOUND CARE | Facility: HOSPITAL | Age: 75
End: 2020-11-23

## 2020-11-23 PROCEDURE — 97597 DBRDMT OPN WND 1ST 20 CM/<: CPT | Performed by: PODIATRIST

## 2020-11-30 ENCOUNTER — TELEPHONE (OUTPATIENT)
Dept: PODIATRY | Facility: CLINIC | Age: 75
End: 2020-11-30

## 2020-12-01 ENCOUNTER — OFFICE VISIT (OUTPATIENT)
Dept: WOUND CARE | Facility: HOSPITAL | Age: 75
End: 2020-12-01

## 2020-12-01 PROCEDURE — G0463 HOSPITAL OUTPT CLINIC VISIT: HCPCS

## 2020-12-01 PROCEDURE — 99213 OFFICE O/P EST LOW 20 MIN: CPT | Performed by: NURSE PRACTITIONER

## 2020-12-09 ENCOUNTER — APPOINTMENT (OUTPATIENT)
Dept: WOUND CARE | Facility: HOSPITAL | Age: 75
End: 2020-12-09

## 2020-12-10 NOTE — PROGRESS NOTES
"    [facility] - PODIATRY    Today's Date: 12/17/20    Patient Name: Alissa Dominguez  MRN: [id number]  CSN: [id number]  PCP: Roland Cavazos MD  Referring Provider: No ref. provider found    SUBJECTIVE     Chief Complaint   Patient presents with   • Follow-up     pt is here for 3mo f/u on foot/nail care - pt c/o long, thickened toenails-  callus on left foot - pt denies any pain at this moment  - pcp [facility]    • Diabetes     last A1C 6.9     HPI: Alissa Dominguez, a 75 y.o.female, comes to clinic as a(n) established patient presenting for diabetic foot exam, complaining of painful toenails and complaining of edema with drainage of left leg. Patient has h/o COPD, DJD, HTN, GERD, hypothyroid, depression, Obesity, Sleep apnea, OA, hypercholesterolemia, borderline DM, Venous insufficiency. Patient is NIDDM and unsure of last BG level. States that last A1C was 6.9%.  Denies numbness in feet. She notes that her left leg opens up and drains \"every 3 days\" but states that any wound closes spontaneously. Has followed with vascular surgery and uses lymphedema pump. Has compression stockings but does not wear them daily. Notes that her toenails are very long, thick, discolored and crumbly. She is unable to care for them herself. Notes return of calluses to her left foot. Denies pain at the present time. Relates previous treatment(s) including foot care by podiatrist. Denies any constitutional symptoms. No other pedal complaints at this time.    Past Medical History:   Diagnosis Date   • Cellulitis of left lower limb    • Cellulitis of right lower limb    • COPD (chronic obstructive pulmonary disease) (CMS/HCC)    • DJD (degenerative joint disease), cervical     DJD C Spine/Spondylolysis, Cervical Region   • Essential (primary) hypertension    • GERD (gastroesophageal reflux disease)    • Hypothyroidism    • Major depressive disorder, recurrent, moderate (CMS/HCC)    • Morbid (severe) obesity due to excess " calories (CMS/[facility])    • Obstructive sleep apnea    • Primary generalized (osteo)arthritis    • Pure hypercholesterolemia    • Tinea unguium    • Type 2 diabetes mellitus with hyperglycemia (CMS/[facility])    • Venous insufficiency (chronic) (peripheral)      Past Surgical History:   Procedure Laterality Date   • CATARACT EXTRACTION, BILATERAL     • ESSURE TUBAL LIGATION     • HYSTERECTOMY     • VARICOSE VEIN SURGERY Right      Family History   Problem Relation Age of Onset   • Hypertension Sister      Social History     Socioeconomic History   • Marital status:      Spouse name: Not on file   • Number of children: Not on file   • Years of education: Not on file   • Highest education level: Not on file   Tobacco Use   • Smoking status: Current Every Day Smoker     Packs/day: 1.00     Types: Electronic Cigarette   • Smokeless tobacco: Never Used   Substance and Sexual Activity   • Alcohol use: No     Frequency: Never   • Drug use: No   • Sexual activity: Defer     Allergies   Allergen Reactions   • Augmentin [Amoxicillin-Pot Clavulanate] Other (See Comments)     Not Specified   • Ceclor [Cefaclor] Other (See Comments)     Not Specified     • Codeine Other (See Comments)     Not Specified   • Darvon [Propoxyphene] Other (See Comments)     Not Specified   • Fulvicin P-G [Griseofulvin] Other (See Comments)     Not Specified   • Niacin Other (See Comments)     Not Specified   • Valium [Diazepam] Other (See Comments)     Not Specified     Current Outpatient Medications   Medication Sig Dispense Refill   • Calcium Carb-Cholecalciferol (CALCIUM 600+D3) 600-200 MG-UNIT tablet Take  by mouth.     • Cholecalciferol (VITAMIN D-3) 1000 units capsule Take 1,000 Units by mouth Daily.     • coenzyme Q10 100 MG capsule Take 100 mg by mouth Daily.     • docusate sodium (COLACE) 100 MG capsule Take 100 mg by mouth 2 (Two) Times a Day.     • Fexofenadine HCl (MUCINEX ALLERGY PO) Take 1,200 mg by mouth Daily.     • Garlic 100 MG tablet  Take  by mouth.     • Glucosamine-Chondroitin--400-200 MG tablet Take  by mouth.     • ibuprofen (ADVIL,MOTRIN) 200 MG tablet Take 200 mg by mouth Every 6 (Six) Hours As Needed for Mild Pain .     • levothyroxine (SYNTHROID, LEVOTHROID) 50 MCG tablet Take 50 mcg by mouth Daily.     • O2 (OXYGEN) Inhale 2 L/min 1 (One) Time.     • oxyCODONE-acetaminophen (PERCOCET)  MG per tablet Take 1 tablet by mouth Every 6 (Six) Hours As Needed for Moderate Pain .     • triamterene-hydrochlorothiazide (MAXZIDE) 75-50 MG per tablet Take 1 tablet by mouth Daily.     • verapamil (CALAN) 80 MG tablet Take 80 mg by mouth 2 (Two) Times a Day.       No current facility-administered medications for this visit.      Review of Systems   Constitutional: Negative for chills and fever.   HENT: Negative for congestion.    Respiratory: Negative for shortness of breath.    Cardiovascular: Positive for leg swelling. Negative for chest pain.   Gastrointestinal: Negative for constipation, diarrhea, nausea and vomiting.   Musculoskeletal: Positive for arthralgias and gait problem.        Foot pain   Skin: Negative for wound.   Neurological: Negative for numbness.       OBJECTIVE     Vitals:    20 1345   BP: 142/89   Pulse: 103   SpO2: 93%       PHYSICAL EXAM  GEN:   Accompanied by none.     Foot/Ankle Exam:       General:   Diabetic Foot Exam Performed    Appearance: disheveled and obesity    Orientation: AAOx3    Affect: appropriate    Gait: steppage    Assistance: walker    Shoe Gear:  Casual shoes    VASCULAR      Right Foot Vascularity   Dorsalis pedis:  2+  Posterior tibial:  2+  Skin Temperature: warm    Edema Gradin+ and pitting  CFT:  3  Varicosities: moderate varicosities       Left Foot Vascularity   Dorsalis pedis:  2+  Posterior tibial:  2+  Skin Temperature: warm    Edema Gradin+ and pitting  CFT:  3  Varicosities: moderate varicosities        NEUROLOGIC     Right Foot Neurologic   Normal sensation    Light  touch sensation:  Normal  Vibratory sensation:  Normal  Hot/Cold sensation: normal    Protective Sensation using [place]-Bertin Monofilament:  10     Left Foot Neurologic   Normal sensation    Light touch sensation:  Normal  Vibratory sensation:  Normal  Hot/cold sensation: normal    Protective Sensation using [place]-Bertin Monofilament:  10     MUSCULOSKELETAL      Right Foot Musculoskeletal   Ecchymosis:  None  Tenderness: toenails    Arch:  Pes planus  Hammertoe:  Second toe, third toe, fourth toe and fifth toe  Hallux valgus: Yes (2nd toe overriding hallux)    Hallux limitus: No       Left Foot Musculoskeletal   Ecchymosis:  None  Tenderness: toenails    Arch:  Pes planus  Hammertoe:  Second toe, third toe, fifth toe and fourth toe  Hallux valgus: Yes    Hallux limitus: No       MUSCLE STRENGTH     Right Foot Muscle Strength   Foot dorsiflexion:  4+  Foot plantar flexion:  4+  Foot inversion:  4+  Foot eversion:  4+     Left Foot Muscle Strength   Foot dorsiflexion:  4+  Foot plantar flexion:  4+  Foot inversion:  4+  Foot eversion:  4+     RANGE OF MOTION      Right Foot Range of Motion   Foot and ankle ROM within normal limits       Left Foot Range of Motion   Foot and ankle ROM within normal limits       DERMATOLOGIC     Right Foot Dermatologic   Skin: atrophic    Skin: no right foot ulcer    Nails: onychomycosis, abnormally thick, subungual debris and dystrophic nails       Left Foot Dermatologic   Skin: atrophic    Skin: no left foot ulcer    Nails: onychomycosis, abnormally thick, subungual debris and dystrophic nails       Image:        RADIOLOGY/NUCLEAR:  No results found.    LABORATORY/CULTURE RESULTS:      PATHOLOGY RESULTS:       ASSESSMENT/PLAN     Diagnoses and all orders for this visit:    1. Onychomycosis (Primary)    2. Foot callus    3. Borderline diabetic    4. Venous insufficiency (chronic) (peripheral)    5. Hallux valgus, right    6. Hallux valgus, left    7. Hammer toes of both  feet      Comprehensive lower extremity examination and evaluation was performed.  Discussed findings and treatment plan including risks, benefits, and treatment options with patient in detail. Patient agreed with treatment plan.  After verbal consent obtained, nail(s) x10 debrided of length and thickness with nail nipper without incidence  After verbal consent obtained, calluses x4 pared utilizing dermal curette and/or scalpel without incidence  Patient may maintain nails and calluses at home utilizing emery board or pumice stone between visits as needed.    Continue with vascular surgery follow-ups. Continue compression to BLE and use of lymphedema pumps.   An After Visit Summary was printed and given to the patient at discharge, including (if requested) any available informative/educational handouts regarding diagnosis, treatment, or medications. All questions were answered to patient/family satisfaction. Should symptoms fail to improve or worsen they agree to call or return to clinic or to go to the Emergency Department. Discussed the importance of following up with any needed screening tests/labs/specialist appointments and any requested follow-up recommended by me today. Importance of maintaining follow-up discussed and patient accepts that missed appointments can delay diagnosis and potentially lead to worsening of conditions.  Return in about 3 months (around 3/17/2021)., or sooner if acute issues arise.    Lab Frequency Next Occurrence       This document has been electronically signed by Jaspreet Ragland DPM on December 17, 2020 14:17 CST

## 2020-12-11 ENCOUNTER — OFFICE VISIT (OUTPATIENT)
Dept: WOUND CARE | Facility: HOSPITAL | Age: 75
End: 2020-12-11

## 2020-12-11 PROCEDURE — G0463 HOSPITAL OUTPT CLINIC VISIT: HCPCS

## 2020-12-11 PROCEDURE — 99213 OFFICE O/P EST LOW 20 MIN: CPT | Performed by: PODIATRIST

## 2020-12-17 ENCOUNTER — OFFICE VISIT (OUTPATIENT)
Dept: PODIATRY | Facility: CLINIC | Age: 75
End: 2020-12-17

## 2020-12-17 VITALS
BODY MASS INDEX: 36.37 KG/M2 | DIASTOLIC BLOOD PRESSURE: 89 MMHG | HEIGHT: 68 IN | HEART RATE: 103 BPM | OXYGEN SATURATION: 93 % | WEIGHT: 240 LBS | SYSTOLIC BLOOD PRESSURE: 142 MMHG

## 2020-12-17 DIAGNOSIS — M79.672 FOOT PAIN, BILATERAL: ICD-10-CM

## 2020-12-17 DIAGNOSIS — M20.41 HAMMER TOES OF BOTH FEET: ICD-10-CM

## 2020-12-17 DIAGNOSIS — B35.1 ONYCHOMYCOSIS: Primary | ICD-10-CM

## 2020-12-17 DIAGNOSIS — M20.11 HALLUX VALGUS, RIGHT: ICD-10-CM

## 2020-12-17 DIAGNOSIS — M20.42 HAMMER TOES OF BOTH FEET: ICD-10-CM

## 2020-12-17 DIAGNOSIS — I87.2 VENOUS INSUFFICIENCY (CHRONIC) (PERIPHERAL): ICD-10-CM

## 2020-12-17 DIAGNOSIS — M79.671 FOOT PAIN, BILATERAL: ICD-10-CM

## 2020-12-17 DIAGNOSIS — R73.03 BORDERLINE DIABETIC: ICD-10-CM

## 2020-12-17 DIAGNOSIS — L84 FOOT CALLUS: ICD-10-CM

## 2020-12-17 DIAGNOSIS — M20.12 HALLUX VALGUS, LEFT: ICD-10-CM

## 2020-12-17 PROCEDURE — 11056 PARNG/CUTG B9 HYPRKR LES 2-4: CPT | Performed by: PODIATRIST

## 2020-12-17 PROCEDURE — 11721 DEBRIDE NAIL 6 OR MORE: CPT | Performed by: PODIATRIST

## 2020-12-17 NOTE — PATIENT INSTRUCTIONS
Diabetes Mellitus and Foot Care  Foot care is an important part of your health, especially when you have diabetes. Diabetes may cause you to have problems because of poor blood flow (circulation) to your feet and legs, which can cause your skin to:  · Become thinner and drier.  · Break more easily.  · Heal more slowly.  · Peel and crack.  You may also have nerve damage (neuropathy) in your legs and feet, causing decreased feeling in them. This means that you may not notice minor injuries to your feet that could lead to more serious problems. Noticing and addressing any potential problems early is the best way to prevent future foot problems.  How to care for your feet  Foot hygiene  · Wash your feet daily with warm water and mild soap. Do not use hot water. Then, pat your feet and the areas between your toes until they are completely dry. Do not soak your feet as this can dry your skin.  · Trim your toenails straight across. Do not dig under them or around the cuticle. File the edges of your nails with an emery board or nail file.  · Apply a moisturizing lotion or petroleum jelly to the skin on your feet and to dry, brittle toenails. Use lotion that does not contain alcohol and is unscented. Do not apply lotion between your toes.  Shoes and socks  · Wear clean socks or stockings every day. Make sure they are not too tight. Do not wear knee-high stockings since they may decrease blood flow to your legs.  · Wear shoes that fit properly and have enough cushioning. Always look in your shoes before you put them on to be sure there are no objects inside.  · To break in new shoes, wear them for just a few hours a day. This prevents injuries on your feet.  Wounds, scrapes, corns, and calluses  · Check your feet daily for blisters, cuts, bruises, sores, and redness. If you cannot see the bottom of your feet, use a mirror or ask someone for help.  · Do not cut corns or calluses or try to remove them with medicine.  · If you  find a minor scrape, cut, or break in the skin on your feet, keep it and the skin around it clean and dry. You may clean these areas with mild soap and water. Do not clean the area with peroxide, alcohol, or iodine.  · If you have a wound, scrape, corn, or callus on your foot, look at it several times a day to make sure it is healing and not infected. Check for:  ? Redness, swelling, or pain.  ? Fluid or blood.  ? Warmth.  ? Pus or a bad smell.  General instructions  · Do not cross your legs. This may decrease blood flow to your feet.  · Do not use heating pads or hot water bottles on your feet. They may burn your skin. If you have lost feeling in your feet or legs, you may not know this is happening until it is too late.  · Protect your feet from hot and cold by wearing shoes, such as at the beach or on hot pavement.  · Schedule a complete foot exam at least once a year (annually) or more often if you have foot problems. If you have foot problems, report any cuts, sores, or bruises to your health care provider immediately.  Contact a health care provider if:  · You have a medical condition that increases your risk of infection and you have any cuts, sores, or bruises on your feet.  · You have an injury that is not healing.  · You have redness on your legs or feet.  · You feel burning or tingling in your legs or feet.  · You have pain or cramps in your legs and feet.  · Your legs or feet are numb.  · Your feet always feel cold.  · You have pain around a toenail.  Get help right away if:  · You have a wound, scrape, corn, or callus on your foot and:  ? You have pain, swelling, or redness that gets worse.  ? You have fluid or blood coming from the wound, scrape, corn, or callus.  ? Your wound, scrape, corn, or callus feels warm to the touch.  ? You have pus or a bad smell coming from the wound, scrape, corn, or callus.  ? You have a fever.  ? You have a red line going up your leg.  Summary  · Check your feet every day  for cuts, sores, red spots, swelling, and blisters.  · Moisturize feet and legs daily.  · Wear shoes that fit properly and have enough cushioning.  · If you have foot problems, report any cuts, sores, or bruises to your health care provider immediately.  · Schedule a complete foot exam at least once a year (annually) or more often if you have foot problems.  This information is not intended to replace advice given to you by your health care provider. Make sure you discuss any questions you have with your health care provider.  Document Revised: 09/09/2020 Document Reviewed: 01/19/2018  Elsevier Patient Education © 2020 [employer]

## 2021-01-01 ENCOUNTER — ANESTHESIA EVENT (OUTPATIENT)
Dept: PERIOP | Facility: HOSPITAL | Age: 76
End: 2021-01-01

## 2021-01-01 ENCOUNTER — TELEPHONE (OUTPATIENT)
Dept: PODIATRY | Facility: CLINIC | Age: 76
End: 2021-01-01

## 2021-01-01 ENCOUNTER — LAB (OUTPATIENT)
Dept: LAB | Facility: HOSPITAL | Age: 76
End: 2021-01-01

## 2021-01-01 ENCOUNTER — HOSPITAL ENCOUNTER (OUTPATIENT)
Dept: ULTRASOUND IMAGING | Facility: HOSPITAL | Age: 76
Discharge: HOME | End: 2021-10-27

## 2021-01-01 ENCOUNTER — HOSPITAL ENCOUNTER (OUTPATIENT)
Dept: CARDIOLOGY | Facility: HOSPITAL | Age: 76
Discharge: HOME | End: 2021-11-18
Admitting: EMERGENCY MEDICINE

## 2021-01-01 ENCOUNTER — APPOINTMENT (OUTPATIENT)
Dept: PREADMISSION TESTING | Facility: HOSPITAL | Age: 76
End: 2021-01-01

## 2021-01-01 ENCOUNTER — HOSPITAL ENCOUNTER (OUTPATIENT)
Facility: HOSPITAL | Age: 76
Discharge: NURSING, REHAB OR LONG-TERM CARE | End: 2021-12-04
Attending: STUDENT IN AN ORGANIZED HEALTH CARE EDUCATION/TRAINING PROGRAM | Admitting: STUDENT IN AN ORGANIZED HEALTH CARE EDUCATION/TRAINING PROGRAM

## 2021-01-01 ENCOUNTER — HOSPITAL ENCOUNTER (OUTPATIENT)
Dept: GENERAL RADIOLOGY | Facility: HOSPITAL | Age: 76
Discharge: HOME | End: 2021-11-10

## 2021-01-01 ENCOUNTER — OFFICE VISIT (OUTPATIENT)
Dept: CARDIOLOGY | Facility: CLINIC | Age: 76
End: 2021-01-01

## 2021-01-01 ENCOUNTER — OFFICE VISIT (OUTPATIENT)
Dept: PODIATRY | Facility: CLINIC | Age: 76
End: 2021-01-01

## 2021-01-01 ENCOUNTER — ANESTHESIA (OUTPATIENT)
Dept: PERIOP | Facility: HOSPITAL | Age: 76
End: 2021-01-01

## 2021-01-01 ENCOUNTER — TRANSCRIBE ORDERS (OUTPATIENT)
Dept: ADMINISTRATIVE | Facility: HOSPITAL | Age: 76
End: 2021-01-01

## 2021-01-01 ENCOUNTER — TRANSCRIBE ORDERS (OUTPATIENT)
Dept: LAB | Facility: HOSPITAL | Age: 76
End: 2021-01-01

## 2021-01-01 ENCOUNTER — HOSPITAL ENCOUNTER (OUTPATIENT)
Facility: HOSPITAL | Age: 76
Setting detail: HOSPITAL OUTPATIENT SURGERY
Discharge: HOME | End: 2021-12-21
Attending: STUDENT IN AN ORGANIZED HEALTH CARE EDUCATION/TRAINING PROGRAM | Admitting: STUDENT IN AN ORGANIZED HEALTH CARE EDUCATION/TRAINING PROGRAM

## 2021-01-01 ENCOUNTER — PRE-ADMISSION TESTING (OUTPATIENT)
Dept: PREADMISSION TESTING | Facility: HOSPITAL | Age: 76
End: 2021-01-01

## 2021-01-01 ENCOUNTER — HOSPITAL ENCOUNTER (OUTPATIENT)
Dept: MAMMOGRAPHY | Facility: HOSPITAL | Age: 76
Discharge: HOME | End: 2021-10-27

## 2021-01-01 ENCOUNTER — APPOINTMENT (OUTPATIENT)
Dept: LAB | Facility: HOSPITAL | Age: 76
End: 2021-01-01

## 2021-01-01 VITALS
DIASTOLIC BLOOD PRESSURE: 73 MMHG | SYSTOLIC BLOOD PRESSURE: 126 MMHG | HEART RATE: 90 BPM | HEIGHT: 67 IN | BODY MASS INDEX: 33.15 KG/M2 | RESPIRATION RATE: 25 BRPM | OXYGEN SATURATION: 90 % | WEIGHT: 211.2 LBS

## 2021-01-01 VITALS
DIASTOLIC BLOOD PRESSURE: 73 MMHG | BODY MASS INDEX: 33.72 KG/M2 | HEART RATE: 69 BPM | SYSTOLIC BLOOD PRESSURE: 99 MMHG | HEIGHT: 64 IN | WEIGHT: 197.53 LBS | OXYGEN SATURATION: 92 % | RESPIRATION RATE: 12 BRPM | TEMPERATURE: 97.7 F

## 2021-01-01 VITALS
RESPIRATION RATE: 16 BRPM | SYSTOLIC BLOOD PRESSURE: 112 MMHG | DIASTOLIC BLOOD PRESSURE: 66 MMHG | BODY MASS INDEX: 33.15 KG/M2 | TEMPERATURE: 97.6 F | HEIGHT: 67 IN | WEIGHT: 211.2 LBS | HEART RATE: 63 BPM | OXYGEN SATURATION: 90 %

## 2021-01-01 VITALS
HEART RATE: 54 BPM | HEIGHT: 68 IN | SYSTOLIC BLOOD PRESSURE: 132 MMHG | OXYGEN SATURATION: 89 % | BODY MASS INDEX: 32.04 KG/M2 | DIASTOLIC BLOOD PRESSURE: 94 MMHG | WEIGHT: 211.4 LBS

## 2021-01-01 VITALS
WEIGHT: 211 LBS | HEART RATE: 83 BPM | DIASTOLIC BLOOD PRESSURE: 84 MMHG | HEIGHT: 67 IN | BODY MASS INDEX: 33.12 KG/M2 | SYSTOLIC BLOOD PRESSURE: 122 MMHG

## 2021-01-01 VITALS — HEIGHT: 68 IN | BODY MASS INDEX: 29.7 KG/M2 | WEIGHT: 196 LBS

## 2021-01-01 VITALS
SYSTOLIC BLOOD PRESSURE: 119 MMHG | DIASTOLIC BLOOD PRESSURE: 99 MMHG | HEART RATE: 92 BPM | WEIGHT: 210.98 LBS | BODY MASS INDEX: 33.11 KG/M2

## 2021-01-01 VITALS
HEART RATE: 90 BPM | OXYGEN SATURATION: 87 % | DIASTOLIC BLOOD PRESSURE: 78 MMHG | HEIGHT: 68 IN | SYSTOLIC BLOOD PRESSURE: 142 MMHG | WEIGHT: 196.4 LBS | BODY MASS INDEX: 29.77 KG/M2

## 2021-01-01 DIAGNOSIS — M79.671 FOOT PAIN, BILATERAL: ICD-10-CM

## 2021-01-01 DIAGNOSIS — Z01.818 PREOPERATIVE EVALUATION TO RULE OUT SURGICAL CONTRAINDICATION: ICD-10-CM

## 2021-01-01 DIAGNOSIS — Z11.59 SCREENING FOR VIRAL DISEASE: Primary | ICD-10-CM

## 2021-01-01 DIAGNOSIS — R94.31 ABNORMAL EKG: ICD-10-CM

## 2021-01-01 DIAGNOSIS — M20.42 HAMMER TOES OF BOTH FEET: ICD-10-CM

## 2021-01-01 DIAGNOSIS — B35.1 ONYCHOMYCOSIS: Primary | ICD-10-CM

## 2021-01-01 DIAGNOSIS — M20.11 HALLUX VALGUS, RIGHT: ICD-10-CM

## 2021-01-01 DIAGNOSIS — I87.2 VENOUS INSUFFICIENCY (CHRONIC) (PERIPHERAL): ICD-10-CM

## 2021-01-01 DIAGNOSIS — R59.9 SWELLING OF LYMPH NODES: ICD-10-CM

## 2021-01-01 DIAGNOSIS — M20.41 HAMMER TOES OF BOTH FEET: ICD-10-CM

## 2021-01-01 DIAGNOSIS — Z17.0 MALIGNANT NEOPLASM OF UPPER-OUTER QUADRANT OF BREAST IN FEMALE, ESTROGEN RECEPTOR POSITIVE, UNSPECIFIED LATERALITY (HCC): ICD-10-CM

## 2021-01-01 DIAGNOSIS — I10 ESSENTIAL (PRIMARY) HYPERTENSION: ICD-10-CM

## 2021-01-01 DIAGNOSIS — L84 FOOT CALLUS: ICD-10-CM

## 2021-01-01 DIAGNOSIS — Z74.09 IMPAIRED MOBILITY: ICD-10-CM

## 2021-01-01 DIAGNOSIS — M79.672 FOOT PAIN, BILATERAL: ICD-10-CM

## 2021-01-01 DIAGNOSIS — R06.02 SHORTNESS OF BREATH: ICD-10-CM

## 2021-01-01 DIAGNOSIS — E78.00 PURE HYPERCHOLESTEROLEMIA: ICD-10-CM

## 2021-01-01 DIAGNOSIS — M20.12 HALLUX VALGUS, LEFT: ICD-10-CM

## 2021-01-01 DIAGNOSIS — R73.03 BORDERLINE DIABETIC: ICD-10-CM

## 2021-01-01 DIAGNOSIS — N63.0 LUMP OR MASS IN BREAST: Primary | ICD-10-CM

## 2021-01-01 DIAGNOSIS — N63.0 LUMP OR MASS IN BREAST: ICD-10-CM

## 2021-01-01 DIAGNOSIS — E11.65 TYPE 2 DIABETES MELLITUS WITH HYPERGLYCEMIA, UNSPECIFIED WHETHER LONG TERM INSULIN USE (HCC): ICD-10-CM

## 2021-01-01 DIAGNOSIS — R06.02 SHORTNESS OF BREATH: Primary | ICD-10-CM

## 2021-01-01 DIAGNOSIS — Z17.0 MALIGNANT NEOPLASM OF UPPER-OUTER QUADRANT OF BREAST IN FEMALE, ESTROGEN RECEPTOR POSITIVE, UNSPECIFIED LATERALITY (HCC): Primary | ICD-10-CM

## 2021-01-01 DIAGNOSIS — Z01.818 PREOP TESTING: ICD-10-CM

## 2021-01-01 DIAGNOSIS — N63.0 BREAST MASS: ICD-10-CM

## 2021-01-01 DIAGNOSIS — Z78.9 DECREASED ACTIVITIES OF DAILY LIVING (ADL): ICD-10-CM

## 2021-01-01 DIAGNOSIS — C50.419 MALIGNANT NEOPLASM OF UPPER-OUTER QUADRANT OF BREAST IN FEMALE, ESTROGEN RECEPTOR POSITIVE, UNSPECIFIED LATERALITY (HCC): Primary | ICD-10-CM

## 2021-01-01 DIAGNOSIS — C50.419 MALIGNANT NEOPLASM OF UPPER-OUTER QUADRANT OF BREAST IN FEMALE, ESTROGEN RECEPTOR POSITIVE, UNSPECIFIED LATERALITY (HCC): ICD-10-CM

## 2021-01-01 LAB
ALBUMIN SERPL-MCNC: 3.8 G/DL (ref 3.5–5.2)
ALBUMIN/GLOB SERPL: 1.3 G/DL
ALP SERPL-CCNC: 66 U/L (ref 39–117)
ALT SERPL W P-5'-P-CCNC: 12 U/L (ref 1–33)
ANION GAP SERPL CALCULATED.3IONS-SCNC: 11 MMOL/L (ref 5–15)
ANION GAP SERPL CALCULATED.3IONS-SCNC: 7 MMOL/L (ref 5–15)
AST SERPL-CCNC: 27 U/L (ref 1–32)
BASOPHILS # BLD AUTO: 0.04 10*3/MM3 (ref 0–0.2)
BASOPHILS NFR BLD AUTO: 0.6 % (ref 0–1.5)
BH CV STRESS BP STAGE 1: NORMAL
BH CV STRESS BP STAGE 2: NORMAL
BH CV STRESS BP STAGE 3: NORMAL
BH CV STRESS DOSE DOBUTAMINE STAGE 1: 10
BH CV STRESS DOSE DOBUTAMINE STAGE 2: 20
BH CV STRESS DOSE DOBUTAMINE STAGE 3: 30
BH CV STRESS DURATION MIN STAGE 1: 3
BH CV STRESS DURATION MIN STAGE 2: 3
BH CV STRESS DURATION MIN STAGE 3: 0
BH CV STRESS DURATION SEC STAGE 1: 0
BH CV STRESS DURATION SEC STAGE 2: 0
BH CV STRESS DURATION SEC STAGE 3: 28
BH CV STRESS HR STAGE 1: 84
BH CV STRESS HR STAGE 2: 111
BH CV STRESS HR STAGE 3: 126
BH CV STRESS PROTOCOL 1: NORMAL
BH CV STRESS RECOVERY BP: NORMAL MMHG
BH CV STRESS RECOVERY HR: 93 BPM
BH CV STRESS STAGE 1: 1
BH CV STRESS STAGE 2: 2
BH CV STRESS STAGE 3: 3
BILIRUB SERPL-MCNC: 0.9 MG/DL (ref 0–1.2)
BUN SERPL-MCNC: 13 MG/DL (ref 8–23)
BUN SERPL-MCNC: 19 MG/DL (ref 8–23)
BUN/CREAT SERPL: 20.6 (ref 7–25)
BUN/CREAT SERPL: 27.9 (ref 7–25)
CALCIUM SPEC-SCNC: 9.2 MG/DL (ref 8.6–10.5)
CALCIUM SPEC-SCNC: 9.5 MG/DL (ref 8.6–10.5)
CHLORIDE SERPL-SCNC: 88 MMOL/L (ref 98–107)
CHLORIDE SERPL-SCNC: 91 MMOL/L (ref 98–107)
CO2 SERPL-SCNC: 29 MMOL/L (ref 22–29)
CO2 SERPL-SCNC: 30 MMOL/L (ref 22–29)
CREAT SERPL-MCNC: 0.63 MG/DL (ref 0.57–1)
CREAT SERPL-MCNC: 0.68 MG/DL (ref 0.57–1)
CYTO UR: NORMAL
CYTO UR: NORMAL
DEPRECATED RDW RBC AUTO: 44.1 FL (ref 37–54)
DEPRECATED RDW RBC AUTO: 44.9 FL (ref 37–54)
EOSINOPHIL # BLD AUTO: 0.13 10*3/MM3 (ref 0–0.4)
EOSINOPHIL NFR BLD AUTO: 1.9 % (ref 0.3–6.2)
ERYTHROCYTE [DISTWIDTH] IN BLOOD BY AUTOMATED COUNT: 16.4 % (ref 12.3–15.4)
ERYTHROCYTE [DISTWIDTH] IN BLOOD BY AUTOMATED COUNT: 16.6 % (ref 12.3–15.4)
GFR SERPL CREATININE-BSD FRML MDRD: 84 ML/MIN/1.73
GFR SERPL CREATININE-BSD FRML MDRD: 91 ML/MIN/1.73
GLOBULIN UR ELPH-MCNC: 2.9 GM/DL
GLUCOSE BLDC GLUCOMTR-MCNC: 113 MG/DL (ref 70–130)
GLUCOSE BLDC GLUCOMTR-MCNC: 118 MG/DL (ref 70–130)
GLUCOSE BLDC GLUCOMTR-MCNC: 118 MG/DL (ref 70–130)
GLUCOSE BLDC GLUCOMTR-MCNC: 140 MG/DL (ref 70–130)
GLUCOSE BLDC GLUCOMTR-MCNC: 143 MG/DL (ref 70–130)
GLUCOSE BLDC GLUCOMTR-MCNC: 153 MG/DL (ref 70–130)
GLUCOSE BLDC GLUCOMTR-MCNC: 159 MG/DL (ref 70–130)
GLUCOSE BLDC GLUCOMTR-MCNC: 92 MG/DL (ref 70–130)
GLUCOSE BLDC GLUCOMTR-MCNC: 98 MG/DL (ref 70–130)
GLUCOSE SERPL-MCNC: 103 MG/DL (ref 65–99)
GLUCOSE SERPL-MCNC: 165 MG/DL (ref 65–99)
HCT VFR BLD AUTO: 38 % (ref 34–46.6)
HCT VFR BLD AUTO: 47.1 % (ref 34–46.6)
HGB BLD-MCNC: 12.3 G/DL (ref 12–15.9)
HGB BLD-MCNC: 14.5 G/DL (ref 12–15.9)
IMM GRANULOCYTES # BLD AUTO: 0.03 10*3/MM3 (ref 0–0.05)
IMM GRANULOCYTES NFR BLD AUTO: 0.4 % (ref 0–0.5)
LAB AP CASE REPORT: NORMAL
LAB AP CASE REPORT: NORMAL
LAB AP SYNOPTIC CHECKLIST: NORMAL
LYMPHOCYTES # BLD AUTO: 1.16 10*3/MM3 (ref 0.7–3.1)
LYMPHOCYTES NFR BLD AUTO: 17.1 % (ref 19.6–45.3)
MAXIMAL PREDICTED HEART RATE: 143 BPM
MCH RBC QN AUTO: 24.2 PG (ref 26.6–33)
MCH RBC QN AUTO: 24.5 PG (ref 26.6–33)
MCHC RBC AUTO-ENTMCNC: 30.8 G/DL (ref 31.5–35.7)
MCHC RBC AUTO-ENTMCNC: 32.4 G/DL (ref 31.5–35.7)
MCV RBC AUTO: 75.7 FL (ref 79–97)
MCV RBC AUTO: 78.5 FL (ref 79–97)
MONOCYTES # BLD AUTO: 0.47 10*3/MM3 (ref 0.1–0.9)
MONOCYTES NFR BLD AUTO: 6.9 % (ref 5–12)
NEUTROPHILS NFR BLD AUTO: 4.94 10*3/MM3 (ref 1.7–7)
NEUTROPHILS NFR BLD AUTO: 73.1 % (ref 42.7–76)
NRBC BLD AUTO-RTO: 0 /100 WBC (ref 0–0.2)
PATH REPORT.ADDENDUM SPEC: NORMAL
PATH REPORT.FINAL DX SPEC: NORMAL
PATH REPORT.FINAL DX SPEC: NORMAL
PATH REPORT.GROSS SPEC: NORMAL
PATH REPORT.GROSS SPEC: NORMAL
PERCENT MAX PREDICTED HR: 88.11 %
PLATELET # BLD AUTO: 272 10*3/MM3 (ref 140–450)
PLATELET # BLD AUTO: 388 10*3/MM3 (ref 140–450)
PMV BLD AUTO: 10.3 FL (ref 6–12)
PMV BLD AUTO: 8.7 FL (ref 6–12)
POTASSIUM SERPL-SCNC: 4.1 MMOL/L (ref 3.5–5.2)
POTASSIUM SERPL-SCNC: 4.2 MMOL/L (ref 3.5–5.2)
PROT SERPL-MCNC: 6.7 G/DL (ref 6–8.5)
QT INTERVAL: 420 MS
QTC INTERVAL: 490 MS
RBC # BLD AUTO: 5.02 10*6/MM3 (ref 3.77–5.28)
RBC # BLD AUTO: 6 10*6/MM3 (ref 3.77–5.28)
SARS-COV-2 ORF1AB RESP QL NAA+PROBE: NOT DETECTED
SARS-COV-2 ORF1AB RESP QL NAA+PROBE: NOT DETECTED
SARS-COV-2 RNA PNL SPEC NAA+PROBE: NOT DETECTED
SARS-COV-2 RNA PNL SPEC NAA+PROBE: NOT DETECTED
SODIUM SERPL-SCNC: 124 MMOL/L (ref 136–145)
SODIUM SERPL-SCNC: 132 MMOL/L (ref 136–145)
STRESS BASELINE BP: NORMAL MMHG
STRESS BASELINE HR: 81 BPM
STRESS PERCENT HR: 104 %
STRESS POST EXERCISE DUR MIN: 6 MIN
STRESS POST EXERCISE DUR SEC: 28 SEC
STRESS POST PEAK BP: NORMAL MMHG
STRESS POST PEAK HR: 126 BPM
STRESS TARGET HR: 122 BPM
WBC # BLD AUTO: 6.77 10*3/MM3 (ref 3.4–10.8)
WBC NRBC COR # BLD: 7.66 10*3/MM3 (ref 3.4–10.8)

## 2021-01-01 PROCEDURE — 93018 CV STRESS TEST I&R ONLY: CPT | Performed by: EMERGENCY MEDICINE

## 2021-01-01 PROCEDURE — A9270 NON-COVERED ITEM OR SERVICE: HCPCS | Performed by: STUDENT IN AN ORGANIZED HEALTH CARE EDUCATION/TRAINING PROGRAM

## 2021-01-01 PROCEDURE — 25010000002 ENOXAPARIN PER 10 MG: Performed by: STUDENT IN AN ORGANIZED HEALTH CARE EDUCATION/TRAINING PROGRAM

## 2021-01-01 PROCEDURE — 93350 STRESS TTE ONLY: CPT | Performed by: EMERGENCY MEDICINE

## 2021-01-01 PROCEDURE — 11056 PARNG/CUTG B9 HYPRKR LES 2-4: CPT | Performed by: PODIATRIST

## 2021-01-01 PROCEDURE — 63710000001 LEVOTHYROXINE 75 MCG TABLET: Performed by: STUDENT IN AN ORGANIZED HEALTH CARE EDUCATION/TRAINING PROGRAM

## 2021-01-01 PROCEDURE — 0 METHYLENE BLUE 50 MG/10ML SOLUTION: Performed by: STUDENT IN AN ORGANIZED HEALTH CARE EDUCATION/TRAINING PROGRAM

## 2021-01-01 PROCEDURE — 99213 OFFICE O/P EST LOW 20 MIN: CPT | Performed by: PODIATRIST

## 2021-01-01 PROCEDURE — 88305 TISSUE EXAM BY PATHOLOGIST: CPT | Performed by: STUDENT IN AN ORGANIZED HEALTH CARE EDUCATION/TRAINING PROGRAM

## 2021-01-01 PROCEDURE — C9803 HOPD COVID-19 SPEC COLLECT: HCPCS | Performed by: STUDENT IN AN ORGANIZED HEALTH CARE EDUCATION/TRAINING PROGRAM

## 2021-01-01 PROCEDURE — 25010000002 DEXAMETHASONE PER 1 MG: Performed by: ANESTHESIOLOGY

## 2021-01-01 PROCEDURE — 63710000001 CEPHALEXIN 500 MG CAPSULE: Performed by: STUDENT IN AN ORGANIZED HEALTH CARE EDUCATION/TRAINING PROGRAM

## 2021-01-01 PROCEDURE — 11721 DEBRIDE NAIL 6 OR MORE: CPT | Performed by: PODIATRIST

## 2021-01-01 PROCEDURE — 63710000001 DOCUSATE SODIUM 100 MG CAPSULE: Performed by: STUDENT IN AN ORGANIZED HEALTH CARE EDUCATION/TRAINING PROGRAM

## 2021-01-01 PROCEDURE — 63710000001 ACETAMINOPHEN 500 MG TABLET: Performed by: STUDENT IN AN ORGANIZED HEALTH CARE EDUCATION/TRAINING PROGRAM

## 2021-01-01 PROCEDURE — C1889 IMPLANT/INSERT DEVICE, NOC: HCPCS | Performed by: STUDENT IN AN ORGANIZED HEALTH CARE EDUCATION/TRAINING PROGRAM

## 2021-01-01 PROCEDURE — 99204 OFFICE O/P NEW MOD 45 MIN: CPT | Performed by: EMERGENCY MEDICINE

## 2021-01-01 PROCEDURE — 25010000002 ONDANSETRON PER 1 MG: Performed by: ANESTHESIOLOGY

## 2021-01-01 PROCEDURE — 63710000001 CALCIUM CARB-CHOLECALCIFEROL 600-800 MG-UNIT TABLET: Performed by: STUDENT IN AN ORGANIZED HEALTH CARE EDUCATION/TRAINING PROGRAM

## 2021-01-01 PROCEDURE — 63710000001 OXYCODONE 5 MG TABLET: Performed by: STUDENT IN AN ORGANIZED HEALTH CARE EDUCATION/TRAINING PROGRAM

## 2021-01-01 PROCEDURE — 25010000002 FENTANYL CITRATE (PF) 50 MCG/ML SOLUTION: Performed by: ANESTHESIOLOGY

## 2021-01-01 PROCEDURE — A4648 IMPLANTABLE TISSUE MARKER: HCPCS

## 2021-01-01 PROCEDURE — 63710000001 TRIAMTERENE-HYDROCHLOROTHIAZIDE 75-50 MG TABLET: Performed by: STUDENT IN AN ORGANIZED HEALTH CARE EDUCATION/TRAINING PROGRAM

## 2021-01-01 PROCEDURE — 93350 STRESS TTE ONLY: CPT

## 2021-01-01 PROCEDURE — 25010000002 CEFAZOLIN PER 500 MG: Performed by: STUDENT IN AN ORGANIZED HEALTH CARE EDUCATION/TRAINING PROGRAM

## 2021-01-01 PROCEDURE — 88341 IMHCHEM/IMCYTCHM EA ADD ANTB: CPT | Performed by: STUDENT IN AN ORGANIZED HEALTH CARE EDUCATION/TRAINING PROGRAM

## 2021-01-01 PROCEDURE — 63710000001 VALSARTAN 80 MG TABLET: Performed by: STUDENT IN AN ORGANIZED HEALTH CARE EDUCATION/TRAINING PROGRAM

## 2021-01-01 PROCEDURE — 25010000002 PROPOFOL 10 MG/ML EMULSION

## 2021-01-01 PROCEDURE — 94799 UNLISTED PULMONARY SVC/PX: CPT

## 2021-01-01 PROCEDURE — U0004 COV-19 TEST NON-CDC HGH THRU: HCPCS | Performed by: STUDENT IN AN ORGANIZED HEALTH CARE EDUCATION/TRAINING PROGRAM

## 2021-01-01 PROCEDURE — G0378 HOSPITAL OBSERVATION PER HR: HCPCS

## 2021-01-01 PROCEDURE — 25010000002 PROPOFOL 10 MG/ML EMULSION: Performed by: NURSE ANESTHETIST, CERTIFIED REGISTERED

## 2021-01-01 PROCEDURE — 88361 TUMOR IMMUNOHISTOCHEM/COMPUT: CPT

## 2021-01-01 PROCEDURE — 76882 US LMTD JT/FCL EVL NVASC XTR: CPT

## 2021-01-01 PROCEDURE — 93005 ELECTROCARDIOGRAM TRACING: CPT

## 2021-01-01 PROCEDURE — 25010000002 FENTANYL CITRATE (PF) 100 MCG/2ML SOLUTION

## 2021-01-01 PROCEDURE — 88360 TUMOR IMMUNOHISTOCHEM/MANUAL: CPT | Performed by: STUDENT IN AN ORGANIZED HEALTH CARE EDUCATION/TRAINING PROGRAM

## 2021-01-01 PROCEDURE — 76642 ULTRASOUND BREAST LIMITED: CPT

## 2021-01-01 PROCEDURE — 80048 BASIC METABOLIC PNL TOTAL CA: CPT | Performed by: STUDENT IN AN ORGANIZED HEALTH CARE EDUCATION/TRAINING PROGRAM

## 2021-01-01 PROCEDURE — 82962 GLUCOSE BLOOD TEST: CPT

## 2021-01-01 PROCEDURE — 25010000002 PERFLUTREN 6.52 MG/ML SUSPENSION: Performed by: EMERGENCY MEDICINE

## 2021-01-01 PROCEDURE — 93010 ELECTROCARDIOGRAM REPORT: CPT | Performed by: INTERNAL MEDICINE

## 2021-01-01 PROCEDURE — 63710000001 TRAMADOL 50 MG TABLET: Performed by: STUDENT IN AN ORGANIZED HEALTH CARE EDUCATION/TRAINING PROGRAM

## 2021-01-01 PROCEDURE — 36415 COLL VENOUS BLD VENIPUNCTURE: CPT

## 2021-01-01 PROCEDURE — 71045 X-RAY EXAM CHEST 1 VIEW: CPT

## 2021-01-01 PROCEDURE — 25010000002 ONDANSETRON PER 1 MG: Performed by: STUDENT IN AN ORGANIZED HEALTH CARE EDUCATION/TRAINING PROGRAM

## 2021-01-01 PROCEDURE — 88342 IMHCHEM/IMCYTCHM 1ST ANTB: CPT | Performed by: STUDENT IN AN ORGANIZED HEALTH CARE EDUCATION/TRAINING PROGRAM

## 2021-01-01 PROCEDURE — 0 LIDOCAINE 1 % SOLUTION 20 ML VIAL: Performed by: STUDENT IN AN ORGANIZED HEALTH CARE EDUCATION/TRAINING PROGRAM

## 2021-01-01 PROCEDURE — 25010000002 HEPARIN (PORCINE) PER 1000 UNITS: Performed by: STUDENT IN AN ORGANIZED HEALTH CARE EDUCATION/TRAINING PROGRAM

## 2021-01-01 PROCEDURE — 85025 COMPLETE CBC W/AUTO DIFF WBC: CPT

## 2021-01-01 PROCEDURE — 88307 TISSUE EXAM BY PATHOLOGIST: CPT | Performed by: STUDENT IN AN ORGANIZED HEALTH CARE EDUCATION/TRAINING PROGRAM

## 2021-01-01 PROCEDURE — 97116 GAIT TRAINING THERAPY: CPT

## 2021-01-01 PROCEDURE — 11057 PARNG/CUTG B9 HYPRKR LES >4: CPT | Performed by: PODIATRIST

## 2021-01-01 PROCEDURE — 85027 COMPLETE CBC AUTOMATED: CPT | Performed by: STUDENT IN AN ORGANIZED HEALTH CARE EDUCATION/TRAINING PROGRAM

## 2021-01-01 PROCEDURE — 97165 OT EVAL LOW COMPLEX 30 MIN: CPT

## 2021-01-01 PROCEDURE — 87635 SARS-COV-2 COVID-19 AMP PRB: CPT | Performed by: STUDENT IN AN ORGANIZED HEALTH CARE EDUCATION/TRAINING PROGRAM

## 2021-01-01 PROCEDURE — 93352 ADMIN ECG CONTRAST AGENT: CPT | Performed by: EMERGENCY MEDICINE

## 2021-01-01 PROCEDURE — 97161 PT EVAL LOW COMPLEX 20 MIN: CPT | Performed by: PHYSICAL THERAPIST

## 2021-01-01 PROCEDURE — 25010000002 ONDANSETRON PER 1 MG: Performed by: NURSE ANESTHETIST, CERTIFIED REGISTERED

## 2021-01-01 PROCEDURE — 0 DOBUTAMINE PER 250 MG: Performed by: EMERGENCY MEDICINE

## 2021-01-01 PROCEDURE — 25010000002 FENTANYL CITRATE (PF) 100 MCG/2ML SOLUTION: Performed by: NURSE ANESTHETIST, CERTIFIED REGISTERED

## 2021-01-01 PROCEDURE — 80053 COMPREHEN METABOLIC PANEL: CPT

## 2021-01-01 PROCEDURE — 93017 CV STRESS TEST TRACING ONLY: CPT

## 2021-01-01 DEVICE — MARKR TISS/BRST MAGTRACE NO/RADITON INJ/LIQ 2ML: Type: IMPLANTABLE DEVICE | Site: BREAST | Status: FUNCTIONAL

## 2021-01-01 DEVICE — LIGACLIP MCA MULTIPLE CLIP APPLIERS, 30 MEDIUM CLIPS
Type: IMPLANTABLE DEVICE | Site: BREAST | Status: FUNCTIONAL
Brand: LIGACLIP

## 2021-01-01 DEVICE — LIGACLIP MCA MULTIPLE CLIP APPLIERS, 20 MEDIUM CLIPS
Type: IMPLANTABLE DEVICE | Site: BREAST | Status: FUNCTIONAL
Brand: LIGACLIP

## 2021-01-01 DEVICE — SEAL HEMO SURG ARISTA/AH ABS/PWDR 3GM: Type: IMPLANTABLE DEVICE | Site: BREAST | Status: FUNCTIONAL

## 2021-01-01 RX ORDER — TRAMADOL HYDROCHLORIDE 50 MG/1
50 TABLET ORAL EVERY 6 HOURS PRN
Status: DISCONTINUED | OUTPATIENT
Start: 2021-01-01 | End: 2021-01-01 | Stop reason: HOSPADM

## 2021-01-01 RX ORDER — VALSARTAN 80 MG/1
80 TABLET ORAL 2 TIMES DAILY
Status: DISCONTINUED | OUTPATIENT
Start: 2021-01-01 | End: 2021-01-01 | Stop reason: HOSPADM

## 2021-01-01 RX ORDER — SODIUM CHLORIDE 0.9 % (FLUSH) 0.9 %
3-10 SYRINGE (ML) INJECTION AS NEEDED
Status: DISCONTINUED | OUTPATIENT
Start: 2021-01-01 | End: 2021-01-01 | Stop reason: HOSPADM

## 2021-01-01 RX ORDER — LIDOCAINE HYDROCHLORIDE 10 MG/ML
0.5 INJECTION, SOLUTION EPIDURAL; INFILTRATION; INTRACAUDAL; PERINEURAL ONCE AS NEEDED
Status: DISCONTINUED | OUTPATIENT
Start: 2021-01-01 | End: 2021-01-01 | Stop reason: HOSPADM

## 2021-01-01 RX ORDER — TRIAMTERENE AND HYDROCHLOROTHIAZIDE 75; 50 MG/1; MG/1
1 TABLET ORAL DAILY
Status: DISCONTINUED | OUTPATIENT
Start: 2021-01-01 | End: 2021-01-01 | Stop reason: HOSPADM

## 2021-01-01 RX ORDER — NALOXONE HCL 0.4 MG/ML
0.4 VIAL (ML) INJECTION AS NEEDED
Status: DISCONTINUED | OUTPATIENT
Start: 2021-01-01 | End: 2021-01-01 | Stop reason: HOSPADM

## 2021-01-01 RX ORDER — CALCIUM CARBONATE 200(500)MG
2 TABLET,CHEWABLE ORAL 3 TIMES DAILY PRN
Status: DISCONTINUED | OUTPATIENT
Start: 2021-01-01 | End: 2021-01-01 | Stop reason: HOSPADM

## 2021-01-01 RX ORDER — SODIUM CHLORIDE, SODIUM LACTATE, POTASSIUM CHLORIDE, CALCIUM CHLORIDE 600; 310; 30; 20 MG/100ML; MG/100ML; MG/100ML; MG/100ML
100 INJECTION, SOLUTION INTRAVENOUS CONTINUOUS
Status: DISCONTINUED | OUTPATIENT
Start: 2021-01-01 | End: 2021-01-01

## 2021-01-01 RX ORDER — OXYCODONE HYDROCHLORIDE AND ACETAMINOPHEN 5; 325 MG/1; MG/1
1 TABLET ORAL ONCE AS NEEDED
Status: DISCONTINUED | OUTPATIENT
Start: 2021-01-01 | End: 2021-01-01 | Stop reason: HOSPADM

## 2021-01-01 RX ORDER — PHENYLEPHRINE HCL IN 0.9% NACL 1 MG/10 ML
SYRINGE (ML) INTRAVENOUS AS NEEDED
Status: DISCONTINUED | OUTPATIENT
Start: 2021-01-01 | End: 2021-01-01 | Stop reason: SURG

## 2021-01-01 RX ORDER — SODIUM CHLORIDE 0.9 % (FLUSH) 0.9 %
3 SYRINGE (ML) INJECTION EVERY 12 HOURS SCHEDULED
Status: DISCONTINUED | OUTPATIENT
Start: 2021-01-01 | End: 2021-01-01 | Stop reason: HOSPADM

## 2021-01-01 RX ORDER — KETAMINE HCL IN NACL, ISO-OSM 100MG/10ML
SYRINGE (ML) INJECTION AS NEEDED
Status: DISCONTINUED | OUTPATIENT
Start: 2021-01-01 | End: 2021-01-01 | Stop reason: SURG

## 2021-01-01 RX ORDER — LABETALOL HYDROCHLORIDE 5 MG/ML
5 INJECTION, SOLUTION INTRAVENOUS
Status: DISCONTINUED | OUTPATIENT
Start: 2021-01-01 | End: 2021-01-01 | Stop reason: HOSPADM

## 2021-01-01 RX ORDER — NICOTINE POLACRILEX 4 MG
15 LOZENGE BUCCAL
Status: DISCONTINUED | OUTPATIENT
Start: 2021-01-01 | End: 2021-01-01

## 2021-01-01 RX ORDER — IBUPROFEN 600 MG/1
600 TABLET ORAL ONCE AS NEEDED
Status: DISCONTINUED | OUTPATIENT
Start: 2021-01-01 | End: 2021-01-01 | Stop reason: HOSPADM

## 2021-01-01 RX ORDER — LIDOCAINE HYDROCHLORIDE 20 MG/ML
INJECTION, SOLUTION EPIDURAL; INFILTRATION; INTRACAUDAL; PERINEURAL AS NEEDED
Status: DISCONTINUED | OUTPATIENT
Start: 2021-01-01 | End: 2021-01-01 | Stop reason: SURG

## 2021-01-01 RX ORDER — PROPOFOL 10 MG/ML
VIAL (ML) INTRAVENOUS AS NEEDED
Status: DISCONTINUED | OUTPATIENT
Start: 2021-01-01 | End: 2021-01-01 | Stop reason: SURG

## 2021-01-01 RX ORDER — CEPHALEXIN 500 MG/1
500 CAPSULE ORAL 2 TIMES DAILY
Qty: 20 CAPSULE | Refills: 0 | Status: SHIPPED | OUTPATIENT
Start: 2021-01-01 | End: 2021-01-01 | Stop reason: HOSPADM

## 2021-01-01 RX ORDER — SODIUM CHLORIDE 0.9 % (FLUSH) 0.9 %
3 SYRINGE (ML) INJECTION AS NEEDED
Status: DISCONTINUED | OUTPATIENT
Start: 2021-01-01 | End: 2021-01-01 | Stop reason: HOSPADM

## 2021-01-01 RX ORDER — FENTANYL CITRATE 50 UG/ML
INJECTION, SOLUTION INTRAMUSCULAR; INTRAVENOUS AS NEEDED
Status: DISCONTINUED | OUTPATIENT
Start: 2021-01-01 | End: 2021-01-01 | Stop reason: SURG

## 2021-01-01 RX ORDER — PROMETHAZINE HYDROCHLORIDE 12.5 MG/1
6.25 SUPPOSITORY RECTAL EVERY 6 HOURS PRN
Status: DISCONTINUED | OUTPATIENT
Start: 2021-01-01 | End: 2021-01-01 | Stop reason: HOSPADM

## 2021-01-01 RX ORDER — OXYCODONE HYDROCHLORIDE 5 MG/1
5 TABLET ORAL EVERY 6 HOURS PRN
Status: DISCONTINUED | OUTPATIENT
Start: 2021-01-01 | End: 2021-01-01 | Stop reason: HOSPADM

## 2021-01-01 RX ORDER — LIDOCAINE HYDROCHLORIDE AND EPINEPHRINE 10; 10 MG/ML; UG/ML
10 INJECTION, SOLUTION INFILTRATION; PERINEURAL ONCE
Status: DISCONTINUED | OUTPATIENT
Start: 2021-01-01 | End: 2021-01-01

## 2021-01-01 RX ORDER — CEPHALEXIN 500 MG/1
500 CAPSULE ORAL 2 TIMES DAILY
Status: ON HOLD | COMMUNITY
End: 2021-01-01 | Stop reason: SDUPTHER

## 2021-01-01 RX ORDER — VALSARTAN 80 MG/1
80 TABLET ORAL 2 TIMES DAILY
Status: ON HOLD | COMMUNITY
End: 2022-01-01 | Stop reason: SDUPTHER

## 2021-01-01 RX ORDER — DROPERIDOL 2.5 MG/ML
0.62 INJECTION, SOLUTION INTRAMUSCULAR; INTRAVENOUS ONCE
Status: DISCONTINUED | OUTPATIENT
Start: 2021-01-01 | End: 2021-01-01 | Stop reason: HOSPADM

## 2021-01-01 RX ORDER — BUPIVACAINE HCL/0.9 % NACL/PF 0.1 %
2 PLASTIC BAG, INJECTION (ML) EPIDURAL ONCE
Status: COMPLETED | OUTPATIENT
Start: 2021-01-01 | End: 2021-01-01

## 2021-01-01 RX ORDER — ACETAMINOPHEN 325 MG/1
975 TABLET ORAL EVERY 8 HOURS
Qty: 100 TABLET | Refills: 2
Start: 2021-01-01 | End: 2022-12-03

## 2021-01-01 RX ORDER — HEPARIN SODIUM 5000 [USP'U]/ML
5000 INJECTION, SOLUTION INTRAVENOUS; SUBCUTANEOUS ONCE
Status: COMPLETED | OUTPATIENT
Start: 2021-01-01 | End: 2021-01-01

## 2021-01-01 RX ORDER — DEXAMETHASONE SODIUM PHOSPHATE 4 MG/ML
4 INJECTION, SOLUTION INTRA-ARTICULAR; INTRALESIONAL; INTRAMUSCULAR; INTRAVENOUS; SOFT TISSUE ONCE AS NEEDED
Status: COMPLETED | OUTPATIENT
Start: 2021-01-01 | End: 2021-01-01

## 2021-01-01 RX ORDER — DOBUTAMINE HYDROCHLORIDE 100 MG/100ML
10-50 INJECTION INTRAVENOUS CONTINUOUS
Status: CANCELLED | OUTPATIENT
Start: 2021-01-01

## 2021-01-01 RX ORDER — ONDANSETRON 2 MG/ML
4 INJECTION INTRAMUSCULAR; INTRAVENOUS ONCE
Status: COMPLETED | OUTPATIENT
Start: 2021-01-01 | End: 2021-01-01

## 2021-01-01 RX ORDER — IPRATROPIUM BROMIDE AND ALBUTEROL SULFATE 2.5; .5 MG/3ML; MG/3ML
3 SOLUTION RESPIRATORY (INHALATION) ONCE
Status: COMPLETED | OUTPATIENT
Start: 2021-01-01 | End: 2021-01-01

## 2021-01-01 RX ORDER — OXYCODONE HYDROCHLORIDE 5 MG/1
5 TABLET ORAL EVERY 8 HOURS PRN
Qty: 5 TABLET | Refills: 0 | Status: SHIPPED | OUTPATIENT
Start: 2021-01-01 | End: 2021-01-01 | Stop reason: SDUPTHER

## 2021-01-01 RX ORDER — ONDANSETRON 4 MG/1
4 TABLET, FILM COATED ORAL EVERY 8 HOURS PRN
Qty: 15 TABLET | Refills: 0 | Status: ON HOLD | OUTPATIENT
Start: 2021-01-01 | End: 2022-01-01

## 2021-01-01 RX ORDER — FLUMAZENIL 0.1 MG/ML
0.2 INJECTION INTRAVENOUS AS NEEDED
Status: DISCONTINUED | OUTPATIENT
Start: 2021-01-01 | End: 2021-01-01 | Stop reason: HOSPADM

## 2021-01-01 RX ORDER — OXYCODONE HYDROCHLORIDE 5 MG/1
5 TABLET ORAL EVERY 8 HOURS PRN
Qty: 10 TABLET | Refills: 0 | Status: ON HOLD | OUTPATIENT
Start: 2021-01-01 | End: 2021-01-01 | Stop reason: SDUPTHER

## 2021-01-01 RX ORDER — ROCURONIUM BROMIDE 10 MG/ML
INJECTION, SOLUTION INTRAVENOUS AS NEEDED
Status: DISCONTINUED | OUTPATIENT
Start: 2021-01-01 | End: 2021-01-01 | Stop reason: SURG

## 2021-01-01 RX ORDER — OXYCODONE AND ACETAMINOPHEN 7.5; 325 MG/1; MG/1
1 TABLET ORAL EVERY 4 HOURS PRN
Status: DISCONTINUED | OUTPATIENT
Start: 2021-01-01 | End: 2021-01-01 | Stop reason: HOSPADM

## 2021-01-01 RX ORDER — LEVOTHYROXINE SODIUM 0.07 MG/1
75 TABLET ORAL
Status: DISCONTINUED | OUTPATIENT
Start: 2021-01-01 | End: 2021-01-01 | Stop reason: HOSPADM

## 2021-01-01 RX ORDER — SODIUM CHLORIDE, SODIUM LACTATE, POTASSIUM CHLORIDE, CALCIUM CHLORIDE 600; 310; 30; 20 MG/100ML; MG/100ML; MG/100ML; MG/100ML
1000 INJECTION, SOLUTION INTRAVENOUS CONTINUOUS
Status: DISCONTINUED | OUTPATIENT
Start: 2021-01-01 | End: 2021-01-01 | Stop reason: HOSPADM

## 2021-01-01 RX ORDER — NEOSTIGMINE METHYLSULFATE 5 MG/5 ML
SYRINGE (ML) INTRAVENOUS AS NEEDED
Status: DISCONTINUED | OUTPATIENT
Start: 2021-01-01 | End: 2021-01-01 | Stop reason: SURG

## 2021-01-01 RX ORDER — CYCLOBENZAPRINE HCL 10 MG
10 TABLET ORAL EVERY 8 HOURS PRN
Status: DISCONTINUED | OUTPATIENT
Start: 2021-01-01 | End: 2021-01-01 | Stop reason: HOSPADM

## 2021-01-01 RX ORDER — ONDANSETRON 2 MG/ML
4 INJECTION INTRAMUSCULAR; INTRAVENOUS ONCE AS NEEDED
Status: DISCONTINUED | OUTPATIENT
Start: 2021-01-01 | End: 2021-01-01 | Stop reason: HOSPADM

## 2021-01-01 RX ORDER — OXYCODONE AND ACETAMINOPHEN 7.5; 325 MG/1; MG/1
2 TABLET ORAL EVERY 4 HOURS PRN
Status: DISCONTINUED | OUTPATIENT
Start: 2021-01-01 | End: 2021-01-01 | Stop reason: HOSPADM

## 2021-01-01 RX ORDER — ACETAMINOPHEN 500 MG
1000 TABLET ORAL EVERY 8 HOURS SCHEDULED
Status: DISCONTINUED | OUTPATIENT
Start: 2021-01-01 | End: 2021-01-01 | Stop reason: HOSPADM

## 2021-01-01 RX ORDER — CEPHALEXIN 500 MG/1
500 CAPSULE ORAL EVERY 12 HOURS SCHEDULED
Status: DISCONTINUED | OUTPATIENT
Start: 2021-01-01 | End: 2021-01-01 | Stop reason: HOSPADM

## 2021-01-01 RX ORDER — CEPHALEXIN 500 MG/1
500 CAPSULE ORAL 2 TIMES DAILY
Qty: 20 CAPSULE | Refills: 0 | Status: SHIPPED | OUTPATIENT
Start: 2021-01-01 | End: 2021-01-01

## 2021-01-01 RX ORDER — OXYCODONE HYDROCHLORIDE 5 MG/1
5 TABLET ORAL EVERY 8 HOURS PRN
Qty: 5 TABLET | Refills: 0 | Status: ON HOLD | OUTPATIENT
Start: 2021-01-01 | End: 2022-01-01

## 2021-01-01 RX ORDER — ONDANSETRON 2 MG/ML
INJECTION INTRAMUSCULAR; INTRAVENOUS AS NEEDED
Status: DISCONTINUED | OUTPATIENT
Start: 2021-01-01 | End: 2021-01-01 | Stop reason: SURG

## 2021-01-01 RX ORDER — MAGNESIUM HYDROXIDE 1200 MG/15ML
LIQUID ORAL AS NEEDED
Status: DISCONTINUED | OUTPATIENT
Start: 2021-01-01 | End: 2021-01-01 | Stop reason: HOSPADM

## 2021-01-01 RX ORDER — LIDOCAINE HYDROCHLORIDE 10 MG/ML
10 INJECTION, SOLUTION INFILTRATION; PERINEURAL ONCE
Status: DISCONTINUED | OUTPATIENT
Start: 2021-01-01 | End: 2021-01-01

## 2021-01-01 RX ORDER — ONDANSETRON 2 MG/ML
4 INJECTION INTRAMUSCULAR; INTRAVENOUS EVERY 6 HOURS PRN
Status: DISCONTINUED | OUTPATIENT
Start: 2021-01-01 | End: 2021-01-01 | Stop reason: HOSPADM

## 2021-01-01 RX ORDER — CYCLOBENZAPRINE HCL 5 MG
5 TABLET ORAL EVERY 8 HOURS PRN
Qty: 15 TABLET | Refills: 0 | Status: SHIPPED | OUTPATIENT
Start: 2021-01-01 | End: 2021-01-01

## 2021-01-01 RX ORDER — DEXTROSE MONOHYDRATE 25 G/50ML
25 INJECTION, SOLUTION INTRAVENOUS
Status: DISCONTINUED | OUTPATIENT
Start: 2021-01-01 | End: 2021-01-01

## 2021-01-01 RX ORDER — PROMETHAZINE HYDROCHLORIDE 25 MG/1
6.25 TABLET ORAL EVERY 6 HOURS PRN
Status: DISCONTINUED | OUTPATIENT
Start: 2021-01-01 | End: 2021-01-01 | Stop reason: HOSPADM

## 2021-01-01 RX ORDER — SODIUM CHLORIDE, SODIUM LACTATE, POTASSIUM CHLORIDE, CALCIUM CHLORIDE 600; 310; 30; 20 MG/100ML; MG/100ML; MG/100ML; MG/100ML
100 INJECTION, SOLUTION INTRAVENOUS CONTINUOUS
Status: DISCONTINUED | OUTPATIENT
Start: 2021-01-01 | End: 2021-01-01 | Stop reason: HOSPADM

## 2021-01-01 RX ORDER — ACETAMINOPHEN 500 MG
1000 TABLET ORAL ONCE
Status: COMPLETED | OUTPATIENT
Start: 2021-01-01 | End: 2021-01-01

## 2021-01-01 RX ORDER — SODIUM CHLORIDE, SODIUM LACTATE, POTASSIUM CHLORIDE, CALCIUM CHLORIDE 600; 310; 30; 20 MG/100ML; MG/100ML; MG/100ML; MG/100ML
1000 INJECTION, SOLUTION INTRAVENOUS CONTINUOUS
Status: DISCONTINUED | OUTPATIENT
Start: 2021-01-01 | End: 2021-01-01

## 2021-01-01 RX ORDER — OXYCODONE AND ACETAMINOPHEN 10; 325 MG/1; MG/1
1 TABLET ORAL ONCE AS NEEDED
Status: DISCONTINUED | OUTPATIENT
Start: 2021-01-01 | End: 2021-01-01 | Stop reason: HOSPADM

## 2021-01-01 RX ORDER — FENTANYL CITRATE 50 UG/ML
25 INJECTION, SOLUTION INTRAMUSCULAR; INTRAVENOUS
Status: DISCONTINUED | OUTPATIENT
Start: 2021-01-01 | End: 2021-01-01 | Stop reason: HOSPADM

## 2021-01-01 RX ORDER — DOBUTAMINE HYDROCHLORIDE 100 MG/100ML
10-50 INJECTION INTRAVENOUS CONTINUOUS
Status: DISCONTINUED | OUTPATIENT
Start: 2021-01-01 | End: 2021-01-01 | Stop reason: HOSPADM

## 2021-01-01 RX ORDER — ACETAMINOPHEN 500 MG
1000 TABLET ORAL ONCE
Status: DISCONTINUED | OUTPATIENT
Start: 2021-01-01 | End: 2021-01-01 | Stop reason: HOSPADM

## 2021-01-01 RX ORDER — SODIUM CHLORIDE 9 MG/ML
50 INJECTION, SOLUTION INTRAVENOUS CONTINUOUS
Status: DISCONTINUED | OUTPATIENT
Start: 2021-01-01 | End: 2021-01-01

## 2021-01-01 RX ORDER — DOCUSATE SODIUM 100 MG/1
100 CAPSULE, LIQUID FILLED ORAL 2 TIMES DAILY
Status: DISCONTINUED | OUTPATIENT
Start: 2021-01-01 | End: 2021-01-01 | Stop reason: HOSPADM

## 2021-01-01 RX ORDER — ONDANSETRON 4 MG/1
4 TABLET, FILM COATED ORAL EVERY 8 HOURS PRN
Qty: 15 TABLET | Refills: 0 | Status: SHIPPED | OUTPATIENT
Start: 2021-01-01 | End: 2022-12-21

## 2021-01-01 RX ORDER — METOPROLOL TARTRATE 5 MG/5ML
5 INJECTION INTRAVENOUS ONCE
Status: COMPLETED | OUTPATIENT
Start: 2021-01-01 | End: 2021-01-01

## 2021-01-01 RX ORDER — LEVOTHYROXINE SODIUM 0.07 MG/1
75 TABLET ORAL DAILY
COMMUNITY

## 2021-01-01 RX ADMIN — PROPOFOL 10 MG: 10 INJECTION, EMULSION INTRAVENOUS at 09:29

## 2021-01-01 RX ADMIN — OXYCODONE HYDROCHLORIDE 5 MG: 5 TABLET ORAL at 19:53

## 2021-01-01 RX ADMIN — LEVOTHYROXINE SODIUM 75 MCG: 75 TABLET ORAL at 06:31

## 2021-01-01 RX ADMIN — ENOXAPARIN SODIUM 40 MG: 40 INJECTION SUBCUTANEOUS at 08:56

## 2021-01-01 RX ADMIN — OXYCODONE HYDROCHLORIDE 5 MG: 5 TABLET ORAL at 11:47

## 2021-01-01 RX ADMIN — OXYCODONE HYDROCHLORIDE 5 MG: 5 TABLET ORAL at 19:56

## 2021-01-01 RX ADMIN — CEPHALEXIN 500 MG: 500 CAPSULE ORAL at 20:29

## 2021-01-01 RX ADMIN — ACETAMINOPHEN 1000 MG: 500 TABLET, FILM COATED ORAL at 06:06

## 2021-01-01 RX ADMIN — Medication 100 MCG: at 09:55

## 2021-01-01 RX ADMIN — PERFLUTREN 8.48 MG: 6.52 INJECTION, SUSPENSION INTRAVENOUS at 12:50

## 2021-01-01 RX ADMIN — FENTANYL CITRATE 25 MCG: 50 INJECTION, SOLUTION INTRAMUSCULAR; INTRAVENOUS at 09:39

## 2021-01-01 RX ADMIN — CEPHALEXIN 500 MG: 500 CAPSULE ORAL at 08:18

## 2021-01-01 RX ADMIN — PROPOFOL 20 MG: 10 INJECTION, EMULSION INTRAVENOUS at 09:39

## 2021-01-01 RX ADMIN — PROPOFOL 10 MG: 10 INJECTION, EMULSION INTRAVENOUS at 09:36

## 2021-01-01 RX ADMIN — CEPHALEXIN 500 MG: 500 CAPSULE ORAL at 08:26

## 2021-01-01 RX ADMIN — ACETAMINOPHEN 1000 MG: 500 TABLET, FILM COATED ORAL at 14:04

## 2021-01-01 RX ADMIN — Medication 100 MCG: at 10:41

## 2021-01-01 RX ADMIN — TRIAMTERENE AND HYDROCHLOROTHIAZIDE 1 TABLET: 75; 50 TABLET ORAL at 08:26

## 2021-01-01 RX ADMIN — ENOXAPARIN SODIUM 40 MG: 40 INJECTION SUBCUTANEOUS at 08:21

## 2021-01-01 RX ADMIN — FENTANYL CITRATE 50 MCG: 50 INJECTION, SOLUTION INTRAMUSCULAR; INTRAVENOUS at 10:20

## 2021-01-01 RX ADMIN — PROPOFOL 10 MG: 10 INJECTION, EMULSION INTRAVENOUS at 09:27

## 2021-01-01 RX ADMIN — Medication 10 MCG/KG/MIN: at 12:54

## 2021-01-01 RX ADMIN — FENTANYL CITRATE 50 MCG: 50 INJECTION, SOLUTION INTRAMUSCULAR; INTRAVENOUS at 11:07

## 2021-01-01 RX ADMIN — PROPOFOL 10 MG: 10 INJECTION, EMULSION INTRAVENOUS at 09:33

## 2021-01-01 RX ADMIN — LIDOCAINE HYDROCHLORIDE 100 MG: 20 INJECTION, SOLUTION EPIDURAL; INFILTRATION; INTRACAUDAL; PERINEURAL at 09:40

## 2021-01-01 RX ADMIN — VALSARTAN 80 MG: 80 TABLET, FILM COATED ORAL at 21:14

## 2021-01-01 RX ADMIN — Medication 10 MG: at 09:45

## 2021-01-01 RX ADMIN — OXYCODONE HYDROCHLORIDE 5 MG: 5 TABLET ORAL at 05:53

## 2021-01-01 RX ADMIN — ACETAMINOPHEN 1000 MG: 500 TABLET, FILM COATED ORAL at 05:43

## 2021-01-01 RX ADMIN — FENTANYL CITRATE 25 MCG: 50 INJECTION, SOLUTION INTRAMUSCULAR; INTRAVENOUS at 09:22

## 2021-01-01 RX ADMIN — FENTANYL CITRATE 25 MCG: 50 INJECTION INTRAMUSCULAR; INTRAVENOUS at 12:27

## 2021-01-01 RX ADMIN — ROCURONIUM BROMIDE 50 MG: 10 INJECTION INTRAVENOUS at 09:40

## 2021-01-01 RX ADMIN — OXYCODONE HYDROCHLORIDE 5 MG: 5 TABLET ORAL at 13:30

## 2021-01-01 RX ADMIN — DOCUSATE SODIUM 100 MG: 100 CAPSULE ORAL at 13:25

## 2021-01-01 RX ADMIN — ACETAMINOPHEN 1000 MG: 500 TABLET, FILM COATED ORAL at 08:52

## 2021-01-01 RX ADMIN — PROPOFOL 10 MG: 10 INJECTION, EMULSION INTRAVENOUS at 09:30

## 2021-01-01 RX ADMIN — CEPHALEXIN 500 MG: 500 CAPSULE ORAL at 08:56

## 2021-01-01 RX ADMIN — Medication 100 MCG: at 09:48

## 2021-01-01 RX ADMIN — Medication 1 TABLET: at 08:26

## 2021-01-01 RX ADMIN — ONDANSETRON 4 MG: 2 INJECTION INTRAMUSCULAR; INTRAVENOUS at 15:19

## 2021-01-01 RX ADMIN — DOCUSATE SODIUM 100 MG: 100 CAPSULE ORAL at 08:26

## 2021-01-01 RX ADMIN — PROPOFOL 20 MG: 10 INJECTION, EMULSION INTRAVENOUS at 09:25

## 2021-01-01 RX ADMIN — METOPROLOL TARTRATE 5 MG: 5 INJECTION INTRAVENOUS at 13:24

## 2021-01-01 RX ADMIN — ACETAMINOPHEN 1000 MG: 500 TABLET, FILM COATED ORAL at 13:30

## 2021-01-01 RX ADMIN — TRIAMTERENE AND HYDROCHLOROTHIAZIDE 1 TABLET: 75; 50 TABLET ORAL at 08:18

## 2021-01-01 RX ADMIN — FENTANYL CITRATE 50 MCG: 50 INJECTION, SOLUTION INTRAMUSCULAR; INTRAVENOUS at 10:56

## 2021-01-01 RX ADMIN — DEXAMETHASONE SODIUM PHOSPHATE 4 MG: 4 INJECTION, SOLUTION INTRA-ARTICULAR; INTRALESIONAL; INTRAMUSCULAR; INTRAVENOUS; SOFT TISSUE at 08:52

## 2021-01-01 RX ADMIN — GLYCOPYRROLATE 0.2 MG: 0.2 INJECTION, SOLUTION INTRAMUSCULAR; INTRAVENOUS at 11:06

## 2021-01-01 RX ADMIN — IPRATROPIUM BROMIDE AND ALBUTEROL SULFATE 3 ML: 2.5; .5 SOLUTION RESPIRATORY (INHALATION) at 08:52

## 2021-01-01 RX ADMIN — TRIAMTERENE AND HYDROCHLOROTHIAZIDE 1 TABLET: 75; 50 TABLET ORAL at 08:56

## 2021-01-01 RX ADMIN — LEVOTHYROXINE SODIUM 75 MCG: 75 TABLET ORAL at 05:05

## 2021-01-01 RX ADMIN — OXYCODONE HYDROCHLORIDE 5 MG: 5 TABLET ORAL at 14:02

## 2021-01-01 RX ADMIN — SODIUM CHLORIDE, POTASSIUM CHLORIDE, SODIUM LACTATE AND CALCIUM CHLORIDE 1000 ML: 600; 310; 30; 20 INJECTION, SOLUTION INTRAVENOUS at 08:12

## 2021-01-01 RX ADMIN — OXYCODONE HYDROCHLORIDE 5 MG: 5 TABLET ORAL at 03:26

## 2021-01-01 RX ADMIN — PROPOFOL 20 MG: 10 INJECTION, EMULSION INTRAVENOUS at 09:41

## 2021-01-01 RX ADMIN — LEVOTHYROXINE SODIUM 75 MCG: 75 TABLET ORAL at 05:43

## 2021-01-01 RX ADMIN — PROPOFOL 10 MG: 10 INJECTION, EMULSION INTRAVENOUS at 09:43

## 2021-01-01 RX ADMIN — ONDANSETRON 4 MG: 2 INJECTION INTRAMUSCULAR; INTRAVENOUS at 09:00

## 2021-01-01 RX ADMIN — PROPOFOL 20 MG: 10 INJECTION, EMULSION INTRAVENOUS at 09:22

## 2021-01-01 RX ADMIN — Medication 2 G: at 09:44

## 2021-01-01 RX ADMIN — ACETAMINOPHEN 1000 MG: 500 TABLET, FILM COATED ORAL at 22:47

## 2021-01-01 RX ADMIN — ACETAMINOPHEN 1000 MG: 500 TABLET, FILM COATED ORAL at 21:52

## 2021-01-01 RX ADMIN — ACETAMINOPHEN 1000 MG: 500 TABLET, FILM COATED ORAL at 21:24

## 2021-01-01 RX ADMIN — PROPOFOL 20 MG: 10 INJECTION, EMULSION INTRAVENOUS at 10:56

## 2021-01-01 RX ADMIN — OXYCODONE HYDROCHLORIDE 5 MG: 5 TABLET ORAL at 20:29

## 2021-01-01 RX ADMIN — Medication 2 MG: at 11:06

## 2021-01-01 RX ADMIN — SODIUM CHLORIDE 50 ML/HR: 9 INJECTION, SOLUTION INTRAVENOUS at 17:53

## 2021-01-01 RX ADMIN — TRIAMTERENE AND HYDROCHLOROTHIAZIDE 1 TABLET: 75; 50 TABLET ORAL at 08:46

## 2021-01-01 RX ADMIN — VALSARTAN 80 MG: 80 TABLET, FILM COATED ORAL at 08:26

## 2021-01-01 RX ADMIN — ENOXAPARIN SODIUM 40 MG: 40 INJECTION SUBCUTANEOUS at 08:46

## 2021-01-01 RX ADMIN — DOCUSATE SODIUM 100 MG: 100 CAPSULE ORAL at 21:15

## 2021-01-01 RX ADMIN — ACETAMINOPHEN 1000 MG: 500 TABLET, FILM COATED ORAL at 06:31

## 2021-01-01 RX ADMIN — SODIUM CHLORIDE, POTASSIUM CHLORIDE, SODIUM LACTATE AND CALCIUM CHLORIDE 1000 ML: 600; 310; 30; 20 INJECTION, SOLUTION INTRAVENOUS at 07:27

## 2021-01-01 RX ADMIN — FENTANYL CITRATE 25 MCG: 50 INJECTION, SOLUTION INTRAMUSCULAR; INTRAVENOUS at 09:34

## 2021-01-01 RX ADMIN — VALSARTAN 80 MG: 80 TABLET, FILM COATED ORAL at 13:25

## 2021-01-01 RX ADMIN — CEPHALEXIN 500 MG: 500 CAPSULE ORAL at 08:46

## 2021-01-01 RX ADMIN — ACETAMINOPHEN 1000 MG: 500 TABLET, FILM COATED ORAL at 21:14

## 2021-01-01 RX ADMIN — Medication 25 MG: at 09:40

## 2021-01-01 RX ADMIN — CEPHALEXIN 500 MG: 500 CAPSULE ORAL at 21:24

## 2021-01-01 RX ADMIN — ONDANSETRON 4 MG: 2 INJECTION INTRAMUSCULAR; INTRAVENOUS at 14:02

## 2021-01-01 RX ADMIN — Medication 10 MG: at 10:37

## 2021-01-01 RX ADMIN — ACETAMINOPHEN 1000 MG: 500 TABLET, FILM COATED ORAL at 05:06

## 2021-01-01 RX ADMIN — Medication 100 MCG: at 09:45

## 2021-01-01 RX ADMIN — ACETAMINOPHEN 1000 MG: 500 TABLET, FILM COATED ORAL at 15:00

## 2021-01-01 RX ADMIN — ONDANSETRON 4 MG: 2 INJECTION INTRAMUSCULAR; INTRAVENOUS at 11:06

## 2021-01-01 RX ADMIN — LIDOCAINE HYDROCHLORIDE 40 MG: 20 INJECTION, SOLUTION EPIDURAL; INFILTRATION; INTRACAUDAL; PERINEURAL at 09:22

## 2021-01-01 RX ADMIN — OXYCODONE HYDROCHLORIDE 5 MG: 5 TABLET ORAL at 05:08

## 2021-01-01 RX ADMIN — PROPOFOL 10 MG: 10 INJECTION, EMULSION INTRAVENOUS at 09:35

## 2021-01-01 RX ADMIN — CEPHALEXIN 500 MG: 500 CAPSULE ORAL at 21:52

## 2021-01-01 RX ADMIN — TRAMADOL HYDROCHLORIDE 50 MG: 50 TABLET ORAL at 08:26

## 2021-01-01 RX ADMIN — HEPARIN SODIUM 5000 UNITS: 5000 INJECTION, SOLUTION INTRAVENOUS; SUBCUTANEOUS at 08:52

## 2021-01-01 RX ADMIN — LEVOTHYROXINE SODIUM 75 MCG: 75 TABLET ORAL at 06:06

## 2021-01-01 RX ADMIN — FENTANYL CITRATE 25 MCG: 50 INJECTION INTRAMUSCULAR; INTRAVENOUS at 12:32

## 2021-01-01 RX ADMIN — Medication 5 MG: at 09:50

## 2021-01-01 RX ADMIN — ENOXAPARIN SODIUM 40 MG: 40 INJECTION SUBCUTANEOUS at 08:27

## 2021-01-01 RX ADMIN — PROPOFOL 140 MG: 10 INJECTION, EMULSION INTRAVENOUS at 09:40

## 2021-01-01 RX ADMIN — Medication 100 MCG: at 09:53

## 2021-01-01 RX ADMIN — SODIUM CHLORIDE, POTASSIUM CHLORIDE, SODIUM LACTATE AND CALCIUM CHLORIDE: 600; 310; 30; 20 INJECTION, SOLUTION INTRAVENOUS at 11:10

## 2021-01-01 RX ADMIN — Medication 2 G: at 09:24

## 2021-01-01 RX ADMIN — TRAMADOL HYDROCHLORIDE 50 MG: 50 TABLET ORAL at 08:17

## 2021-01-01 RX ADMIN — OXYCODONE HYDROCHLORIDE 5 MG: 5 TABLET ORAL at 18:20

## 2021-01-01 RX ADMIN — Medication 1 TABLET: at 13:25

## 2021-01-01 RX ADMIN — CEPHALEXIN 500 MG: 500 CAPSULE ORAL at 21:14

## 2021-01-01 RX ADMIN — FENTANYL CITRATE 25 MCG: 50 INJECTION, SOLUTION INTRAMUSCULAR; INTRAVENOUS at 09:28

## 2021-01-01 RX ADMIN — FENTANYL CITRATE 50 MCG: 50 INJECTION, SOLUTION INTRAMUSCULAR; INTRAVENOUS at 09:40

## 2021-01-01 RX ADMIN — TRAMADOL HYDROCHLORIDE 50 MG: 50 TABLET ORAL at 18:31

## 2021-03-17 NOTE — TELEPHONE ENCOUNTER
LM for pt regarding appt on 03/18. Left number for pt to return call if any questions or concerns arise.

## 2021-03-17 NOTE — PROGRESS NOTES
[facility] - PODIATRY    Today's Date: 03/18/21    Patient Name: Alissa Dominguez  MRN: [id number]  CSN: [id number]  PCP: Roland Cavazos MD  Referring Provider: No ref. provider found    SUBJECTIVE     Chief Complaint   Patient presents with   • Follow-up     pt is here for three month fu for diabetic foot and nail care - pt presents with long, thickened, discolored toenails- pt pain level 4/10 - pcp 05/13/20   • Diabetes     pt is unaware of last blood sugar reading     HPI: Alissa Dominguez, a 75 y.o.female, comes to clinic as a(n) established patient presenting for diabetic foot exam, complaining of painful toenails and complaining of edema with drainage of left leg. Patient has h/o COPD, DJD, HTN, GERD, hypothyroid, depression, Obesity, Sleep apnea, OA, hypercholesterolemia, borderline DM, Venous insufficiency. Relates borderline DM2.  Denies numbness in feet. Has followed with vascular surgery and uses lymphedema pump. Has compression stockings but does not wear them daily. Notes that her toenails are very long, thick, discolored and crumbly. She is unable to care for them herself. Notes return of calluses to her left foot. Admits pain at 4/10 level and described as shooting, aching and sharp. Relates previous treatment(s) including foot care by podiatrist. Denies any constitutional symptoms. No other pedal complaints at this time.    Past Medical History:   Diagnosis Date   • Cellulitis of left lower limb    • Cellulitis of right lower limb    • COPD (chronic obstructive pulmonary disease) (CMS/HCC)    • DJD (degenerative joint disease), cervical     DJD C Spine/Spondylolysis, Cervical Region   • Essential (primary) hypertension    • GERD (gastroesophageal reflux disease)    • Hypothyroidism    • Major depressive disorder, recurrent, moderate (CMS/HCC)    • Morbid (severe) obesity due to excess calories (CMS/HCC)    • Obstructive sleep apnea    • Primary generalized (osteo)arthritis    •  Pure hypercholesterolemia    • Tinea unguium    • Type 2 diabetes mellitus with hyperglycemia (CMS/HCC)    • Venous insufficiency (chronic) (peripheral)      Past Surgical History:   Procedure Laterality Date   • CATARACT EXTRACTION, BILATERAL     • ESSURE TUBAL LIGATION     • HYSTERECTOMY     • VARICOSE VEIN SURGERY Right      Family History   Problem Relation Age of Onset   • Hypertension Sister      Social History     Socioeconomic History   • Marital status:      Spouse name: Not on file   • Number of children: Not on file   • Years of education: Not on file   • Highest education level: Not on file   Tobacco Use   • Smoking status: Current Every Day Smoker     Packs/day: 1.00     Types: Electronic Cigarette   • Smokeless tobacco: Never Used   Vaping Use   • Vaping Use: Never used   Substance and Sexual Activity   • Alcohol use: No   • Drug use: No   • Sexual activity: Defer     Allergies   Allergen Reactions   • Augmentin [Amoxicillin-Pot Clavulanate] Other (See Comments)     Not Specified   • Ceclor [Cefaclor] Other (See Comments)     Not Specified     • Codeine Other (See Comments)     Not Specified   • Darvon [Propoxyphene] Other (See Comments)     Not Specified   • Fulvicin P-G [Griseofulvin] Other (See Comments)     Not Specified   • Niacin Other (See Comments)     Not Specified   • Valium [Diazepam] Other (See Comments)     Not Specified     Current Outpatient Medications   Medication Sig Dispense Refill   • Calcium Carb-Cholecalciferol (CALCIUM 600+D3) 600-200 MG-UNIT tablet Take  by mouth.     • Cholecalciferol (VITAMIN D-3) 1000 units capsule Take 1,000 Units by mouth Daily.     • coenzyme Q10 100 MG capsule Take 100 mg by mouth Daily.     • docusate sodium (COLACE) 100 MG capsule Take 100 mg by mouth 2 (Two) Times a Day.     • Fexofenadine HCl (MUCINEX ALLERGY PO) Take 1,200 mg by mouth Daily.     • Garlic 100 MG tablet Take  by mouth.     • Glucosamine-Chondroitin--400-200 MG tablet Take   by mouth.     • ibuprofen (ADVIL,MOTRIN) 200 MG tablet Take 200 mg by mouth Every 6 (Six) Hours As Needed for Mild Pain .     • levothyroxine (SYNTHROID, LEVOTHROID) 50 MCG tablet Take 50 mcg by mouth Daily.     • O2 (OXYGEN) Inhale 2 L/min 1 (One) Time.     • oxyCODONE-acetaminophen (PERCOCET)  MG per tablet Take 1 tablet by mouth Every 6 (Six) Hours As Needed for Moderate Pain .     • triamterene-hydrochlorothiazide (MAXZIDE) 75-50 MG per tablet Take 1 tablet by mouth Daily.     • verapamil (CALAN) 80 MG tablet Take 80 mg by mouth 2 (Two) Times a Day.       No current facility-administered medications for this visit.     Review of Systems   Constitutional: Negative for chills and fever.   HENT: Negative for congestion.    Respiratory: Negative for shortness of breath.    Cardiovascular: Positive for leg swelling. Negative for chest pain.   Gastrointestinal: Negative for constipation, diarrhea, nausea and vomiting.   Musculoskeletal: Positive for arthralgias and gait problem.        Foot pain   Skin: Negative for wound.   Neurological: Negative for numbness.       OBJECTIVE     Vitals:    21 1420   BP: 132/94   Pulse: 54   SpO2: (!) 89%       PHYSICAL EXAM  GEN:   Accompanied by none.     Foot/Ankle Exam:       General:   Diabetic Foot Exam Performed    Appearance: obesity    Orientation: AAOx3    Affect: appropriate    Gait: steppage    Assistance: walker    Shoe Gear:  Casual shoes    VASCULAR      Right Foot Vascularity   Dorsalis pedis:  1+  Posterior tibial:  2+  Skin Temperature: warm    Edema Gradin+ and pitting  CFT:  3  Varicosities: severe varicosities       Left Foot Vascularity   Dorsalis pedis:  1+  Posterior tibial:  2+  Skin Temperature: warm    Edema Gradin+ and pitting  CFT:  3  Varicosities: severe varicosities        NEUROLOGIC     Right Foot Neurologic   Normal sensation    Light touch sensation:  Normal  Vibratory sensation:  Normal  Hot/Cold sensation: normal     Protective Sensation using [place] Monofilament:  10     Left Foot Neurologic   Normal sensation    Light touch sensation:  Normal  Vibratory sensation:  Normal  Hot/cold sensation: normal    Protective Sensation using [place] Monofilament:  10     MUSCULOSKELETAL      Right Foot Musculoskeletal   Ecchymosis:  None  Tenderness: toenails    Arch:  Pes planus  Hammertoe:  Second toe, third toe, fourth toe and fifth toe  Hallux valgus: Yes (2nd toe overriding hallux)    Hallux limitus: No       Left Foot Musculoskeletal   Ecchymosis:  None  Tenderness: left foot callus and toenails    Arch:  Pes planus  Hammertoe:  Second toe, third toe, fifth toe and fourth toe  Hallux valgus: Yes    Hallux limitus: No       MUSCLE STRENGTH     Right Foot Muscle Strength   Foot dorsiflexion:  4+  Foot plantar flexion:  4+  Foot inversion:  4+  Foot eversion:  4+     Left Foot Muscle Strength   Foot dorsiflexion:  4+  Foot plantar flexion:  4+  Foot inversion:  4+  Foot eversion:  4+     RANGE OF MOTION      Right Foot Range of Motion   Foot and ankle ROM within normal limits       Left Foot Range of Motion   Foot and ankle ROM within normal limits       DERMATOLOGIC     Right Foot Dermatologic   Skin: atrophic    Skin: no right foot ulcer    Nails: onychomycosis, abnormally thick, subungual debris and dystrophic nails       Left Foot Dermatologic   Skin: corn and atrophic    Skin: no left foot ulcer    Nails: onychomycosis, abnormally thick, subungual debris and dystrophic nails       Image:        RADIOLOGY/NUCLEAR:  No results found.    LABORATORY/CULTURE RESULTS:      PATHOLOGY RESULTS:       ASSESSMENT/PLAN     Diagnoses and all orders for this visit:    1. Onychomycosis (Primary)    2. Foot callus    3. Borderline diabetic    4. Venous insufficiency (chronic) (peripheral)    5. Hallux valgus, right    6. Hallux valgus, left    7. Hammer toes of both feet    8. Foot pain, bilateral      Comprehensive lower  extremity examination and evaluation was performed.  Discussed findings and treatment plan including risks, benefits, and treatment options with patient in detail. Patient agreed with treatment plan.  After verbal consent obtained, nail(s) x10 debrided of length and thickness with nail nipper without incidence  After verbal consent obtained, calluses x3 pared utilizing dermal curette and/or scalpel without incidence  Patient may maintain nails and calluses at home utilizing emery board or pumice stone between visits as needed.    Continue with vascular surgery follow-ups.   Continue compression to BLE and use of lymphedema pumps.   An After Visit Summary was printed and given to the patient at discharge, including (if requested) any available informative/educational handouts regarding diagnosis, treatment, or medications. All questions were answered to patient/family satisfaction. Should symptoms fail to improve or worsen they agree to call or return to clinic or to go to the Emergency Department. Discussed the importance of following up with any needed screening tests/labs/specialist appointments and any requested follow-up recommended by me today. Importance of maintaining follow-up discussed and patient accepts that missed appointments can delay diagnosis and potentially lead to worsening of conditions.  Return in about 3 months (around 6/18/2021)., or sooner if acute issues arise.    Lab Frequency Next Occurrence       This document has been electronically signed by Jaspreet Ragland DPM on March 18, 2021 14:50 CDT

## 2021-06-11 NOTE — PROGRESS NOTES
[facility] - PODIATRY    Today's Date: 06/17/21    Patient Name: Alissa Dominguez  MRN: [id number]  CSN: [id number]  PCP: Roland Cavazos MD  Referring Provider: No ref. provider found    SUBJECTIVE     Chief Complaint   Patient presents with   • Follow-up     PCP05/03/2021 3 month follow up diabetic nail care- pt states cirrosis in the vein of her right leg, right above the knee. - pt has pain in feet- pt presents with severe discoloration of both lower extremities. toenails are thickened and discolored.    • Diabetes     pt unsure of last BG     HPI: Alissa Dominguez, a 75 y.o.female, comes to clinic as a(n) established patient presenting for diabetic foot exam, complaining of painful toenails and complaining of edema with drainage of left leg. Patient has h/o COPD, DJD, HTN, GERD, hypothyroid, depression, Obesity, Sleep apnea, OA, hypercholesterolemia, borderline DM, Venous insufficiency. Relates borderline DM2.  Denies numbness in feet. Has followed with vascular surgery and uses lymphedema pump. Has compression stockings but does not wear them daily. Notes that her toenails are very long, thick, discolored and crumbly. She is unable to care for them herself. Notes return of calluses to her left foot. Admits pain at 5/10 level and described as shooting, aching and sharp. Relates previous treatment(s) including foot care by podiatrist. Denies any constitutional symptoms. No other pedal complaints at this time.    Past Medical History:   Diagnosis Date   • Cellulitis of left lower limb    • Cellulitis of right lower limb    • COPD (chronic obstructive pulmonary disease) (CMS/HCC)    • DJD (degenerative joint disease), cervical     DJD C Spine/Spondylolysis, Cervical Region   • Essential (primary) hypertension    • GERD (gastroesophageal reflux disease)    • Hypothyroidism    • Major depressive disorder, recurrent, moderate (CMS/HCC)    • Morbid (severe) obesity due to excess calories (CMS/HCC)   "  • Obstructive sleep apnea    • Primary generalized (osteo)arthritis    • Pure hypercholesterolemia    • Tinea unguium    • Type 2 diabetes mellitus with hyperglycemia (CMS/HCC)    • Venous insufficiency (chronic) (peripheral)      Past Surgical History:   Procedure Laterality Date   • CATARACT EXTRACTION, BILATERAL     • ESSURE TUBAL LIGATION     • HYSTERECTOMY     • VARICOSE VEIN SURGERY Right      Family History   Problem Relation Age of Onset   • Hypertension Sister      Social History     Socioeconomic History   • Marital status:      Spouse name: Not on file   • Number of children: Not on file   • Years of education: Not on file   • Highest education level: Not on file   Tobacco Use   • Smoking status: Current Every Day Smoker     Packs/day: 1.00     Types: Electronic Cigarette   • Smokeless tobacco: Never Used   Vaping Use   • Vaping Use: Every day   • Substances: Nicotine, patient states it has \"3%\" not sure if it is THC or CBD   • Devices: Disposable   • Passive vaping exposure Yes   Substance and Sexual Activity   • Alcohol use: No   • Drug use: No   • Sexual activity: Defer     Allergies   Allergen Reactions   • Augmentin [Amoxicillin-Pot Clavulanate] Other (See Comments)     Not Specified   • Ceclor [Cefaclor] Other (See Comments)     Not Specified     • Codeine Other (See Comments)     Not Specified   • Darvon [Propoxyphene] Other (See Comments)     Not Specified   • Fulvicin P-G [Griseofulvin] Other (See Comments)     Not Specified   • Niacin Other (See Comments)     Not Specified   • Valium [Diazepam] Other (See Comments)     Not Specified     Current Outpatient Medications   Medication Sig Dispense Refill   • Calcium Carb-Cholecalciferol (CALCIUM 600+D3) 600-200 MG-UNIT tablet Take  by mouth.     • Cholecalciferol (VITAMIN D-3) 1000 units capsule Take 1,000 Units by mouth Daily.     • coenzyme Q10 100 MG capsule Take 100 mg by mouth Daily.     • docusate sodium (COLACE) 100 MG capsule Take " 100 mg by mouth 2 (Two) Times a Day.     • Fexofenadine HCl (MUCINEX ALLERGY PO) Take 1,200 mg by mouth Daily.     • Garlic 100 MG tablet Take  by mouth.     • Glucosamine-Chondroitin--400-200 MG tablet Take  by mouth.     • ibuprofen (ADVIL,MOTRIN) 200 MG tablet Take 200 mg by mouth Every 6 (Six) Hours As Needed for Mild Pain .     • levothyroxine (SYNTHROID, LEVOTHROID) 50 MCG tablet Take 50 mcg by mouth Daily.     • O2 (OXYGEN) Inhale 2 L/min 1 (One) Time.     • oxyCODONE-acetaminophen (PERCOCET)  MG per tablet Take 1 tablet by mouth Every 6 (Six) Hours As Needed for Moderate Pain .     • triamterene-hydrochlorothiazide (MAXZIDE) 75-50 MG per tablet Take 1 tablet by mouth Daily.     • verapamil (CALAN) 80 MG tablet Take 80 mg by mouth 2 (Two) Times a Day.       No current facility-administered medications for this visit.     Review of Systems   Constitutional: Negative for chills and fever.   HENT: Negative for congestion.    Respiratory: Negative for shortness of breath.    Cardiovascular: Positive for leg swelling. Negative for chest pain.   Gastrointestinal: Negative for constipation, diarrhea, nausea and vomiting.   Musculoskeletal: Positive for arthralgias and gait problem.        Foot pain   Skin: Negative for wound.   Neurological: Negative for numbness.       OBJECTIVE     Vitals:    21 1432   BP: 142/78   Pulse: 90   SpO2: (!) 87%       PHYSICAL EXAM  GEN:   Accompanied by none.     Foot/Ankle Exam:       General:   Diabetic Foot Exam Performed    Appearance: obesity    Orientation: AAOx3    Affect: appropriate    Gait: steppage    Assistance: walker    Shoe Gear:  Casual shoes    VASCULAR      Right Foot Vascularity   Dorsalis pedis:  1+  Posterior tibial:  2+  Skin Temperature: warm    Edema Gradin+ and pitting  CFT:  3  Varicosities: severe varicosities       Left Foot Vascularity   Dorsalis pedis:  1+  Posterior tibial:  2+  Skin Temperature: warm    Edema Gradin+ and  pitting  CFT:  3  Varicosities: severe varicosities        NEUROLOGIC     Right Foot Neurologic   Normal sensation    Light touch sensation:  Normal  Vibratory sensation:  Normal  Hot/Cold sensation: normal    Protective Sensation using [place] Monofilament:  10     Left Foot Neurologic   Normal sensation    Light touch sensation:  Normal  Vibratory sensation:  Normal  Hot/cold sensation: normal    Protective Sensation using [place] Monofilament:  10     MUSCULOSKELETAL      Right Foot Musculoskeletal   Ecchymosis:  None  Tenderness: right foot callus and toenails    Arch:  Pes planus  Hammertoe:  Second toe, third toe, fourth toe and fifth toe  Hallux valgus: Yes (2nd toe overriding hallux)    Hallux limitus: No       Left Foot Musculoskeletal   Ecchymosis:  None  Tenderness: left foot callus and toenails    Arch:  Pes planus  Hammertoe:  Second toe, third toe, fifth toe and fourth toe  Hallux valgus: Yes    Hallux limitus: No       MUSCLE STRENGTH     Right Foot Muscle Strength   Foot dorsiflexion:  4+  Foot plantar flexion:  4+  Foot inversion:  4+  Foot eversion:  4+     Left Foot Muscle Strength   Foot dorsiflexion:  4+  Foot plantar flexion:  4+  Foot inversion:  4+  Foot eversion:  4+     RANGE OF MOTION      Right Foot Range of Motion   Foot and ankle ROM within normal limits       Left Foot Range of Motion   Foot and ankle ROM within normal limits       DERMATOLOGIC     Right Foot Dermatologic   Skin: corn and atrophic    Skin: no right foot ulcer    Nails: onychomycosis, abnormally thick, subungual debris and dystrophic nails       Left Foot Dermatologic   Skin: corn and atrophic    Skin: no left foot ulcer    Nails: onychomycosis, abnormally thick, subungual debris and dystrophic nails       Image:        RADIOLOGY/NUCLEAR:  No results found.    LABORATORY/CULTURE RESULTS:      PATHOLOGY RESULTS:       ASSESSMENT/PLAN     Diagnoses and all orders for this visit:    1. Onychomycosis  (Primary)    2. Foot callus    3. Borderline diabetic    4. Venous insufficiency (chronic) (peripheral)    5. Hallux valgus, right    6. Hallux valgus, left    7. Hammer toes of both feet    8. Foot pain, bilateral      Comprehensive lower extremity examination and evaluation was performed.  Discussed findings and treatment plan including risks, benefits, and treatment options with patient in detail. Patient agreed with treatment plan.  After verbal consent obtained, nail(s) x10 debrided of length and thickness with nail nipper without incidence  After verbal consent obtained, calluses x4 pared utilizing dermal curette and/or scalpel without incidence  Patient may maintain nails and calluses at home utilizing emery board or pumice stone between visits as needed  Reviewed at home diabetic foot care including daily foot checks.    Continue with vascular surgery follow-ups.   Continue compression to BLE and use of lymphedema pumps.   Elevate legs when possible.   An After Visit Summary was printed and given to the patient at discharge, including (if requested) any available informative/educational handouts regarding diagnosis, treatment, or medications. All questions were answered to patient/family satisfaction. Should symptoms fail to improve or worsen they agree to call or return to clinic or to go to the Emergency Department. Discussed the importance of following up with any needed screening tests/labs/specialist appointments and any requested follow-up recommended by me today. Importance of maintaining follow-up discussed and patient accepts that missed appointments can delay diagnosis and potentially lead to worsening of conditions.  Return in about 3 months (around 9/17/2021)., or sooner if acute issues arise.    Lab Frequency Next Occurrence       This document has been electronically signed by Jaspreet Ragland DPM on June 17, 2021 15:14 CDT

## 2021-09-15 NOTE — PROGRESS NOTES
[facility] - PODIATRY    Today's Date: 09/21/21    Patient Name: Alissa Dominguez  MRN: [id number]  CSN: [id number]  PCP: Roland Cavazos MD  Referring Provider: No ref. provider found    SUBJECTIVE     Chief Complaint   Patient presents with   • Follow-up     pcp05/03/2021 3 month fu diabetic nail care- pt states  feet doing ok, coloring is worriesome- pt denies pain- pt presents with dicolored feet, thick nails , long.    • Diabetes     hasnt checked     HPI: Alissa Dominguez, a 75 y.o.female, comes to clinic as a(n) established patient presenting for diabetic foot exam, complaining of painful toenails and complaining of varicose veins. Patient has h/o COPD, DJD, HTN, GERD, hypothyroid, depression, Obesity, Sleep apnea, OA, hypercholesterolemia, borderline DM, Venous insufficiency. Relates borderline DM2.  Denies numbness or tingling in feet. Has followed with vascular surgery and uses lymphedema pump. States that nylon compression stockings were rubbing blisters and has switched to diabetic socks with light compression. Notes that her toenails are very long, thick, discolored and crumbly. She is unable to care for them herself. Notes return of calluses to her left foot. Denies pain at the present time. Relates previous treatment(s) including foot care by podiatrist. Denies any constitutional symptoms. No other pedal complaints at this time.    Past Medical History:   Diagnosis Date   • Cellulitis of left lower limb    • Cellulitis of right lower limb    • COPD (chronic obstructive pulmonary disease) (CMS/HCC)    • DJD (degenerative joint disease), cervical     DJD C Spine/Spondylolysis, Cervical Region   • Essential (primary) hypertension    • GERD (gastroesophageal reflux disease)    • Hypothyroidism    • Major depressive disorder, recurrent, moderate (CMS/HCC)    • Morbid (severe) obesity due to excess calories (CMS/HCC)    • Obstructive sleep apnea    • Primary generalized (osteo)arthritis   "  • Pure hypercholesterolemia    • Tinea unguium    • Type 2 diabetes mellitus with hyperglycemia (CMS/HCC)    • Venous insufficiency (chronic) (peripheral)      Past Surgical History:   Procedure Laterality Date   • CATARACT EXTRACTION, BILATERAL     • ESSURE TUBAL LIGATION     • HYSTERECTOMY     • VARICOSE VEIN SURGERY Right      Family History   Problem Relation Age of Onset   • Hypertension Sister      Social History     Socioeconomic History   • Marital status:      Spouse name: Not on file   • Number of children: Not on file   • Years of education: Not on file   • Highest education level: Not on file   Tobacco Use   • Smoking status: Current Every Day Smoker     Packs/day: 1.00     Types: Electronic Cigarette   • Smokeless tobacco: Never Used   Vaping Use   • Vaping Use: Every day   • Substances: Nicotine, patient states it has \"3%\" not sure if it is THC or CBD   • Devices: Disposable   • Passive vaping exposure Yes   Substance and Sexual Activity   • Alcohol use: No   • Drug use: No   • Sexual activity: Defer     Allergies   Allergen Reactions   • Augmentin [Amoxicillin-Pot Clavulanate] Other (See Comments)     Not Specified   • Ceclor [Cefaclor] Other (See Comments)     Not Specified     • Codeine Other (See Comments)     Not Specified   • Darvon [Propoxyphene] Other (See Comments)     Not Specified   • Fulvicin P-G [Griseofulvin] Other (See Comments)     Not Specified   • Niacin Other (See Comments)     Not Specified   • Valium [Diazepam] Other (See Comments)     Not Specified     Current Outpatient Medications   Medication Sig Dispense Refill   • Calcium Carb-Cholecalciferol (CALCIUM 600+D3) 600-200 MG-UNIT tablet Take  by mouth.     • Cholecalciferol (VITAMIN D-3) 1000 units capsule Take 1,000 Units by mouth Daily.     • coenzyme Q10 100 MG capsule Take 100 mg by mouth Daily.     • docusate sodium (COLACE) 100 MG capsule Take 100 mg by mouth 2 (Two) Times a Day.     • Fexofenadine HCl (MUCINEX " ALLERGY PO) Take 1,200 mg by mouth Daily.     • Garlic 100 MG tablet Take  by mouth.     • Glucosamine-Chondroitin--400-200 MG tablet Take  by mouth.     • ibuprofen (ADVIL,MOTRIN) 200 MG tablet Take 200 mg by mouth Every 6 (Six) Hours As Needed for Mild Pain .     • levothyroxine (SYNTHROID, LEVOTHROID) 50 MCG tablet Take 50 mcg by mouth Daily.     • O2 (OXYGEN) Inhale 2 L/min 1 (One) Time.     • oxyCODONE-acetaminophen (PERCOCET)  MG per tablet Take 1 tablet by mouth Every 6 (Six) Hours As Needed for Moderate Pain .     • triamterene-hydrochlorothiazide (MAXZIDE) 75-50 MG per tablet Take 1 tablet by mouth Daily.     • verapamil (CALAN) 80 MG tablet Take 80 mg by mouth 2 (Two) Times a Day.       No current facility-administered medications for this visit.     Review of Systems   Constitutional: Negative for chills and fever.   HENT: Negative for congestion.    Respiratory: Negative for shortness of breath.    Cardiovascular: Positive for leg swelling. Negative for chest pain.   Gastrointestinal: Negative for constipation, diarrhea, nausea and vomiting.   Musculoskeletal: Positive for arthralgias and gait problem.        Foot pain   Skin: Positive for color change. Negative for wound.   Neurological: Negative for numbness.       OBJECTIVE     There were no vitals filed for this visit.    PHYSICAL EXAM  GEN:   Accompanied by none.     Foot/Ankle Exam:       General:   Diabetic Foot Exam Performed    Appearance: obesity    Orientation: AAOx3    Affect: appropriate    Gait: steppage    Assistance: walker    Shoe Gear:  Casual shoes    VASCULAR      Right Foot Vascularity   Dorsalis pedis:  1+  Posterior tibial:  2+  Skin Temperature: warm    Edema Gradin+ and pitting  CFT:  3  Varicosities: severe varicosities       Left Foot Vascularity   Dorsalis pedis:  1+  Posterior tibial:  2+  Skin Temperature: warm    Edema Gradin+ and pitting  CFT:  3  Varicosities: severe varicosities         NEUROLOGIC     Right Foot Neurologic   Normal sensation    Light touch sensation:  Normal  Vibratory sensation:  Normal  Hot/Cold sensation: normal    Protective Sensation using [place] Monofilament:  10     Left Foot Neurologic   Normal sensation    Light touch sensation:  Normal  Vibratory sensation:  Normal  Hot/cold sensation: normal    Protective Sensation using [place] Monofilament:  10     MUSCULOSKELETAL      Right Foot Musculoskeletal   Ecchymosis:  None  Tenderness: right foot callus and toenails    Arch:  Pes planus  Hammertoe:  Second toe, third toe, fourth toe and fifth toe  Hallux valgus: Yes (2nd toe overriding hallux)    Hallux limitus: No       Left Foot Musculoskeletal   Ecchymosis:  None  Tenderness: left foot callus and toenails    Arch:  Pes planus  Hammertoe:  Second toe, third toe, fifth toe and fourth toe  Hallux valgus: Yes    Hallux limitus: No       MUSCLE STRENGTH     Right Foot Muscle Strength   Foot dorsiflexion:  4+  Foot plantar flexion:  4+  Foot inversion:  4+  Foot eversion:  4+     Left Foot Muscle Strength   Foot dorsiflexion:  4+  Foot plantar flexion:  4+  Foot inversion:  4+  Foot eversion:  4+     RANGE OF MOTION      Right Foot Range of Motion   Foot and ankle ROM within normal limits       Left Foot Range of Motion   Foot and ankle ROM within normal limits       DERMATOLOGIC     Right Foot Dermatologic   Skin: corn and atrophic    Skin: no right foot ulcer    Skin comment:  Hyperpigmentation of lower extremity  Nails: onychomycosis, abnormally thick, subungual debris and dystrophic nails       Left Foot Dermatologic   Skin: corn and atrophic    Skin: no left foot ulcer    Skin comment:  Hyperpigmentation of lower extremity  Nails: onychomycosis, abnormally thick, subungual debris and dystrophic nails       Image:        RADIOLOGY/NUCLEAR:  No results found.    LABORATORY/CULTURE RESULTS:      PATHOLOGY RESULTS:       ASSESSMENT/PLAN     Diagnoses and all  orders for this visit:    1. Onychomycosis (Primary)    2. Foot callus    3. Hammer toes of both feet    4. Hallux valgus, left    5. Hallux valgus, right    6. Foot pain, bilateral    7. Borderline diabetic    8. Venous insufficiency (chronic) (peripheral)      Comprehensive lower extremity examination and evaluation was performed.  Discussed findings and treatment plan including risks, benefits, and treatment options with patient in detail. Patient agreed with treatment plan.  After verbal consent obtained, nail(s) x10 debrided of length and thickness with nail nipper without incidence  After verbal consent obtained, calluses x6 pared utilizing dermal curette and/or scalpel without incidence  Patient may maintain nails and calluses at home utilizing emery board or pumice stone between visits as needed  Reviewed at home diabetic foot care including daily foot checks.    Continue with vascular surgery follow-ups.   Continue compression to BLE and use of lymphedema pumps.   Elevate legs when possible.   An After Visit Summary was printed and given to the patient at discharge, including (if requested) any available informative/educational handouts regarding diagnosis, treatment, or medications. All questions were answered to patient/family satisfaction. Should symptoms fail to improve or worsen they agree to call or return to clinic or to go to the Emergency Department. Discussed the importance of following up with any needed screening tests/labs/specialist appointments and any requested follow-up recommended by me today. Importance of maintaining follow-up discussed and patient accepts that missed appointments can delay diagnosis and potentially lead to worsening of conditions.  Return in about 3 months (around 12/21/2021)., or sooner if acute issues arise.    Lab Frequency Next Occurrence       This document has been electronically signed by Jaspreet Ragland DPM on September 21, 2021 14:30 CDT

## 2021-09-20 NOTE — TELEPHONE ENCOUNTER
.Called patient regarding appt on 09/21/2021. Left message for patient to return call if any questions or concerns arise.

## 2021-11-10 NOTE — DISCHARGE INSTRUCTIONS
DAY OF SURGERY INSTRUCTIONS          ARRIVAL TIME: AS DIRECTED BY OFFICE    YOU MAY TAKE THE FOLLOWING MEDICATION(S) THE MORNING OF SURGERY WITH A SIP OF WATER: PERCOCET      ALL OTHER HOME MEDICATION CHECK WITH YOUR PHYSICIAN                          MANAGING PAIN AFTER SURGERY    We know you are probably wondering what your pain will be like after surgery.  Following surgery it is unrealistic to expect you will not have pain.   Pain is how our bodies let us know that something is wrong or cautions us to be careful.  That said, our goal is to make your pain tolerable.    Methods we may use to treat your pain include (oral or IV medications, PCAs, epidurals, nerve blocks, etc.)   While some procedures require IV pain medications for a short time after surgery, transitioning to pain medications by mouth allows for better management of pain.   Your nurse will encourage you to take oral pain medications whenever possible.  IV medications work almost immediately, but only last a short while.  Taking medications by mouth allows for a more constant level of medication in your blood stream for a longer period of time.      Once your pain is out of control it is harder to get back under control.  It is important you are aware when your next dose of pain medication is due.  If you are admitted, your nurse may write the time of your next dose on the white board in your room to help you remember.      We are interested in your pain and encourage you to inform us about aggravating factors during your visit.   Many times a simple repositioning every few hours can make a big difference.    If your physician says it is okay, do not let your pain prevent you from getting out of bed. Be sure to call your nurse for assistance prior to getting up so you do not fall.      Before surgery, please decide your tolerable pain goal.  These faces help describe the pain ratings we use on a 0-10 scale.   Be prepared to tell us your goal and  whether or not you take pain or anxiety medications at home.          BEFORE YOU COME TO THE HOSPITAL  (Pre-op instructions)  • Do not eat, drink, smoke or chew gum after midnight the night before surgery.  This also includes no mints.  • Morning of surgery take only the medicines you have been instructed with a sip of water unless otherwise instructed  by your physician.  • Do not shave, wear makeup or dark nail polish.  • Remove all jewelry including rings.  • Leave anything you consider valuable at home.  • Leave your suitcase in the car until after your surgery.  • Bring the following with you if applicable:  o Picture ID and insurance, Medicare or Medicaid cards  o Co-pay/deductible required by insurance (cash, check, credit card)  o Copy of advance directive, living will or power-of- documents if not brought to pre-work  o CPAP or BIPAP mask and tubing  o Relaxation aids ( book, magazine), etc.  o Hearing aids                        ON THE DAY OF SURGERY  · On the day of surgery check in at registration located at the main entrance of the hospital. Only one family member or friend are allowed per patient.  ? You will be registered and given a beeper with instructions where to wait in the main lobby.  ? When your beeper lights up and vibrates a member of the Outpatient Surgery staff will meet you at the double doors under the stair steps and escort you to your preoperative room.   · You may have cloth compression devices placed on your legs. These help to prevent blood clots and reduce swelling in your legs.  · An IV may be inserted into one of your veins.  · In the operating room, you may be given one or more of the following:  ? A medicine to help you relax (sedative).  ? A medicine to numb the area (local anesthetic).  ? A medicine to make you fall asleep (general anesthetic).  ? A medicine that is injected into an area of your body to numb everything below the injection site (regional  "anesthetic).  · Your surgical site will be marked or identified.  · You may be given an antibiotic through your IV to help prevent infection.  Contact a health care provider if you:  · Develop a fever of more than 100.4°F (38°C) or other feelings of illness during the 48 hours before your surgery.  · Have symptoms that get worse.  Have questions or concerns about your surgery    General Anesthesia/Surgery, Adult  General anesthesia is the use of medicines to make a person \"go to sleep\" (unconscious) for a medical procedure. General anesthesia must be used for certain procedures, and is often recommended for procedures that:  · Last a long time.  · Require you to be still or in an unusual position.  · Are major and can cause blood loss.  The medicines used for general anesthesia are called general anesthetics. As well as making you unconscious for a certain amount of time, these medicines:  · Prevent pain.  · Control your blood pressure.  · Relax your muscles.  Tell a health care provider about:  · Any allergies you have.  · All medicines you are taking, including vitamins, herbs, eye drops, creams, and over-the-counter medicines.  · Any problems you or family members have had with anesthetic medicines.  · Types of anesthetics you have had in the past.  · Any blood disorders you have.  · Any surgeries you have had.  · Any medical conditions you have.  · Any recent upper respiratory, chest, or ear infections.  · Any history of:  ? Heart or lung conditions, such as heart failure, sleep apnea, asthma, or chronic obstructive pulmonary disease (COPD).  ?  service.  ? Depression or anxiety.  · Any tobacco or drug use, including marijuana or alcohol use.  · Whether you are pregnant or may be pregnant.  What are the risks?  Generally, this is a safe procedure. However, problems may occur, including:  · Allergic reaction.  · Lung and heart problems.  · Inhaling food or liquid from the stomach into the lungs " (aspiration).  · Nerve injury.  · Air in the bloodstream, which can lead to stroke.  · Extreme agitation or confusion (delirium) when you wake up from the anesthetic.  · Waking up during your procedure and being unable to move. This is rare.  These problems are more likely to develop if you are having a major surgery or if you have an advanced or serious medical condition. You can prevent some of these complications by answering all of your health care provider's questions thoroughly and by following all instructions before your procedure.  General anesthesia can cause side effects, including:  · Nausea or vomiting.  · A sore throat from the breathing tube.  · Hoarseness.  · Wheezing or coughing.  · Shaking chills.  · Tiredness.  · Body aches.  · Anxiety.  · Sleepiness or drowsiness.  · Confusion or agitation.  RISKS AND COMPLICATIONS OF SURGERY  Your health care provider will discuss possible risks and complications with you before surgery. Common risks and complications include:    · Problems due to the use of anesthetics.  · Blood loss and replacement (does not apply to minor surgical procedures).  · Temporary increase in pain due to surgery.  · Uncorrected pain or problems that the surgery was meant to correct.  · Infection.  · New damage.    What happens before the procedure?    Medicines  Ask your health care provider about:  · Changing or stopping your regular medicines. This is especially important if you are taking diabetes medicines or blood thinners.  · Taking medicines such as aspirin and ibuprofen. These medicines can thin your blood. Do not take these medicines unless your health care provider tells you to take them.  · Taking over-the-counter medicines, vitamins, herbs, and supplements. Do not take these during the week before your procedure unless your health care provider approves them.  General instructions  · Starting 3-6 weeks before the procedure, do not use any products that contain nicotine or  tobacco, such as cigarettes and e-cigarettes. If you need help quitting, ask your health care provider.  · If you brush your teeth on the morning of the procedure, make sure to spit out all of the toothpaste.  · Tell your health care provider if you become ill or develop a cold, cough, or fever.  · If instructed by your health care provider, bring your sleep apnea device with you on the day of your surgery (if applicable).  · Ask your health care provider if you will be going home the same day, the following day, or after a longer hospital stay.  ? Plan to have someone take you home from the hospital or clinic.  ? Plan to have a responsible adult care for you for at least 24 hours after you leave the hospital or clinic. This is important.  What happens during the procedure?  · You will be given anesthetics through both of the following:  ? A mask placed over your nose and mouth.  ? An IV in one of your veins.  · You may receive a medicine to help you relax (sedative).  · After you are unconscious, a breathing tube may be inserted down your throat to help you breathe. This will be removed before you wake up.  · An anesthesia specialist will stay with you throughout your procedure. He or she will:  ? Keep you comfortable and safe by continuing to give you medicines and adjusting the amount of medicine that you get.  ? Monitor your blood pressure, pulse, and oxygen levels to make sure that the anesthetics do not cause any problems.  The procedure may vary among health care providers and hospitals.  What happens after the procedure?  · Your blood pressure, temperature, heart rate, breathing rate, and blood oxygen level will be monitored until the medicines you were given have worn off.  · You will wake up in a recovery area. You may wake up slowly.  · If you feel anxious or agitated, you may be given medicine to help you calm down.  · If you will be going home the same day, your health care provider may check to make  sure you can walk, drink, and urinate.  · Your health care provider will treat any pain or side effects you have before you go home.  · Do not drive for 24 hours if you were given a sedative.  Summary  · General anesthesia is used to keep you still and prevent pain during a procedure.  · It is important to tell your healthcare provider about your medical history and any surgeries you have had, and previous experience with anesthesia.  · Follow your healthcare provider’s instructions about when to stop eating, drinking, or taking certain medicines before your procedure.  · Plan to have someone take you home from the hospital or clinic.  This information is not intended to replace advice given to you by your health care provider. Make sure you discuss any questions you have with your health care provider.  Document Released: 03/26/2009 Document Revised: 08/03/2018 Document Reviewed: 08/03/2018  [employer] Interactive Patient Education © 2019 [employer]       Fall Prevention in Hospitals, Adult  As a hospital patient, your condition and the treatments you receive can increase your risk for falls. Some additional risk factors for falls in a hospital include:  · Being in an unfamiliar environment.  · Being on bed rest.  · Your surgery.  · Taking certain medicines.  · Your tubing requirements, such as intravenous (IV) therapy or catheters.  It is important that you learn how to decrease fall risks while at the hospital. Below are important tips that can help prevent falls.  SAFETY TIPS FOR PREVENTING FALLS  Talk about your risk of falling.  · Ask your health care provider why you are at risk for falling. Is it your medicine, illness, tubing placement, or something else?  · Make a plan with your health care provider to keep you safe from falls.  · Ask your health care provider or pharmacist about side effects of your medicines. Some medicines can make you dizzy or affect your coordination.  Ask for help.  · Ask for help  before getting out of bed. You may need to press your call button.  · Ask for assistance in getting safely to the toilet.  · Ask for a walker or cane to be put at your bedside. Ask that most of the side rails on your bed be placed up before your health care provider leaves the room.  · Ask family or friends to sit with you.  · Ask for things that are out of your reach, such as your glasses, hearing aids, telephone, bedside table, or call button.  Follow these tips to avoid falling:  · Stay lying or seated, rather than standing, while waiting for help.  · Wear rubber-soled slippers or shoes whenever you walk in the hospital.  · Avoid quick, sudden movements.  ¨ Change positions slowly.  ¨ Sit on the side of your bed before standing.  ¨ Stand up slowly and wait before you start to walk.  · Let your health care provider know if there is a spill on the floor.  · Pay careful attention to the medical equipment, electrical cords, and tubes around you.  · When you need help, use your call button by your bed or in the bathroom. Wait for one of your health care providers to help you.  · If you feel dizzy or unsure of your footing, return to bed and wait for assistance.  · Avoid being distracted by the TV, telephone, or another person in your room.  · Do not lean or support yourself on rolling objects, such as IV poles or bedside tables.     This information is not intended to replace advice given to you by your health care provider. Make sure you discuss any questions you have with your health care provider.     Document Released: 12/15/2001 Document Revised: 01/08/2016 Document Reviewed: 08/25/2013  [employer] Interactive Patient Education ©2016 [employer]       [facility]  CHG 4% Patient Instruction Sheet    Chlorhexidine Before Surgery  Chlorhexidine gluconate (CHG) is a germ-killing (antiseptic) solution that is used to clean the skin. It gets rid of the bacteria that normally live on the skin. Cleaning your  skin with CHG before surgery helps lower the risk for infection after surgery.    How to use CHG solution  · You will take 2 showers, one shower the night before surgery, the second shower the morning of surgery before coming to the hospital.  · Use CHG only as told by your health care provider, and follow the instructions on the label.  · Use CHG solution while taking a shower. Follow these steps when using CHG solution (unless your health care provider gives you different instructions):  1. Start the shower.  2. Use your normal soap and shampoo to wash your face and hair.  3. Turn off the shower or move out of the shower stream.  4. Pour the CHG onto a clean washcloth. Do not use any type of brush or rough-edged sponge.  5. Starting at your neck, lather your body down to your toes. Make sure you:  6. Pay special attention to the part of your body where you will be having surgery. Scrub this area for at least 1 minute.  7. Use the full amount of CHG as directed. Usually, this is one half bottle for each shower.  8. Do not use CHG on your head or face. If the solution gets into your ears or eyes, rinse them well with water.  9. Avoid your genital area.  10. Avoid any areas of skin that have broken skin, cuts, or scrapes.  11. Scrub your back and under your arms. Make sure to wash skin folds.  12. Let the lather sit on your skin for 1-2 minutes or as long as told by your health care  provider.  13. Thoroughly rinse your entire body in the shower. Make sure that all body creases and crevices are rinsed well.  14. Dry off with a clean towel. Do not put any substances on your body afterward, such as powder, lotion, or perfume.  15. Put on clean clothes or pajamas.  16. If it is the night before your surgery, sleep in clean sheets.    What are the risks?  Risks of using CHG include:  · A skin reaction.  · Hearing loss, if CHG gets in your ears.  · Eye injury, if CHG gets in your eyes and is not rinsed out.  · The CHG  product catching fire.  Make sure that you avoid smoking and flames after applying CHG to your skin.  Do not use CHG:  · If you have a chlorhexidine allergy or have previously reacted to chlorhexidine.  · On babies younger than 2 months of age.      On the day of surgery, when you are taken to your room in Outpatient Surgery you will be given a CHG prepackaged cloth to wipe the site for your surgery.  How to use CHG prepackaged cloths  · Follow the instructions on the label.  · Use the CHG cloth on clean, dry skin. Follow these steps when using a CHG cloth (unless your health care provider gives you different instructions):  1. Using the CHG cloth, vigorously scrub the part of your body where you will be having surgery. Scrub using a back-and-forth motion for 3 minutes. The area on your body should be completely wet with CHG when you are finished scrubbing.  2. Do not rinse. Discard the cloth and let the area air-dry for 1 minute. Do not put any substances on your body afterward, such as powder, lotion, or perfume.  Contact a health care provider if:  · Your skin gets irritated after scrubbing.  · You have questions about using your solution or cloth.  Get help right away if:  · Your eyes become very red or swollen.  · Your eyes itch badly.  · Your skin itches badly and is red or swollen.  · Your hearing changes.  · You have trouble seeing.  · You have swelling or tingling in your mouth or throat.  · You have trouble breathing.  · You swallow any chlorhexidine.  Summary  · Chlorhexidine gluconate (CHG) is a germ-killing (antiseptic) solution that is used to clean the skin. Cleaning your skin with CHG before surgery helps lower the risk for infection after surgery.  · You may be given CHG to use at home. It may be in a bottle or in a prepackaged cloth to use on your skin. Carefully follow your health care provider's instructions and the instructions on the product label.  · Do not use CHG if you have a chlorhexidine  allergy.  · Contact your health care provider if your skin gets irritated after scrubbing.  This information is not intended to replace advice given to you by your health care provider. Make sure you discuss any questions you have with your health care provider.  Document Released: 09/11/2013 Document Revised: 11/15/2018 Document Reviewed: 11/15/2018  [employer] Interactive Patient Education © 2019 [employer]          PATIENT/FAMILY/RESPONSIBLE PARTY VERBALIZES UNDERSTANDING OF ABOVE EDUCATION.  COPY OF PAIN SCALE GIVEN AND REVIEWED WITH VERBALIZED UNDERSTANDING.

## 2021-11-15 PROBLEM — Z01.818 PREOPERATIVE EVALUATION TO RULE OUT SURGICAL CONTRAINDICATION: Status: ACTIVE | Noted: 2021-01-01

## 2021-11-15 PROBLEM — R94.31 ABNORMAL EKG: Status: ACTIVE | Noted: 2021-01-01

## 2021-11-15 PROBLEM — R06.02 SHORTNESS OF BREATH: Status: ACTIVE | Noted: 2021-01-01

## 2021-11-15 NOTE — PROGRESS NOTES
[facility] - CARDIOLOGY  New Patient Initial Outpatient Evaulation    Primary Care Physician: Roland Cavazos MD    Subjective     Chief Complaint   Patient presents with   • Surgical Clearance     NEW PT DR. TAVAREZ    • Shortness of Breath        History of Present Illness    Patient is a very pleasant 75-year-old female with a past medical history of hypertension, hyperlipidemia, vascular insufficiency, hypothyroidism, diabetes and COPD who presents to the cardiology clinic today for initial evaluation.  Patient was recently discovered to have breast cancer and needs to undergo mastectomy with Dr. Tavarez in the near future.    She has no cardiac disease that she knows of.  The daughter present in the room says she had a stress test many years ago and was told that no further testing was needed and lifestyle changes was recommended.  She has also undergone a left heart catheterization in the past and was told there was no significant disease.    She denies any current episodes of chest pain but does have significant dyspnea on exertion.  She is not very active and spends the majority of her time sitting in her chair.  Her current cardiac meds include Maxzide and verapamil.    Review of Systems   Constitutional: Negative for diaphoresis, fever and malaise/fatigue.   HENT: Negative for congestion.    Eyes: Negative for vision loss in left eye and vision loss in right eye.   Cardiovascular: Positive for dyspnea on exertion. Negative for chest pain, claudication, irregular heartbeat, leg swelling, orthopnea, palpitations and syncope.   Respiratory: Positive for shortness of breath. Negative for cough and wheezing.    Hematologic/Lymphatic: Negative for adenopathy.   Skin: Negative for rash.   Musculoskeletal: Positive for joint pain. Negative for joint swelling.   Gastrointestinal: Negative for abdominal pain, diarrhea, nausea and vomiting.   Neurological: Negative for excessive daytime sleepiness, dizziness,  focal weakness, light-headedness, numbness and weakness.   Psychiatric/Behavioral: Negative for depression. The patient does not have insomnia.         Otherwise complete ROS reviewed and negative except as mentioned in the HPI.      Past Medical History:   Past Medical History:   Diagnosis Date   • Cataracts, bilateral    • Cellulitis of left lower limb    • Cellulitis of right lower limb    • COPD (chronic obstructive pulmonary disease) ([facility])    • DJD (degenerative joint disease), cervical     DJD C Spine/Spondylolysis, Cervical Region   • Essential (primary) hypertension    • GERD (gastroesophageal reflux disease)    • Hypothyroidism    • Major depressive disorder, recurrent, moderate ([facility])    • Migraine    • Morbid (severe) obesity due to excess calories ([facility])    • Obstructive sleep apnea    • Primary generalized (osteo)arthritis    • Pure hypercholesterolemia    • Tinea unguium    • Type 2 diabetes mellitus with hyperglycemia ([facility])    • Venous insufficiency (chronic) (peripheral)        Past Surgical History:  Past Surgical History:   Procedure Laterality Date   • CATARACT EXTRACTION, BILATERAL     • CATARACT EXTRACTION, BILATERAL     • ESSURE TUBAL LIGATION     • HYSTERECTOMY     • VARICOSE VEIN SURGERY Right        Family History: family history includes Heart disease in her mother; Hypertension in her sister.    Social History:  reports that she has been smoking electronic cigarette. She has been smoking about 1.00 pack per day. She has never used smokeless tobacco. She reports that she does not drink alcohol and does not use drugs.    Medications:  Prior to Admission medications    Medication Sig Start Date End Date Taking? Authorizing Provider   Calcium Carb-Cholecalciferol (CALCIUM 600+D3) 600-200 MG-UNIT tablet Take  by mouth.   Yes Chelsie Barksdale MD   Cholecalciferol (VITAMIN D-3) 1000 units capsule Take 1,000 Units by mouth Daily.   Yes Chelsie Barksdale MD   coenzyme Q10 100 MG capsule Take  "100 mg by mouth Daily.   Yes Chelsie Barksdale MD   docusate sodium (COLACE) 100 MG capsule Take 100 mg by mouth 2 (Two) Times a Day.   Yes Chelsie Barksdale MD   Fexofenadine HCl (MUCINEX ALLERGY PO) Take 1,200 mg by mouth Daily.   Yes Chelsie Barksdale MD   Garlic 100 MG tablet Take  by mouth.   Yes Chelsie Barksdale MD   Glucosamine-Chondroitin--400-200 MG tablet Take  by mouth.   Yes Chelsie Barksdale MD   ibuprofen (ADVIL,MOTRIN) 200 MG tablet Take 200 mg by mouth Every 6 (Six) Hours As Needed for Mild Pain .   Yes Chelsie Barksdale MD   levothyroxine (SYNTHROID, LEVOTHROID) 50 MCG tablet Take 50 mcg by mouth Daily.   Yes Chelsie Barksdale MD   O2 (OXYGEN) Inhale 2 L/min 1 (One) Time.   Yes Chelsie Barksdale MD   oxyCODONE-acetaminophen (PERCOCET)  MG per tablet Take 1 tablet by mouth Every 6 (Six) Hours As Needed for Moderate Pain .   Yes Chelsie Barksdale MD   triamterene-hydrochlorothiazide (MAXZIDE) 75-50 MG per tablet Take 1 tablet by mouth Daily.   Yes Chelsie Barksdale MD   verapamil (CALAN) 80 MG tablet Take 80 mg by mouth 2 (Two) Times a Day.   Yes Chelsie Barksdale MD     Allergies:  Allergies   Allergen Reactions   • Augmentin [Amoxicillin-Pot Clavulanate] Other (See Comments)     UNKNOWN   • Ceclor [Cefaclor] Other (See Comments)     UNKNOWN     • Codeine Other (See Comments)     Not Specified   • Darvon [Propoxyphene] Other (See Comments)     Not Specified   • Fulvicin P-G [Griseofulvin] Other (See Comments)     Not Specified   • Niacin Other (See Comments)     Not Specified   • Valium [Diazepam] Other (See Comments)     Not Specified       Objective     Vital Signs: /84   Pulse 83   Ht 170 cm (66.93\")   Wt 95.7 kg (211 lb)   BMI 33.12 kg/m²     Vitals and nursing note reviewed.   Constitutional:       Appearance: Normal and healthy appearance. Well-developed and not in distress.   Eyes:      Extraocular Movements: Extraocular " movements intact.      Pupils: Pupils are equal, round, and reactive to light.   HENT:      Head: Normocephalic and atraumatic.    Mouth/Throat:      Pharynx: Oropharynx is clear.   Neck:      Vascular: JVD normal.      Trachea: Trachea normal.   Pulmonary:      Effort: Pulmonary effort is normal.      Breath sounds: Normal breath sounds. No wheezing. No rhonchi. No rales.   Cardiovascular:      PMI at left midclavicular line. Normal rate. Regular rhythm. Normal S1. Normal S2.      Murmurs: There is no murmur.      No gallop. No click. No rub.   Pulses:     Dorsalis pedis: 2+ bilaterally.     Posterior tibial: 2+ bilaterally.  Abdominal:      General: Bowel sounds are normal.      Palpations: Abdomen is soft.      Tenderness: There is no abdominal tenderness.   Musculoskeletal: Normal range of motion.      Cervical back: Normal range of motion and neck supple. Skin:     General: Skin is warm and dry.      Capillary Refill: Capillary refill takes less than 2 seconds.   Feet:      Right foot:      Skin integrity: Skin integrity normal.      Left foot:      Skin integrity: Skin integrity normal.   Neurological:      Mental Status: Alert and oriented to person, place and time.      Cranial Nerves: Cranial nerves are intact.      Sensory: Sensation is intact.      Motor: Motor function is intact.      Coordination: Coordination is intact.   Psychiatric:         Speech: Speech normal.         Behavior: Behavior is cooperative.         Results Reviewed:    Procedures      No results found for: CHOL, TRIG, HDL, VLDL, LDLHDL  No results found for: HGBA1C    Assessment / Plan        Problem List Items Addressed This Visit        Cardiac and Vasculature    Venous insufficiency (chronic) (peripheral)    Pure hypercholesterolemia    Essential (primary) hypertension    Abnormal EKG    Relevant Orders    Adult Stress Echo W/ Cont or Stress Agent if Necessary Per Protocol       Endocrine and Metabolic    Type 2 diabetes mellitus  with hyperglycemia (HCC)       Health Encounters    Preoperative evaluation to rule out surgical contraindication       Pulmonary and Pneumonias    Shortness of breath - Primary    Relevant Orders    Adult Stress Echo W/ Cont or Stress Agent if Necessary Per Protocol          Plan:    75-year-old female with history of hypertension, hyperlipidemia, diabetes and COPD presents to the clinic today for initial evaluation.  She is in need of surgery in the very near future for breast cancer.  Unable to determine her functional status as she spends the majority of her time sitting in her chair at home.  She was able to get up and walk around the room with help today in the office.  Her EKG obtained 5 days ago shows a normal sinus rhythm with a right bundle branch block pattern.    Cardiology recommendations:  1.  I am going to schedule her for a stress echocardiogram to see if we can get an idea of what her functional status is and make sure there is no obvious ischemia that may be present before she undergoes surgery in the near future.  Once we have the results of this test, recommend the patient undergo surgery with Dr. Tavarez.  2.  Do not recommend any changes to her cardiac regimen at this time.  Her blood pressure and heart rate are well controlled.  Continue on current doses of Maxzide and verapamil.      Thank you Pb for this referral.  Please call or text me with any questions.  My cell phone number is [phone].        Martin Vasquez, DO   Interventional cardiology  [facility]  11/15/21   12:45 CST

## 2021-11-30 PROBLEM — Z17.0 MALIGNANT NEOPLASM OF UPPER-OUTER QUADRANT OF BREAST IN FEMALE, ESTROGEN RECEPTOR POSITIVE (HCC): Status: ACTIVE | Noted: 2021-01-01

## 2021-11-30 PROBLEM — C50.419 MALIGNANT NEOPLASM OF UPPER-OUTER QUADRANT OF BREAST IN FEMALE, ESTROGEN RECEPTOR POSITIVE (HCC): Status: ACTIVE | Noted: 2021-01-01

## 2021-11-30 PROBLEM — N63.0 BREAST MASS: Status: ACTIVE | Noted: 2021-01-01

## 2021-11-30 NOTE — ANESTHESIA PROCEDURE NOTES
Airway  Urgency: elective    Date/Time: 11/30/2021 9:42 AM  Airway not difficult    General Information and Staff    Patient location during procedure: OR  CRNA: Naye Mujica CRNA    Indications and Patient Condition  Indications for airway management: airway protection    Preoxygenated: yes  MILS maintained throughout  Mask difficulty assessment: 1 - vent by mask    Final Airway Details  Final airway type: endotracheal airway      Successful airway: ETT    Successful intubation technique: direct laryngoscopy  Facilitating devices/methods: intubating stylet  Endotracheal tube insertion site: oral  Blade: Teri  Blade size: 3  ETT size (mm): 7.0  Cormack-Lehane Classification: grade I - full view of glottis  Placement verified by: chest auscultation and capnometry   Measured from: gums  ETT/EBT to gums (cm): 19  Number of attempts at approach: 1  Assessment: lips, teeth, and gum same as pre-op and atraumatic intubation    Additional Comments  Intubation by Trisha Stanley SRNA

## 2021-11-30 NOTE — ANESTHESIA PREPROCEDURE EVALUATION
Anesthesia Evaluation     Patient summary reviewed and Nursing notes reviewed   no history of anesthetic complications:  NPO Solid Status: > 8 hours  NPO Liquid Status: > 8 hours           Airway   Mallampati: II  TM distance: >3 FB  Neck ROM: full  Possible difficult intubation  Dental    (+) upper dentures    Pulmonary    (+) COPD, home oxygen, shortness of breath, sleep apnea,   Cardiovascular   Exercise tolerance: poor (<4 METS)    ECG reviewed    (+) hypertension, dysrhythmias PAC, hyperlipidemia,     ROS comment: Low risk stress echo 11/2021    Echo  · Left ventricular systolic function is low normal. Estimated EF appears to be in the range of 51 - 55%.  · Left ventricular diastolic dysfunction (grade I) consistent with impaired relaxation.  · Right ventricular cavity is mildly dilated. Mildly reduced right ventricular systolic function noted.  · Trace tricuspid valve regurgitation is present. Estimated right ventricular systolic pressure from tricuspid regurgitation is mildly elevated (35-45 mmHg).        Neuro/Psych  (+) headaches, psychiatric history Depression,     (-) seizures, TIA, CVA  GI/Hepatic/Renal/Endo    (+) obesity,  GERD,  diabetes mellitus, thyroid problem hypothyroidism    Musculoskeletal     Abdominal    Substance History      OB/GYN          Other   arthritis,          Phys Exam Other: Moderate kyphosis, good neck extension                Anesthesia Plan    ASA 3     general   (Pt on home o2, did not bring her protable oxygen with her. Discussed possible need for prolonged monitoring, vs possible overnight admission. )  intravenous induction     Anesthetic plan, all risks, benefits, and alternatives have been provided, discussed and informed consent has been obtained with: patient.

## 2021-11-30 NOTE — ANESTHESIA POSTPROCEDURE EVALUATION
Patient: Alissa Dominguez    Procedure Summary     Date: 11/30/21 Room / Location:  [facility] OR  /  [facility] OR    Anesthesia Start: 0934 Anesthesia Stop: 1156    Procedure: LEFT MASTECTOMY WITH SENTINEL LYMPH NODE BIOPSY WITH MAGTRACE (Left Breast) Diagnosis: (BREAST MASS)    Surgeons: Naye Tavarez MD Provider: Naye Mujica CRNA    Anesthesia Type: general ASA Status: 3          Anesthesia Type: general    Vitals  Vitals Value Taken Time   /86 11/30/21 1335   Temp 97.5 °F (36.4 °C) 11/30/21 1335   Pulse 83 11/30/21 1335   Resp 16 11/30/21 1335   SpO2 91 % 11/30/21 1335           Post Anesthesia Care and Evaluation    PONV Status: none  Comments: Patient d/c from PACU prior to anes eval based on Danial score.  Please see RN notes for details of d/c criteria.    Blood pressure 130/85, pulse 84, temperature 97.6 °F (36.4 °C), temperature source Oral, resp. rate 18, SpO2 91 %.

## 2021-12-01 NOTE — PLAN OF CARE
Goal Outcome Evaluation:  Plan of Care Reviewed With: patient        Progress: improving  Outcome Summary: Pt c/o pain this shift, see MAR. Voiding. AILYN with minimal serosang output. Ambulating to the bathroom with her walker. Gauze/ tape and ace wrap around the chest. Sleeping in the chair. Confused at times but the daughter says that is baseline. VSS. Safety maintained.

## 2021-12-01 NOTE — PLAN OF CARE
Goal Outcome Evaluation:  Plan of Care Reviewed With: patient        Progress: no change  Outcome Summary: OT eval completed. Pt awake and alert sitting up in chair, c/o not liking the food but no complaints symptomatically. She is somewhat confused when speaking to her but is oriented x4. Reports she lives alone and was previously independent with ADL/IADL and fxl mobility. Upon entering room pt on RA, holding nc, satting 87%. When replaced nc pt increased to 92% after 2 min rest and pursed lip breathing cues. Amb to BR, senior living through insisted on taking off O2. SHe is able to perform toileting hygiene with SBA for balance, MaxA for clothing management 2' cognition and decreased awareness. Pt is mildly unsteady requiring CGA for safety, no true LOB and decreased gait speed with return to chair. Pt satting 86% upon return to chair, quickly returned to 94% when nc replaced, educated on need for continued wear. OT indicated to address strength, endurance/ activity tolerance, and balance to increase independence and decrease fall risk. Recommend d/c SNF.

## 2021-12-01 NOTE — CASE MANAGEMENT/SOCIAL WORK
Continued Stay Note  [facility]     Patient Name: Alissa Dominguez  MRN: [id number]  Today's Date: 12/1/2021    Admit Date: 11/30/2021     Discharge Plan     Row Name 12/01/21 1534       Plan    Plan SNF Referrals    Patient/Family in Agreement with Plan yes    Plan Comments [facility] is not in network with pt's insurance. [facility] does not have beds. Spoke with daughter again and she asked for [facility]. Spoke with [facility] and pt does not meet criteria. Then discussed [facility] facilities and checked [facility] but they are not in network. Now checking with [facility] and daughter is going to check with family. Regardless where pt goes, precert cannot be started until PT evaluates pt.    Addendum: Rec'd a call from pt's daughter again and she requests to check [facility] as well. Referral made.                Discharge Codes    No documentation.                     JERMAINE Del Castillo

## 2021-12-01 NOTE — CASE MANAGEMENT/SOCIAL WORK
Discharge Planning Assessment  [facility]     Patient Name: Alissa Dominguez  MRN: [id number]  Today's Date: 12/1/2021    Admit Date: 11/30/2021     Discharge Needs Assessment     Row Name 12/01/21 1025       Living Environment    Lives With alone    Current Living Arrangements home/apartment/condo    Primary Care Provided by self    Provides Primary Care For no one    Family Caregiver if Needed child(girish), adult    Quality of Family Relationships helpful; involved; supportive    Able to Return to Prior Arrangements no       Transition Planning    Patient/Family Anticipates Transition to inpatient rehabilitation facility    Patient/Family Anticipated Services at Transition skilled nursing    Transportation Anticipated family or friend will provide       Discharge Needs Assessment    Readmission Within the Last 30 Days no previous admission in last 30 days    Equipment Currently Used at Home cane, quad; walker, rolling    Concerns to be Addressed adjustment to diagnosis/illness; basic needs; care coordination/care conferences; discharge planning    Anticipated Changes Related to Illness inability to care for self    Outpatient/Agency/Support Group Needs skilled nursing facility    Discharge Facility/Level of Care Needs nursing facility, skilled    Provided Post Acute Provider List? Yes    Post Acute Provider List Nursing Home    Provided Post Acute Provider Quality & Resource List? Yes    Post Acute Provider Quality and Resource List Nursing Home    Delivered To Patient; Support Person    Method of Delivery In person    Current Discharge Risk lives alone    Discharge Coordination/Progress Pt lives at home alone. Spoke with her and her daughter in the room. Pt says she knows she needs to go to rehab. Discussed options on the CMS compare list and they request [facility] and [facility]. Referral made.               Discharge Plan    No documentation.               Continued Care and Services - Admitted Since  11/30/2021    Coordination has not been started for this encounter.          Demographic Summary    No documentation.                Functional Status    No documentation.                Psychosocial    No documentation.                Abuse/Neglect    No documentation.                Legal    No documentation.                Substance Abuse    No documentation.                Patient Forms    No documentation.                   JERMAINE Del Castillo

## 2021-12-01 NOTE — PLAN OF CARE
Goal Outcome Evaluation:  Plan of Care Reviewed With: patient        Progress: no change  Outcome Summary: Pt medicated x1 with PO pain medicine. Scheduled tylenol given per order. Up to chair for most of day. PT/OT. Tolerating regular diet. Voiding. L chest d/i with gazue/medipore tape/ace bandage. JPx1. VSS.

## 2021-12-01 NOTE — THERAPY EVALUATION
Patient Name: Alissa Dominguez  : 1945    MRN: [id number]                              Today's Date: 2021       Admit Date: 2021    Visit Dx:     ICD-10-CM ICD-9-CM   1. Breast mass  N63.0 611.72   2. Decreased activities of daily living (ADL)  Z78.9 V49.89     Patient Active Problem List   Diagnosis   • Venous insufficiency (chronic) (peripheral)   • Type 2 diabetes mellitus with hyperglycemia (HCC)   • Pure hypercholesterolemia   • Essential (primary) hypertension   • Preoperative evaluation to rule out surgical contraindication   • Abnormal EKG   • Shortness of breath   • 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, estrogen receptor positive (HCC)   • Breast mass     Past Medical History:   Diagnosis Date   • Cataracts, bilateral    • Cellulitis of left lower limb    • Cellulitis of right lower limb    • COPD (chronic obstructive pulmonary disease) (HCC)    • DJD (degenerative joint disease), cervical     DJD C Spine/Spondylolysis, Cervical Region   • Essential (primary) hypertension    • GERD (gastroesophageal reflux disease)    • Hypothyroidism    • Major depressive disorder, recurrent, moderate ([facility]ly [facility])    • Migraine    • Morbid (severe) obesity due to excess calories ([facility])    • Obstructive sleep apnea    • Primary generalized (osteo)arthritis    • Pure hypercholesterolemia    • Tinea unguium    • Type 2 diabetes mellitus with hyperglycemia (HCC)    • Venous insufficiency (chronic) (peripheral)      Past Surgical History:   Procedure Laterality Date   • CATARACT EXTRACTION, BILATERAL     • CATARACT EXTRACTION, BILATERAL     • ESSURE TUBAL LIGATION     • HYSTERECTOMY     • MASTECTOMY W/ SENTINEL NODE BIOPSY Left 2021    Procedure: LEFT MASTECTOMY WITH SENTINEL LYMPH NODE BIOPSY WITH MAGTRACE;  Surgeon: Naye Tavarez MD;  Location: [facility];  Service: General;  Laterality: Left;   • VARICOSE VEIN SURGERY Right       General Information     Row Name 21 1334           OT Time and Intention    Document Type evaluation  -     Mode of Treatment occupational therapy  -     Row Name 12/01/21 1334          General Information    Patient Profile Reviewed yes  -MW     Prior Level of Function independent:; all household mobility; ADL's; cooking; cleaning  per pt report, she is confused and family is not present at time of eval to verify history  -MW     Existing Precautions/Restrictions fall; oxygen therapy device and L/min  no BP on L side  -MW     Barriers to Rehab medically complex  -     Row Name 12/01/21 1334          Occupational Profile    Reason for Services/Referral (Occupational Profile) s/p L mastectomy with lymph node biopsy 11/30  -     Environmental Supports and Barriers (Occupational Profile) tub shower but was mainly sponge bathing  -     Row Name 12/01/21 1334          Living Environment    Lives With alone  -     Row Name 12/01/21 1334          Home Main Entrance    Number of Stairs, Main Entrance --  ramp  -     Row Name 12/01/21 1334          Stairs Within Home, Primary    Number of Stairs, Within Home, Primary none  -     Row Name 12/01/21 1334          Cognition    Orientation Status (Cognition) oriented x 4  -MW     Row Name 12/01/21 1334          Safety Issues, Functional Mobility    Safety Issues Affecting Function (Mobility) insight into deficits/self-awareness; positioning of assistive device  -     Impairments Affecting Function (Mobility) balance; cognition; endurance/activity tolerance; strength; pain  -     Cognitive Impairments, Mobility Safety/Performance insight into deficits/self-awareness; judgment; problem-solving/reasoning  -           User Key  (r) = Recorded By, (t) = Taken By, (c) = Cosigned By    Initials Name Provider Type    MW Mercedes Alanis, OTR/L Occupational Therapist                 Mobility/ADL's     Row Name 12/01/21 1335          Bed Mobility    Comment (Bed Mobility) up in chair  -     Row Name  12/01/21 1334          Transfers    Transfers sit-stand transfer  -     Sit-Stand [place] (Transfers) contact guard  -     Row Name 12/01/21 1334          Sit-Stand Transfer    Assistive Device (Sit-Stand Transfers) walker, 4-wheeled  -     Row Name 12/01/21 1334          Functional Mobility    Functional Mobility- Ind. Level contact guard assist  -     Functional Mobility- Device rolling walker  -     Functional Mobility- Comment amb to BR and back to chair following toileting  -     Row Name 12/01/21 1334          Activities of Daily Living    BADL Assessment/Intervention toileting  -     Row Name 12/01/21 1334          Toileting Assessment/Training    [place] Level (Toileting) adjust/manage clothing; maximum assist (25% patient effort); perform perineal hygiene; contact guard assist  -     Assistive Devices (Toileting) commode; grab bar/safety frame  -     Position (Toileting) unsupported sitting; unsupported standing  -     Comment (Toileting) CGA for balance, decreased awareness of clothing management despite cues, S needed for safety while on commode  -           User Key  (r) = Recorded By, (t) = Taken By, (c) = Cosigned By    Initials Name Provider Type     Mercedes Alanis, OTR/L Occupational Therapist               Obj/Interventions     Row Name 12/01/21 1334          Range of Motion Comprehensive    General Range of Motion bilateral upper extremity ROM WFL  -     Row Name 12/01/21 1334          Strength Comprehensive (MMT)    Comment, General Manual Muscle Testing (MMT) Assessment BUE functionally 4/5  -MW     Row Name 12/01/21 1334          Balance    Balance Assessment sitting static balance; sitting dynamic balance; standing static balance; standing dynamic balance  -     Static Sitting Balance WFL  -MW     Dynamic Sitting Balance mild impairment; unsupported; sitting in chair  -MW     Static Standing Balance mild impairment; supported  -MW     Dynamic Standing  Balance mild impairment; supported  -           User Key  (r) = Recorded By, (t) = Taken By, (c) = Cosigned By    Initials Name Provider Type    MW Mercedes Alanis, OTR/L Occupational Therapist               Goals/Plan     Row Name 12/01/21 1517          Transfer Goal 1 (OT)    Activity/Assistive Device (Transfer Goal 1, OT) sit-to-stand/stand-to-sit; bed-to-chair/chair-to-bed; toilet; tub; walk-in shower  -MW     Sac Level/Cues Needed (Transfer Goal 1, OT) supervision required  -MW     Time Frame (Transfer Goal 1, OT) long term goal (LTG); 10 days  -MW     Progress/Outcome (Transfer Goal 1, OT) goal ongoing  -     Row Name 12/01/21 1517          Bathing Goal 1 (OT)    Activity/Device (Bathing Goal 1, OT) bathing skills, all; grab bar, tub/shower; shower chair  -MW     Sac Level/Cues Needed (Bathing Goal 1, OT) set-up required; minimum assist (75% or more patient effort)  -MW     Time Frame (Bathing Goal 1, OT) long term goal (LTG); 10 days  -MW     Progress/Outcomes (Bathing Goal 1, OT) goal ongoing  -     Row Name 12/01/21 1517          Dressing Goal 1 (OT)    Activity/Device (Dressing Goal 1, OT) lower body dressing  -MW     Sac/Cues Needed (Dressing Goal 1, OT) minimum assist (75% or more patient effort)  -MW     Time Frame (Dressing Goal 1, OT) long term goal (LTG); 10 days  -MW     Progress/Outcome (Dressing Goal 1, OT) goal ongoing  -     Row Name 12/01/21 1517          Therapy Assessment/Plan (OT)    Planned Therapy Interventions (OT) activity tolerance training; adaptive equipment training; BADL retraining; cognitive/visual perception retraining; functional balance retraining; IADL retraining; occupation/activity based interventions; patient/caregiver education/training; strengthening exercise; transfer/mobility retraining  -           User Key  (r) = Recorded By, (t) = Taken By, (c) = Cosigned By    Initials Name Provider Type    MW Mercedes Alanis, OTR/L Occupational  Therapist               Clinical Impression     Row Name 12/01/21 5687          Pain Assessment    Additional Documentation Pain Scale: FACES Pre/Post-Treatment (Group)  -     Row Name 12/01/21 8353          Pain Scale: FACES Pre/Post-Treatment    Pain: FACES Scale, Pretreatment 2-->hurts little bit  -MW     Posttreatment Pain Rating 2-->hurts little bit  -MW     Pre/Posttreatment Pain Comment L side and sores on neck and legs  -     Row Name 12/01/21 9493          Plan of Care Review    Plan of Care Reviewed With patient  -     Progress no change  -     Outcome Summary OT eval completed. Pt awake and alert sitting up in chair, c/o not liking the food but no complaints symptomatically. She is somewhat confused when speaking to her but is oriented x4. Reports she lives alone and was previously independent with ADL/IADL and fxl mobility. Upon entering room pt on RA, holding nc, satting 87%. When replaced nc pt increased to 92% after 2 min rest and pursed lip breathing cues. Amb to BR, retirement through insisted on taking off O2. SHe is able to perform toileting hygiene with SBA for balance, MaxA for clothing management 2' cognition and decreased awareness. Pt is mildly unsteady requiring CGA for safety, no true LOB and decreased gait speed with return to chair. Pt satting 86% upon return to chair, quickly returned to 94% when nc replaced, educated on need for continued wear. OT indicated to address strength, endurance/ activity tolerance, and balance to increase independence and decrease fall risk. Recommend d/c SNF.  -     Row Name 12/01/21 1883          Therapy Assessment/Plan (OT)    Patient/Family Therapy Goal Statement (OT) increase strength  -MW     Rehab Potential (OT) good, to achieve stated therapy goals  -     Criteria for Skilled Therapeutic Interventions Met (OT) yes; skilled treatment is necessary  -MW     Therapy Frequency (OT) 3 times/wk  -MW     Predicted Duration of Therapy Intervention (OT)  10 days  -MW     Row Name 12/01/21 1334          Therapy Plan Review/Discharge Plan (OT)    Anticipated Discharge Disposition (OT) skilled nursing facility  -     Row Name 12/01/21 1334          Vital Signs    Pre SpO2 (%) 87  -MW     O2 Delivery Pre Treatment room air  increased to 92% when O2 replaced  -MW     Intra SpO2 (%) 87  -MW     O2 Delivery Intra Treatment room air  -MW     Post SpO2 (%) 94  -MW     O2 Delivery Post Treatment nasal cannula  -     Row Name 12/01/21 1334          Positioning and Restraints    Pre-Treatment Position sitting in chair/recliner  -MW     Post Treatment Position chair  -MW     In Chair reclined; call light within reach; encouraged to call for assist; RUE elevated; LUE elevated; legs elevated  -MW           User Key  (r) = Recorded By, (t) = Taken By, (c) = Cosigned By    Initials Name Provider Type    Mercedes Phipps, OTR/L Occupational Therapist               Outcome Measures     Row Name 12/01/21 1518          How much help from another is currently needed...    Putting on and taking off regular lower body clothing? 2  -MW     Bathing (including washing, rinsing, and drying) 2  -MW     Toileting (which includes using toilet bed pan or urinal) 3  -MW     Putting on and taking off regular upper body clothing 3  -MW     Taking care of personal grooming (such as brushing teeth) 4  -MW     Eating meals 4  -MW     AM-PAC 6 Clicks Score (OT) 18  -MW     Row Name 12/01/21 1518          Functional Assessment    Outcome Measure Options AM-PAC 6 Clicks Daily Activity (OT)  -MW           User Key  (r) = Recorded By, (t) = Taken By, (c) = Cosigned By    Initials Name Provider Type    Mercedes Phipps, OTR/L Occupational Therapist                Occupational Therapy Education                 Title: PT OT SLP Therapies (In Progress)     Topic: Occupational Therapy (In Progress)     Point: ADL training (In Progress)     Description:   Instruct learner(s) on proper safety adaptation  and remediation techniques during self care or transfers.   Instruct in proper use of assistive devices.              Learning Progress Summary           Patient Acceptance, E,D, NR by  at 12/1/2021 1518                   Point: Home exercise program (In Progress)     Description:   Instruct learner(s) on appropriate technique for monitoring, assisting and/or progressing therapeutic exercises/activities.              Learning Progress Summary           Patient Acceptance, E,D, NR by  at 12/1/2021 1518                               User Key     Initials Effective Dates Name Provider Type Discipline     08/28/18 -  Mercedes Alanis, OTR/L Occupational Therapist OT              OT Recommendation and Plan  Planned Therapy Interventions (OT): activity tolerance training, adaptive equipment training, BADL retraining, cognitive/visual perception retraining, functional balance retraining, IADL retraining, occupation/activity based interventions, patient/caregiver education/training, strengthening exercise, transfer/mobility retraining  Therapy Frequency (OT): 3 times/wk  Plan of Care Review  Plan of Care Reviewed With: patient  Progress: no change  Outcome Summary: OT eval completed. Pt awake and alert sitting up in chair, c/o not liking the food but no complaints symptomatically. She is somewhat confused when speaking to her but is oriented x4. Reports she lives alone and was previously independent with ADL/IADL and fxl mobility. Upon entering room pt on RA, holding nc, satting 87%. When replaced nc pt increased to 92% after 2 min rest and pursed lip breathing cues. Amb to BR, snf through insisted on taking off O2. SHe is able to perform toileting hygiene with SBA for balance, MaxA for clothing management 2' cognition and decreased awareness. Pt is mildly unsteady requiring CGA for safety, no true LOB and decreased gait speed with return to chair. Pt satting 86% upon return to chair, quickly returned to 94% when  nc replaced, educated on need for continued wear. OT indicated to address strength, endurance/ activity tolerance, and balance to increase independence and decrease fall risk. Recommend d/c SNF.     Time Calculation:    Time Calculation- OT     Row Name 12/01/21 1518             Time Calculation- OT    OT Start Time 1334  -MW      OT Stop Time 1414  -MW      OT Time Calculation (min) 40 min  -MW      OT Received On 12/01/21  -MW      OT Goal Re-Cert Due Date 12/11/21  -MW            User Key  (r) = Recorded By, (t) = Taken By, (c) = Cosigned By    Initials Name Provider Type    MW Mercedes Alanis, OTR/L Occupational Therapist              Therapy Charges for Today     Code Description Service Date Service Provider Modifiers Qty    24630410231 HC OT EVAL LOW COMPLEXITY 3 12/1/2021 Mercedes Alanis, OTR/L GO 1               Mercedes Alanis OTR/L  12/1/2021

## 2021-12-01 NOTE — PROGRESS NOTES
Naye Tavarez MD - General Surgery  Progress Note     LOS: 0 days   Patient Care Team:  Pb Mitchell APRN as PCP - General (Family Medicine)  Pb Mitchell APRN as Referring Physician (Family Medicine)  Martin Vasquez DO as Cardiologist (Cardiology)  Naye Tavarez MD as Consulting Physician (General Surgery)      Subjective     Interval History:     Slightly more confused this AM. Pain controlled. Tolerating diet.     Objective     Vital Signs  Temp:  [97.4 °F (36.3 °C)-98.2 °F (36.8 °C)] 97.7 °F (36.5 °C)  Heart Rate:  [] 61  Resp:  [16-18] 18  BP: ()/() 127/61    Physical Exam:  General appearance - alert, somewhat confused   Mental status - confused  Eyes - sclera anicteric  Neck - supple, no significant adenopathy  Chest - no tachypnea, retractions or cyanosis  Heart - normal rate and regular rhythm  Abdomen - soft, nontender, nondistended, no masses or organomegaly  Breasts - left mastectomy incision without significant bruising. C/D/I. Drain in place with serosanguinous output   Neurological - screening mental status exam normal  Musculoskeletal - no joint tenderness, deformity or swelling      Results Review:    Lab Results (last 24 hours)     Procedure Component Value Units Date/Time    POC Glucose Once [[id number]]  (Normal) Collected: 12/01/21 0739    Specimen: Blood Updated: 12/01/21 0800     Glucose 118 mg/dL      Comment: : [id number] Jina WarrenMeter ID: [id number]       POC Glucose Once [[id number]]  (Abnormal) Collected: 11/30/21 1728    Specimen: Blood Updated: 11/30/21 1739     Glucose 140 mg/dL      Comment: : [id number] Monique DenzelonMeter ID: [id number]       Tissue Pathology Exam [[id number]] Collected: 11/30/21 0957    Specimen: Tissue from Breast, Left; Tissue from [place] Lymph Node; Tissue from Breast, Left; Tissue from Breast, Left Updated: 11/30/21 1425    POC Glucose Once [[id number]]  (Abnormal) Collected: 11/30/21 1219    Specimen:  Blood Updated: 11/30/21 1230     Glucose 159 mg/dL      Comment: : [id number] Elpidio Bradley ID: [id number]           Imaging Results (Last 24 Hours)     ** No results found for the last 24 hours. **            Assessment/Plan       Ms. Dominguez is POD 1 s/p left mastectomy with sentinel lymph node biopsy for [facility] ER +.     - Regular diet, HLIV   - Prn pain and nausea control   - I had a discussion with daughter this am that I think the Ms. Dominguez would benefit from SNF placement. She is an agreement to consider this. PT, OT and case management consulted.   - Drain teaching   - Patient medically ready for discharge from surgical standpoint. Pending PT and OT evaluation and possible SNF placement.     Hypothyroidism:   - continue home euthyrox     HTN:   - continue home Peteyzidjuliana Tavarez MD  12/01/21  09:45 CST

## 2021-12-02 NOTE — PLAN OF CARE
Goal Outcome Evaluation:  Plan of Care Reviewed With: patient, daughter        Progress: no change  Outcome Summary: PT eval complete. Pt found sitting upright in chair, on 4.5L O2 via nc, AOx4, with c/o 7/10 pain in BLEs. Pt reports living alone and being primarily independent, with minimal assistance from daughter to bathe and grocery shop, prior to hospitalization. Did not observe bed mobility due to pt up in chair upon arrival. BLE sensation intact, ROM WFL, and mild strength deficits identified for B hips/knees. Performed sit <> stand transfer CGA with rollator and gait CGA with rollator. Pt slightly unsteady when ambulating in room due to generalized weakness and fatigue, however able to maintain balance during transfers and gait. Ambulated 40 feet from chair to commode to chair taking 1 seated rest break on commode. Presented with decreased gait speed, wide GALDINO, fwd head/rounded shoulders, and decreased heel strike. PT indicated for pt to address functional limitations, balance, endurance, activity tolerance, and decrease risk of falling. Recommended d/c SNF at this time.

## 2021-12-02 NOTE — THERAPY EVALUATION
Patient Name: Alissa Dominguez  : 1945    MRN: [id number]                              Today's Date: 2021       Admit Date: 2021    Visit Dx:     ICD-10-CM ICD-9-CM   1. Breast mass  N63.0 611.72   2. Decreased activities of daily living (ADL)  Z78.9 V49.89   3. Impaired mobility  Z74.09 799.89     Patient Active Problem List   Diagnosis   • Venous insufficiency (chronic) (peripheral)   • Type 2 diabetes mellitus with hyperglycemia (HCC)   • Pure hypercholesterolemia   • Essential (primary) hypertension   • Preoperative evaluation to rule out surgical contraindication   • Abnormal EKG   • Shortness of breath   • 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, estrogen receptor positive (HCC)   • Breast mass     Past Medical History:   Diagnosis Date   • Cataracts, bilateral    • Cellulitis of left lower limb    • Cellulitis of right lower limb    • COPD (chronic obstructive pulmonary disease) (HCC)    • DJD (degenerative joint disease), cervical     DJD C Spine/Spondylolysis, Cervical Region   • Essential (primary) hypertension    • GERD (gastroesophageal reflux disease)    • Hypothyroidism    • Major depressive disorder, recurrent, moderate ([facility])    • Migraine    • Morbid (severe) obesity due to excess calories ([facility])    • Obstructive sleep apnea    • Primary generalized (osteo)arthritis    • Pure hypercholesterolemia    • Tinea unguium    • Type 2 diabetes mellitus with hyperglycemia (HCC)    • Venous insufficiency (chronic) (peripheral)      Past Surgical History:   Procedure Laterality Date   • CATARACT EXTRACTION, BILATERAL     • CATARACT EXTRACTION, BILATERAL     • ESSURE TUBAL LIGATION     • HYSTERECTOMY     • MASTECTOMY W/ SENTINEL NODE BIOPSY Left 2021    Procedure: LEFT MASTECTOMY WITH SENTINEL LYMPH NODE BIOPSY WITH MAGTRACE;  Surgeon: Naye Tavarez MD;  Location: [facility];  Service: General;  Laterality: Left;   • VARICOSE VEIN SURGERY Right       General  Information     [facility] Name 12/02/21 1043          Physical Therapy Time and Intention    Document Type evaluation  S/p L mastectomy with lymph node biopsy 11/30/21.  -SB (r) BH (t) SB (c)     Mode of Treatment physical therapy  -SB (r) BH (t) SB (c)     [facility] Name 12/02/21 1043          General Information    Patient Profile Reviewed yes  -SB (r) BH (t) SB (c)     Prior Level of Function independent:; all household mobility; gait; transfer; ADL's; dressing; cooking; cleaning; min assist:; bathing  step-over tub, rollator, oxygen tank, raised commode; reports taking sponge baths  -SB (r) BH (t) SB (c)     Existing Precautions/Restrictions fall; oxygen therapy device and L/min  4.5L O2  -SB (r) BH (t) SB (c)     Barriers to Rehab medically complex  -SB (r) BH (t) SB (c)     Row Name 12/02/21 1043          Living Environment    Lives With alone  -SB (r) BH (t) SB (c)     Row Name 12/02/21 1043          Home Main Entrance    Number of Stairs, Main Entrance other (see comments)  ramp  -SB (r) BH (t) SB (c)     Row Name 12/02/21 1043          Stairs Within Home, Primary    Number of Stairs, Within Home, Primary none  -SB (r) BH (t) SB (c)     Stair Railings, Within Home, Primary none  -SB (r) BH (t) SB (c)     [facility] Name 12/02/21 1043          Cognition    Orientation Status (Cognition) oriented x 4  -SB (r) BH (t) SB (c)     Row Name 12/02/21 1043          Safety Issues, Functional Mobility    Safety Issues Affecting Function (Mobility) insight into deficits/self-awareness; safety precaution awareness  -SB (r) BH (t) SB (c)     Impairments Affecting Function (Mobility) balance; endurance/activity tolerance; strength; pain; cognition; shortness of breath  -SB (r) BH (t) SB (c)     Cognitive Impairments, Mobility Safety/Performance insight into deficits/self-awareness; judgment; safety precaution awareness  -SB (r) BH (t) SB (c)           User Key  (r) = Recorded By, (t) = Taken By, (c) = Cosigned By    Initials Name Provider  Type    SB Shannon Wetsbrook, PT DPT Physical Therapist    Brooke Huizar, PT Student PT Student               Mobility     Row Name 12/02/21 1043          Bed Mobility    Comment (Bed Mobility) Up in chair upon arrival.  -SB (r) BH (t) SB (c)     Row Name 12/02/21 1043          Transfers    Comment (Transfers) Performed 2 sit <> stand transfers, chair and commode, pt able to fully stand and bring trunk upright without manual assistance req.  -SB (r) BH (t) SB (c)     Row Name 12/02/21 1043          Sit-Stand Transfer    Sit-Stand [place] (Transfers) contact guard  -SB (r) BH (t) SB (c)     Assistive Device (Sit-Stand Transfers) walker, 4-wheeled  -SB (r) BH (t) SB (c)     Row Name 12/02/21 1043          Gait/Stairs (Locomotion)    [place] Level (Gait) contact guard; verbal cues  -SB (r) BH (t) SB (c)     Assistive Device (Gait) walker, 4-wheeled  -SB (r) BH (t) SB (c)     Distance in Feet (Gait) 40 feet  -SB (r) BH (t) SB (c)     Deviations/Abnormal Patterns (Gait) base of support, wide; gait speed decreased; stride length decreased  -SB (r) BH (t) SB (c)     Bilateral Gait Deviations forward flexed posture; heel strike decreased  -SB (r) BH (t) SB (c)           User Key  (r) = Recorded By, (t) = Taken By, (c) = Cosigned By    Initials Name Provider Type    Shannon Delacruz, PT DPT Physical Therapist    Brooke Huizar, PT Student PT Student               Obj/Interventions     Row Name 12/02/21 1043          Range of Motion Comprehensive    General Range of Motion bilateral lower extremity ROM WFL  -SB (r) BH (t) SB (c)     Comment, General Range of Motion BLE ROM WFL  -SB (r) BH (t) SB (c)     Row Name 12/02/21 1043          Strength Comprehensive (MMT)    General Manual Muscle Testing (MMT) Assessment lower extremity strength deficits identified  -SB (r) BH (t) SB (c)     Comment, General Manual Muscle Testing (MMT) Assessment BLE MMT: 4/5 hip flex, 4/5 knee ext, 4+/5 knee flex, 5/5 ankle DF,  5/5 ankle PF  -SB (r) BH (t) SB (c)     Row Name 12/02/21 1043          Motor Skills    Motor Skills coordination  -SB (r) BH (t) SB (c)     Coordination WFL; bilateral; lower extremity; heel to shin  -SB (r) BH (t) SB (c)     Row Name 12/02/21 1043          Balance    Balance Assessment sitting static balance; sitting dynamic balance; standing static balance; standing dynamic balance  -SB (r) BH (t) SB (c)     Static Sitting Balance WFL; unsupported; sitting in chair  -SB (r) BH (t) SB (c)     Dynamic Sitting Balance WFL; unsupported; sitting in chair  -SB (r) BH (t) SB (c)     Static Standing Balance mild impairment; supported; standing  -SB (r) BH (t) SB (c)     Dynamic Standing Balance mild impairment; supported; standing  -SB (r) BH (t) SB (c)     Comment, Balance Pt slightly unsteady when ambulating in room due to generalized weakness and fatigue, however no LOB demonstrated.  -SB (r) BH (t) SB (c)     Row Name 12/02/21 1043          Sensory Assessment (Somatosensory)    Sensory Assessment (Somatosensory) LE sensation intact  -SB (r) BH (t) SB (c)           User Key  (r) = Recorded By, (t) = Taken By, (c) = Cosigned By    Initials Name Provider Type    Shannon Delacruz, PT DPT Physical Therapist    Brooke Huizar, PT Student PT Student               Goals/Plan     Row Name 12/02/21 1043          Bed Mobility Goal 1 (PT)    Activity/Assistive Device (Bed Mobility Goal 1, PT) rolling to left; rolling to right; scooting; sit to supine; supine to sit  -SB (r) BH (t) SB (c)     [place] Level/Cues Needed (Bed Mobility Goal 1, PT) modified independence  -SB (r) BH (t) SB (c)     Time Frame (Bed Mobility Goal 1, PT) long term goal (LTG); 10 days  -SB (r) BH (t) SB (c)     Progress/Outcomes (Bed Mobility Goal 1, PT) goal ongoing  -SB (r) BH (t) SB (c)     Row Name 12/02/21 1043          Transfer Goal 1 (PT)    Activity/Assistive Device (Transfer Goal 1, PT) sit-to-stand/stand-to-sit;  bed-to-chair/chair-to-bed; walker, rolling  -SB (r) BH (t) SB (c)     [place] Level/Cues Needed (Transfer Goal 1, PT) standby assist  -SB (r) BH (t) SB (c)     Time Frame (Transfer Goal 1, PT) long term goal (LTG); 10 days  -SB (r) BH (t) SB (c)     Progress/Outcome (Transfer Goal 1, PT) goal ongoing  -SB (r) BH (t) SB (c)     Row Name 12/02/21 1043          Gait Training Goal 1 (PT)    Activity/Assistive Device (Gait Training Goal 1, PT) gait (walking locomotion); assistive device use; decrease fall risk; diminish gait deviation; improve balance and speed; increase energy conservation; walker, rolling  -SB (r) BH (t) SB (c)     [place] Level (Gait Training Goal 1, PT) standby assist  -SB (r) BH (t) SB (c)     Distance (Gait Training Goal 1, PT) 90 ft x 2  -SB (r) BH (t) SB (c)     Time Frame (Gait Training Goal 1, PT) long term goal (LTG); 10 days  -SB (r) BH (t) SB (c)     Progress/Outcome (Gait Training Goal 1, PT) goal ongoing  -SB (r) BH (t) SB (c)     Row Name 12/02/21 1043          Problem Specific Goal 1 (PT)    Problem Specific Goal 1 (PT) Maintain standing balance during all transfers and gait training demonstrating no LOB.  -SB (r) BH (t) SB (c)     Time Frame (Problem Specific Goal 1, PT) long-term goal (LTG)  -SB (r) BH (t) SB (c)     Progress/Outcome (Problem Specific Goal 1, PT) goal ongoing  -SB (r) BH (t) SB (c)     Row Name 12/02/21 1043          Patient Education Goal (PT)    Activity (Patient Education Goal, PT) Demonstrate safety techniques with use of AD during future treatment sessions.  -SB (r) BH (t) SB (c)     [place]/Cues/Accuracy (Memory Goal 2, PT) demonstrates adequately; independent  -SB (r) BH (t) SB (c)     Time Frame (Patient Education Goal, PT) long term goal (LTG); 10 days  -SB (r) BH (t) SB (c)     Progress/Outcome (Patient Education Goal, PT) goal ongoing  -SB (r) BH (t) SB (c)           User Key  (r) = Recorded By, (t) = Taken By, (c) = Cosigned By    Initials  Name Provider Type    SB Shannon [place], PT DPT Physical Therapist    Brooke Huizar, PT Student PT Student               Clinical Impression     Row Name 12/02/21 1043          Pain    Additional Documentation Pain Scale: Numbers Pre/Post-Treatment (Group)  -SB (r) BH (t) SB (c)     Row Name 12/02/21 1043          Pain Scale: Numbers Pre/Post-Treatment    Pretreatment Pain Rating 7/10  -SB (r) BH (t) SB (c)     Posttreatment Pain Rating 7/10  -SB (r) BH (t) SB (c)     Pain Location - Side Bilateral  -SB (r) BH (t) SB (c)     Pain Location - Orientation lower  -SB (r) BH (t) SB (c)     Pain Location extremity  -SB (r) BH (t) SB (c)     Pain Intervention(s) Repositioned; Ambulation/increased activity  -SB (r) BH (t) SB (c)     Row Name 12/02/21 1043          Plan of Care Review    Plan of Care Reviewed With patient; daughter  -SB (r) BH (t) SB (c)     Progress no change  -SB (r) BH (t) SB (c)     Outcome Summary PT eval complete. Pt found sitting upright in chair, on 4.5L O2 via nc, AOx4, with c/o 7/10 pain in BLEs. Pt reports living alone and being primarily independent, with minimal assistance from daughter to bathe and grocery shop, prior to hospitalization. Did not observe bed mobility due to pt up in chair upon arrival. BLE sensation intact, ROM WFL, and mild strength deficits identified for B hips/knees. Performed sit <> stand transfer CGA with rollator and gait CGA with rollator. Pt slightly unsteady when ambulating in room due to generalized weakness and fatigue, however able to maintain balance during transfers and gait. Ambulated 40 feet from chair to commode to chair taking 1 seated rest break on commode. Presented with decreased gait speed, wide GALDINO, fwd head/rounded shoulders, and decreased heel strike. PT indicated for pt to address functional limitations, balance, endurance, activity tolerance, and decrease risk of falling. Recommended d/c SNF at this time.  -SB (r) BH (t) SB (c)     Row Name  12/02/21 1043          Therapy Assessment/Plan (PT)    Patient/Family Therapy Goals Statement (PT) Improve functional mobility.  -SB (r) BH (t) SB (c)     Rehab Potential (PT) good, to achieve stated therapy goals  -SB (r) BH (t) SB (c)     Criteria for Skilled Interventions Met (PT) yes; meets criteria; skilled treatment is necessary  -SB (r) BH (t) SB (c)     Predicted Duration of Therapy Intervention (PT) Until d/c or goals are met.  -SB (r) BH (t) SB (c)     Row Name 12/02/21 1043          Vital Signs    O2 Delivery Pre Treatment nasal cannula  4.5L O2  -SB (r) BH (t) SB (c)     O2 Delivery Intra Treatment nasal cannula  4.5L O2  -SB (r) BH (t) SB (c)     O2 Delivery Post Treatment nasal cannula  4.5L O2  -SB (r) BH (t) SB (c)     Pre Patient Position Sitting  -SB (r) BH (t) SB (c)     Intra Patient Position Standing  -SB (r) BH (t) SB (c)     Post Patient Position Sitting  -SB (r) BH (t) SB (c)     Rest Breaks  1  -SB (r) BH (t) SB (c)     Row Name 12/02/21 1043          Positioning and Restraints    Pre-Treatment Position sitting in chair/recliner  -SB (r) BH (t) SB (c)     Post Treatment Position chair  -SB (r) BH (t) SB (c)     In Chair sitting; notified nsg; call light within reach; encouraged to call for assist; exit alarm on; with family/caregiver; legs elevated; RUE elevated; LUE elevated  -SB (r) BH (t) SB (c)           User Key  (r) = Recorded By, (t) = Taken By, (c) = Cosigned By    Initials Name Provider Type    Shannon Delacruz, PT DPT Physical Therapist    Brooke Huizar, PT Student PT Student               Outcome Measures     Row Name 12/02/21 1043          How much help from another person do you currently need...    Turning from your back to your side while in flat bed without using bedrails? 4  -SB (r) BH (t) SB (c)     Moving from lying on back to sitting on the side of a flat bed without bedrails? 3  -SB (r) BH (t) SB (c)     Moving to and from a bed to a chair (including a  wheelchair)? 3  -SB (r) BH (t) SB (c)     Standing up from a chair using your arms (e.g., wheelchair, bedside chair)? 3  -SB (r) BH (t) SB (c)     Climbing 3-5 steps with a railing? 2  -SB (r) BH (t) SB (c)     To walk in hospital room? 3  -SB (r) BH (t) SB (c)     AM-PAC 6 Clicks Score (PT) 18  -SB (r) BH (t)     Row Name 12/02/21 1043          Functional Assessment    Outcome Measure Options AM-PAC 6 Clicks Basic Mobility (PT)  -SB (r) BH (t) SB (c)           User Key  (r) = Recorded By, (t) = Taken By, (c) = Cosigned By    Initials Name Provider Type    Shannon Delacruz, PT DPT Physical Therapist     Brooke Natarajan, PT Student PT Student                             Physical Therapy Education                 Title: PT OT SLP Therapies (In Progress)     Topic: Physical Therapy (In Progress)     Point: Mobility training (Done)     Learning Progress Summary           Patient Acceptance, E, VU by  at 12/2/2021 1232    Comment: Educated pt on proper body mechanics during transfers, benefits of OOB activity, POC, and d/c.                   Point: Home exercise program (Not Started)     Learner Progress:  Not documented in this visit.          Point: Body mechanics (Done)     Learning Progress Summary           Patient Acceptance, E, VU by  at 12/2/2021 1232    Comment: Educated pt on proper body mechanics during transfers, benefits of OOB activity, POC, and d/c.                   Point: Precautions (Not Started)     Learner Progress:  Not documented in this visit.                      User Key     Initials Effective Dates Name Provider Type Discipline     09/07/21 -  Brooke Natarajan, PT Student PT Student PT              PT Recommendation and Plan  Planned Therapy Interventions (PT): balance training, bed mobility training, gait training, strengthening, postural re-education, patient/family education, transfer training, other (see comments) (safety techniques)  Plan of Care Reviewed With: patient,  daughter  Progress: no change  Outcome Summary: PT eval complete. Pt found sitting upright in chair, on 4.5L O2 via nc, AOx4, with c/o 7/10 pain in BLEs. Pt reports living alone and being primarily independent, with minimal assistance from daughter to bathe and grocery shop, prior to hospitalization. Did not observe bed mobility due to pt up in chair upon arrival. BLE sensation intact, ROM WFL, and mild strength deficits identified for B hips/knees. Performed sit <> stand transfer CGA with rollator and gait CGA with rollator. Pt slightly unsteady when ambulating in room due to generalized weakness and fatigue, however able to maintain balance during transfers and gait. Ambulated 40 feet from chair to commode to chair taking 1 seated rest break on commode. Presented with decreased gait speed, wide GAL[place]INO, fwd head/rounded shoulders, and decreased heel strike. PT indicated for pt to address functional limitations, balance, endurance, activity tolerance, and decrease risk of falling. Recommended d/c SNF at this time.     Time Calculation:    PT Charges     Row Name 12/02/21 1043             Time Calculation    Start Time 1130  -SB (r) BH (t) SB (c)      Stop Time 1208  +6 min for chart review  -SB (r) BH (t) SB (c)      Time Calculation (min) 38 min  -SB (r) BH (t)      PT Received On 12/02/21  -SB (r) BH (t) SB (c)      PT Goal Re-Cert Due Date 12/12/21  -SB (r) BH (t) SB (c)            User Key  (r) = Recorded By, (t) = Taken By, (c) = Cosigned By    Initials Name Provider Type    Shannon Delacruz, PT DPT Physical Therapist    Brooke Huizar, PT Student PT Student                  PT G-Codes  Outcome Measure Options: AM-PAC 6 Clicks Basic Mobility (PT)  AM-PAC 6 Clicks Score (PT): 18  AM-PAC 6 Clicks Score (OT): 18    BROOKE YA PT Student  12/2/2021

## 2021-12-02 NOTE — PROGRESS NOTES
Naye Tavarez MD - General Surgery  Progress Note     LOS: 0 days   Patient Care Team:  Pb Mitchell APRN as PCP - General (Family Medicine)  Pb Mitchell APRN as Referring Physician (Family Medicine)  Martin Vasquez DO as Cardiologist (Cardiology)  Naye Tavarez MD as Consulting Physician (General Surgery)      Subjective     Interval History:     Pain controlled. Tolerating diet. Awaiting precert.     Objective     Vital Signs  Temp:  [97.7 °F (36.5 °C)-98.4 °F (36.9 °C)] 97.9 °F (36.6 °C)  Heart Rate:  [65-90] 89  Resp:  [16-18] 16  BP: (109-132)/(68-86) 109/70    Physical Exam:  General appearance - alert, somewhat confused   Mental status - confused  Eyes - sclera anicteric  Neck - supple, no significant adenopathy  Chest - no tachypnea, retractions or cyanosis  Heart - normal rate and regular rhythm  Abdomen - soft, nontender, nondistended, no masses or organomegaly  Breasts - left mastectomy incision without significant bruising. C/D/I. Drain in place with serosanguinous output   Neurological - screening mental status exam normal  Musculoskeletal - no joint tenderness, deformity or swelling      Results Review:    Lab Results (last 24 hours)     Procedure Component Value Units Date/Time    POC Glucose Once [[id number]]  (Abnormal) Collected: 12/02/21 0857    Specimen: Blood Updated: 12/02/21 0908     Glucose 143 mg/dL      Comment: : [id number] Beryl DsahaMeter ID: [id number]       Tissue Pathology Exam [[id number]] Collected: 11/30/21 0957    Specimen: Tissue from Breast, Left; Tissue from [place] Lymph Node; Tissue from Breast, Left; Tissue from Breast, Left Updated: 12/02/21 0747     Case Report --     Surgical Pathology Report                         Case: [id number]                                  Authorizing Provider:  Naye Tavarez MD    Collected:           11/30/2021 09:57 AM          Ordering Location:     [facility] OR  Received:             11/30/2021 02:25 PM          Pathologist:           Ha Workman MD                                                      Specimens:   1) - Breast, Left, left mastectomy, short stitch superior, long lateral                             2) - [place] Lymph Node, left breast sentinel lymph node blue and hot                              3) - Breast, Left, new inferior margin, stitch marks new margin                                     4) - Breast, Left, New skin margin, short superior, long lateral                            Final Diagnosis --     1.  Left breast, mastectomy:  Invasive lobular carcinoma.  Histologic grade ([place] histologic score)  Glandular (acinar)/tubular differentiation: Score 3.  Nuclear pleomorphism: Score 2.  Mitotic rate: Score 3.  Overall grade: Grade 3.  Maximum tumor diameter is 3.8 cm.  The nipple, skin, and surgical margins appear free of tumor.  Please see CAP synoptic.    2.  Left breast sentinel node, excision:  1 benign sentinel lymph node.  Immunohistochemical stains for pankeratin and estrogen are negative for metastatic carcinoma.      3.  New inferior margin, left breast, excision:  The surgical margins are free of tumor.    4.  New skin margin, left breast, excision:  Surgical margins are free of tumor.         Synoptic Checklist --     INVASIVE CARCINOMA OF THE BREAST: Resection  INVASIVE CARCINOMA OF THE BREAST: COMPLETE EXCISION - All Specimens  8th Edition - Protocol posted: 6/30/2021    SPECIMEN     Procedure:    Total mastectomy      Specimen Laterality:    Left     TUMOR     Tumor Site:    Upper outer quadrant      Histologic Type:    Invasive lobular carcinoma      Histologic Grade (Patrice Histologic Score):           Glandular (Acinar) / Tubular Differentiation:    Score 3        Nuclear Pleomorphism:    Score 2        Mitotic Rate:    Score 3        Overall Grade:    Grade 3 (scores of 8 or 9)      Tumor Size:    Greatest dimension of largest invasive focus  (Millimeters): 38 mm     Tumor Focality:    Single focus of invasive carcinoma      Ductal Carcinoma In Situ (DCIS):    Not identified      Lobular Carcinoma In Situ (LCIS):    Not identified      Tumor Extent:         Lymphovascular Invasion:    Not identified      Dermal Lymphovascular Invasion:    Not identified      Microcalcifications:    Not identified      Treatment Effect in the Breast:    No known presurgical therapy     MARGINS     Margin Status for Invasive Carcinoma:    All margins negative for invasive carcinoma        Distance from Invasive Carcinoma to Closest Margin:    8 mm       Closest Margin(s) to Invasive Carcinoma:    Posterior     REGIONAL LYMPH NODES     Regional Lymph Node Status:           :    All regional lymph nodes negative for tumor        Total Number of Lymph Nodes Examined (sentinel and non-sentinel):    1        Number of Romeo Nodes Examined:    1     DISTANT METASTASIS    PATHOLOGIC STAGE CLASSIFICATION (pTNM, AJCC 8th Edition)     Reporting of pT, pN, and (when applicable) pM categories is based on information available to the pathologist at the time the report is issued. As per the AJCC (Chapter 1, 8th Ed.) it is the managing physician’s responsibility to establish the final pathologic stage based upon all pertinent information, including but potentially not limited to this pathology report.     pT Category:    pT2      Regional Lymph Nodes Modifier:    (sn): Romeo node(s) evaluated.      pN Category:    pN0        Breast Biomarker Testing Performed on Previous Biopsy:           Estrogen Receptor (ER) Status:    Positive (greater than 10% of cells demonstrate nuclear positivity)          Percentage of Cells with Nuclear Positivity:    %      Breast Biomarker Testing Performed on Previous Biopsy:           Progesterone Receptor (PgR) Status:    Positive          Percentage of Cells with Nuclear Positivity:    6 %     Breast Biomarker Testing Performed on Previous  "Biopsy:           HER2 (by immunohistochemistry):    Negative (Score 0)      Breast Biomarker Testing Performed on Previous Biopsy:           HER2 (by in situ hybridization):    Cannot be determined (indeterminate)        Testing Performed on Case Number:    [id number]        Gross Description --        Specimen #1 is received in a formalin filled container labeled with the patient's name, date of birth, and \"left breast\". The specimen has a cold ischemic time of less than 25 minutes in a formalin fixation time of 11-1/2 hours. The specimen consists of a left breast oriented with a short suture designating the superior aspect and a long suture designating the lateral aspect per surgeon note. The breast measures 22.5 cm medial to lateral by 21.5 cm superior to inferior by 3.4 cm anterior to posterior and weighs 1141 g. The attached skin ellipse measures 22.5 cm medial to lateral by 14.9 cm superior to inferior. The skin surface is unremarkable. The nipple is unremarkable. The deep margin is yellow-pink with superficial disruptions but appears mostly intact and unremarkable. The posterior margin is inked black and the lateral margin is inked blue. Sectioning reveals a fairly well-circumscribed red-gray mass in the 2-3 o'clock aspect measuring 3.8 cm superior to inferior by 2.5 cm medial to lateral by 1.6 cm anterior to posterior. The mass measures 0.8 cm from the posterior margin, 1.4 cm from the skin, 1.9 cm from the lateral margin, 16.2 cm from the medial margin, 4.9 cm from the superior margin, and 8.5 cm from the inferior margin. The mass measures 6.5 cm from the nipple. A biopsy cavity is identified in the superior edge of the mass. The biopsy cavity has a gelatinous plug with biopsy clip. Sectioning through the remaining breast reveals yellow-pink, soft and lobulated fibroadipose tissue with no additional lesions identified.  1A-tip of nipple, shaved and cross-sectioned (inked does not represent true " "margin)  1B-base of nipple, shaved  1C-representative section of unremarkable skin  1D-perpendicular sections of deep margin and lateral margin adjacent to mass  1E through 1H-representative sections of the mass  1I-random upper outer quadrant  1J-random lower outer quadrant  1K-random upper inner quadrant  1L-random lower inner quadrant       Specimen #2 is received in a formalin filled container labeled with the patient's name, date of birth, and \"left breast sentinel lymph node blue and hot\". The specimen consists of a fragment of yellow-pink soft tissue measuring 2.5 x 1.4 x 0.5 cm. Dissection reveals a lymph node candidate measuring 1.8 cm. The cut surface is red-brown with blue dye.  2A and 2B-sentinel lymph node candidate       Specimen #3 is received in a formalin filled container labeled with the patient's name, date of birth, and \"new inferior margin left breast\". The specimen consists of a fragment of fibroadipose tissue measuring 8.5 x 4.4 cm and measuring 1.5 cm in thickness. The external surface is yellow-pink and lobulated. A suture on one side designates the new inferior margin per surgeon note. The new inferior margin is inked black and the previous margin is inked blue. The cut surface reveals unremarkable yellow-pink, soft and lobulated fibroadipose tissue. No areas of firmness or discoloration are identified.  3A through 3D-representative sections of additional inferior margin       Specimen #4 is received in a formalin filled container labeled with the patient's name, date of birth, and \"new skin margin\". The specimen consists of an ovoid segment of skin measuring 21.8 x 3.3 cm and averaging 0.5 cm in depth. A central surgical defect, closed with silver-colored surgical staples, is present measuring 18.5 cm in length. The skin surface is unremarkable. A short suture on one side designates the superior aspect, a long suture and one tip designates the lateral aspect per surgeon note. The superior " margin is inked blue, the inferior margin is inked black, and the lateral aspect is inked orange. The cut surface reveals unremarkable yellow-pink, soft and lobulated fibroadipose tissue.  4A through 4D-representative sections of additional skin margin from medial to lateral       Microscopic Description --     1.  The skin, nipple, and surgical margins appear free of tumor.  The mass consists of loosely cohesive cells with moderate nuclear pleomorphism, increased N/C ratios, vesicular chromatin, and inconspicuous to multiple nucleoli.  Tubular glandular formation is completely absent.  There is greater than 21 mitoses per millimeter squared.    2.  Review of multiple levels of the sentinel lymph node show no macroscopic evidence of metastatic disease.  Immunohistochemical stains for pankeratin and estrogen are negative.  The controls are good.  These are monoclonal antibody stains, polyclonal antibody stains are not utilized.    3.  Sections show adipose tissue and fibroconnective tissue with benign ducts and terminal lobular units.  No malignancy is identified.  4.  Sections show pieces of skin and underlying soft tissue.  No malignancy is identified.      POC Glucose Once [[id number]]  (Abnormal) Collected: 12/01/21 1721    Specimen: Blood Updated: 12/01/21 1732     Glucose 153 mg/dL      Comment: : [id number] Jina AnnMeter ID: [id number]           Imaging Results (Last 24 Hours)     ** No results found for the last 24 hours. **            Assessment/Plan       Ms. Dominguez is POD 2 s/p left mastectomy with sentinel lymph node biopsy for IDC ER +.     - Regular diet, HLIV   - Prn pain and nausea control   - I had a discussion with daughter  that I think the Ms. Dominguez would benefit from SNF placement. She is an agreement to consider this. PT, OT and case management consulted. Patient accepted at [facility]. Pre-cert pending   - Drain teaching   - Patient medically ready for discharge from surgical  standpoint. Pending SNF placement.     Hypothyroidism:   - continue home euthyrox     HTN:   - continue home Alise Tavarez MD  12/02/21  12:34 CST

## 2021-12-02 NOTE — CASE MANAGEMENT/SOCIAL WORK
Continued Stay Note   [place]     Patient Name: Alissa Dominguez  MRN: [id number]  Today's Date: 12/2/2021    Admit Date: 11/30/2021     Discharge Plan     Row Name 12/02/21 1058       Plan    Plan [facility] pending precert    Patient/Family in Agreement with Plan yes    Plan Comments [facility] does not have an available bed for unvaccinated people right now. [facility] has offered. Spoke with pt's daughter, Kassidy [phone], and she has accepted. PT in the room now so [facility] will start precert when evaluation complete.               Discharge Codes    No documentation.                     JERMAINE Del Castillo

## 2021-12-02 NOTE — PLAN OF CARE
Goal Outcome Evaluation:  Plan of Care Reviewed With: patient        Progress: improving  Outcome Summary: Pt has slept in the chair all night, she does this at home as well. She has not c/o pain. Drsg with gauze/tape and ace bandage. AILYN with minimal serosang drainage. Up with one and a walker to the bathroom. VSS. Safety maintained.

## 2021-12-03 NOTE — DISCHARGE SUMMARY
Consults     No orders found from 11/1/2021 to 12/1/2021.       Naye Tavarez MD - Discharge Summary    Date of Discharge:  12/3/2021    Discharge Diagnosis: breast cancer     Presenting Problem/History of Present Illness  Breast mass [N63.0]     Hospital Course  Patient is a 75 y.o. female presented with a 2.2 x 2.3 x 1.5cm ER + left breast cancer. Axillary US with no concerning lymph nodes. She underwent a left mastectomy with sentinel lymph node biopsy. Post operatively her diet was advanced and her pain was controlled with oral pain medication. PT and OT saw her and recommended SNF for rehab. Family and patient amenable. Drain removed on day of discharge. Discharge to facility with plan to follow up with me in 2 weeks.     Procedures Performed  Procedure(s):  LEFT MASTECTOMY WITH SENTINEL LYMPH NODE BIOPSY WITH MAGTRACE       Consults:   Consults     No orders found from 11/1/2021 to 12/1/2021.          Condition on Discharge:  Improved     Vital Signs  Temp:  [97.5 °F (36.4 °C)-98.2 °F (36.8 °C)] 97.9 °F (36.6 °C)  Heart Rate:  [52-93] 77  Resp:  [16-18] 18  BP: (109-134)/(63-87) 118/77    Physical Exam:   See History and Physical found in chart.    Discharge Disposition  Skilled Nursing Facility (DC - External)    Discharge Medications     Discharge Medications      New Medications      Instructions Start Date   acetaminophen 325 MG tablet  Commonly known as: Tylenol   975 mg, Oral, Every 8 Hours, Take every 8 hours for 3 days then take prn as needed.      cephalexin 500 MG capsule  Commonly known as: KEFLEX   500 mg, Oral, 2 Times Daily      cyclobenzaprine 5 MG tablet  Commonly known as: FLEXERIL   5 mg, Oral, Every 8 Hours PRN      ondansetron 4 MG tablet  Commonly known as: Zofran   4 mg, Oral, Every 8 Hours PRN      oxyCODONE 5 MG immediate release tablet  Commonly known as: Roxicodone   5 mg, Oral, Every 8 Hours PRN         Continue These Medications      Instructions Start Date   Calcium 600+D3  600-200 MG-UNIT tablet  Generic drug: Calcium Carb-Cholecalciferol   Oral      docusate sodium 100 MG capsule  Commonly known as: COLACE   100 mg, Oral, 2 Times Daily      Euthyrox 75 MCG tablet  Generic drug: levothyroxine   75 mcg, Oral, Daily      Garlic 100 MG tablet   Oral      Glucosamine-Chondroitin--400-200 MG tablet   Oral      ibuprofen 200 MG tablet  Commonly known as: ADVIL,MOTRIN   200 mg, Oral, Every 6 Hours PRN      MUCINEX ALLERGY PO   1,200 mg, Oral, Daily      O2  Commonly known as: OXYGEN   2 L/min, Inhalation, Once      oxyCODONE-acetaminophen  MG per tablet  Commonly known as: PERCOCET   1 tablet, Oral, Every 6 Hours PRN      triamterene-hydrochlorothiazide 75-50 MG per tablet  Commonly known as: MAXZIDE   1 tablet, Oral, Daily      valsartan 80 MG tablet  Commonly known as: DIOVAN   80 mg, Oral, 2 Times Daily      Vitamin D-3 25 MCG (1000 UT) capsule   1,000 Units, Oral, Daily             Discharge Diet: as tolerated    Activity at Discharge: as tolerated    Follow-up Appointments  Future Appointments   Date Time Provider Department Center   12/27/2021  1:00 PM Justice Lazar APRN [facility] POD [facility]         Test Results Pending at Discharge       Naye Tavarez MD  12/03/21  09:52 CST

## 2021-12-03 NOTE — PLAN OF CARE
Goal Outcome Evaluation:  Plan of Care Reviewed With: patient, daughter        Progress: improving  Outcome Summary: pt in chair, performed BLE AROM, pt trans sit-stand cga, pt amb 50 feet x 2 cga rollator, 02 @ 3L trans back to chair cga, pt would benefit from SNF

## 2021-12-03 NOTE — PLAN OF CARE
Goal Outcome Evaluation:  Plan of Care Reviewed With: patient        Progress: no change  Outcome Summary: VSS. Pain meds given as ordered. Pt alert and oriented with inttermittent confusion to situation. Up with assist x1 and walker to bathroom. Safety maintained.

## 2021-12-03 NOTE — PROGRESS NOTES
Naye Tavarez MD - General Surgery  Progress Note     LOS: 0 days   Patient Care Team:  Pb Mitchell APRN as PCP - General (Family Medicine)  Pb Mitchell APRN as Referring Physician (Family Medicine)  Martin Vasquez DO as Cardiologist (Cardiology)  Naye Tavarez MD as Consulting Physician (General Surgery)      Subjective     Interval History:     Pain controlled. Tolerating diet. Awaiting precert.     Objective     Vital Signs  Temp:  [97.5 °F (36.4 °C)-98.2 °F (36.8 °C)] 97.9 °F (36.6 °C)  Heart Rate:  [52-93] 77  Resp:  [16-18] 18  BP: (109-134)/(63-87) 118/77    Physical Exam:  General appearance - alert, somewhat confused   Mental status - confused  Eyes - sclera anicteric  Neck - supple, no significant adenopathy  Chest - no tachypnea, retractions or cyanosis  Heart - normal rate and regular rhythm  Abdomen - soft, nontender, nondistended, no masses or organomegaly  Breasts - left mastectomy incision without significant bruising. C/D/I. Drain in place with serosanguinous output   Neurological - screening mental status exam normal  Musculoskeletal - no joint tenderness, deformity or swelling      Results Review:    Lab Results (last 24 hours)     Procedure Component Value Units Date/Time    POC Glucose Once [[id number]]  (Normal) Collected: 12/02/21 1250    Specimen: Blood Updated: 12/02/21 1314     Glucose 118 mg/dL      Comment: : [id number] Beryl LouMeter ID: [id number]           Imaging Results (Last 24 Hours)     ** No results found for the last 24 hours. **            Assessment/Plan       Ms. Dominguez is POD 2 s/p left mastectomy with sentinel lymph node biopsy for IDC ER +.     - Regular diet, HLIV   - Prn pain and nausea control   - I had a discussion with daughter  that I think the Ms. Dominguez would benefit from SNF placement. She is an agreement to consider this. PT, OT and case management consulted. Patient accepted at [facility]. Pre-cert pending   - Drain  teaching   - Patient medically ready for discharge from surgical standpoint. Pending SNF placement.     Hypothyroidism:   - continue home euthyrox     HTN:   - continue home Alise Tavarez MD  12/03/21  09:49 CST

## 2021-12-03 NOTE — DISCHARGE INSTRUCTIONS
Wound:   - you have skin glue on your incisions. Okay to shower tomorrow.   - Leave skin glue in place, it should slowly fall off over 2 weeks   - No swimming/soaking/bathing x 2 weeks to allow incisions to heal.     Activity:   - Activity as tolerated. NO heavy lifting x 4 weeks - no more than 25lb at a time.   - No driving or operating machinery on narcotic pain medication.     Pain medication:   - Take 1000mg of tylenol every 8 hours for 3 days. After three days, take it prn.   - You have a prescription for a narcotic. It will be roxicodone 5mg tabs. Take these only as needed after you have taken the tylenol. If you are taking the roxicodone, make sure to take a stool softener (colace) with it as it can cause constipation.   - The narcotic may make you nauseated, you will have a prescription for zofran in case of nausea.     Follow up:   - make an appointment to see me in 1 week  - If you have any concerns before then, call me office at [phone]

## 2021-12-03 NOTE — CASE MANAGEMENT/SOCIAL WORK
Continued Stay Note   [place]     Patient Name: Alissa Dominguez  MRN: [id number]  Today's Date: 12/3/2021    Admit Date: 11/30/2021     Discharge Plan     Row Name 12/03/21 1439       Plan    Plan [facility] pending precert    Plan Comments Still waiting for insurance approval. Spoke with Keri at [facility] [phone], and they are still working with insurance but no decision yet.               Discharge Codes    No documentation.               Expected Discharge Date and Time     Expected Discharge Date Expected Discharge Time    Dec 3, 2021             JERMAINE Del Castillo

## 2021-12-03 NOTE — THERAPY TREATMENT NOTE
Acute Care - Physical Therapy Treatment Note  [facility]     Patient Name: Alissa Dominguez  : 1945  MRN: [id number]  Today's Date: 12/3/2021      Visit Dx:     ICD-10-CM ICD-9-CM   1. 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, estrogen receptor positive, unspecified laterality ([facility])  C50.419 174.4    Z17.0 V86.0   2. Breast mass  N63.0 611.72   3. Decreased activities of daily living (ADL)  Z78.9 V49.89   4. 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, estrogen receptor positive ([facility])   • Breast mass     Past Medical History:   Diagnosis Date   • Cataracts, bilateral    • Cellulitis of left lower limb    • Cellulitis of right lower limb    • COPD (chronic obstructive pulmonary disease) ([facility])    • DJD (degenerative joint disease), cervical     DJD C Spine/Spondylolysis, Cervical Region   • Essential (primary) hypertension    • GERD (gastroesophageal reflux disease)    • Hypothyroidism    • Major depressive disorder, recurrent, moderate ([facility])    • Migraine    • Morbid (severe) obesity due to excess calories ([facility])    • Obstructive sleep apnea    • Primary generalized (osteo)arthritis    • Pure hypercholesterolemia    • Tinea unguium    • Type 2 diabetes mellitus with hyperglycemia ([facility])    • Venous insufficiency (chronic) (peripheral)      Past Surgical History:   Procedure Laterality Date   • CATARACT EXTRACTION, BILATERAL     • CATARACT EXTRACTION, BILATERAL     • ESSURE TUBAL LIGATION     • HYSTERECTOMY     • MASTECTOMY W/ SENTINEL NODE BIOPSY Left 2021    Procedure: LEFT MASTECTOMY WITH SENTINEL LYMPH NODE  BIOPSY WITH MAGTRACE;  Surgeon: Naye Tavarez MD;  Location:  [facility] OR;  Service: General;  Laterality: Left;   • VARICOSE VEIN SURGERY Right      PT Assessment (last 12 hours)     PT Evaluation and Treatment     [facility] Name 12/03/21 1452          Physical Therapy Time and Intention    Subjective Information no complaints  -     Document Type therapy note (daily note)  [facility]     Mode of Treatment physical therapy  -[facility] Name 12/03/21 1452          General Information    Existing Precautions/Restrictions fall; oxygen therapy device and L/min  left mastectomy,AILYN drain  -     Row Name 12/03/21 1452          Pain Scale: Numbers Pre/Post-Treatment    Pretreatment Pain Rating 0/10 - no pain  -     Posttreatment Pain Rating 0/10 - no pain  -     Row Name 12/03/21 1452          Bed Mobility    Comment (Bed Mobility) chair  -[facility] Name 12/03/21 1452          Transfers    Transfers toilet transfer  -     Sit-Stand Mason (Transfers) contact guard; verbal cues  -     Mason Level (Toilet Transfer) contact guard; verbal cues  -[facility] Name 12/03/21 1452          Gait/Stairs (Locomotion)    Mason Level (Gait) contact guard; verbal cues  -     Assistive Device (Gait) walker, 4-wheeled  -     Distance in Feet (Gait) 50 x 2  -     Row Name 12/03/21 1452          Motor Skills    Therapeutic Exercise aerobic  -[facility] Name 12/03/21 1452          Aerobic Exercise    Comment, Aerobic Exercise (Therapeutic Exercise) BLE AROM in chair  -[facility] Name             Wound 11/30/21 0959 Left chest Incision    Wound - Properties Group Placement Date: 11/30/21 -AD Placement Time: 0959 -AD Present on Hospital Admission: N  -AD Side: Left  -AD Location: chest  -AD Primary Wound Type: Incision  -AD     Retired Wound - Properties Group Date first assessed: 11/30/21 -AD Time first assessed: 0959 -AD Present on Hospital Admission: N  -AD Side: Left  -AD Location: chest  -AD Primary Wound  Type: Incision  -AD     Row Name 12/03/21 1452          Plan of Care Review    Plan of Care Reviewed With patient; daughter  -     Progress improving  -     Outcome Summary pt in chair, performed BLE AROM, pt trans sit-stand cga, pt amb 50 feet x 2 cga rollator, 02 @ 3L trans back to chair cga, pt would benefit from SNF  -     Row Name 12/03/21 1452          Positioning and Restraints    Pre-Treatment Position sitting in chair/recliner  -     Post Treatment Position chair  -AH     In Chair reclined; call light within reach; encouraged to call for assist; exit alarm on; with family/caregiver; notified [facility]  -           User Key  (r) = Recorded By, (t) = Taken By, (c) = Cosigned By    Initials Name Provider Type     Tran Castillo, PTA Physical Therapy Assistant    David Vargas, RN Registered Nurse                Physical Therapy Education                 Title: PT OT SLP Therapies (In Progress)     Topic: Physical Therapy (In Progress)     Point: Mobility training (Done)     Learning Progress Summary           Patient Acceptance, E, VU by  at 12/2/2021 1232    Comment: Educated pt on proper body mechanics during transfers, benefits of OOB activity, POC, and d/c.                   Point: Home exercise program (Not Started)     Learner Progress:  Not documented in this visit.          Point: Body mechanics (Done)     Learning Progress Summary           Patient Acceptance, E, VU by  at 12/2/2021 1232    Comment: Educated pt on proper body mechanics during transfers, benefits of OOB activity, POC, and d/c.                   Point: Precautions (Not Started)     Learner Progress:  Not documented in this visit.                      User Key     Initials Effective Dates Name Provider Type [facility] 09/07/21 -  Brooke Natarajan, PT Student PT Student PT              PT Recommendation and Plan     Plan of Care Reviewed With: patient, daughter  Progress: improving  Outcome Summary: pt in chair,  performed BLE AROM, pt trans sit-stand cga, pt amb 50 feet x 2 cga rollator, 02 @ 3L trans back to chair cga, pt would benefit from SNF       Time Calculation:    PT Charges     Row Name 12/03/21 1530             Time Calculation    Start Time 1452  -      Stop Time 1522  -      Time Calculation (min) 30 min  -      PT Received On 12/03/21  -              Time Calculation- PT    Total Timed Code Minutes- PT 30 minute(s)  -AH              Timed Charges    93886 - Gait Training Minutes  30  -AH              Total Minutes    Timed Charges Total Minutes 30  -AH       Total Minutes 30  -AH            User Key  (r) = Recorded By, (t) = Taken By, (c) = Cosigned By    Initials Name Provider Type     Tran Castillo PTA Physical Therapy Assistant              Therapy Charges for Today     Code Description Service Date Service Provider Modifiers Qty    [id number] HC GAIT TRAINING EA 15 MIN 12/3/2021 Tran Castillo PTA GP 2          PT G-Codes  Outcome Measure Options: AM-PAC 6 Clicks Basic Mobility (PT)  AM-PAC 6 Clicks Score (PT): 18  AM-PAC 6 Clicks Score (OT): 18    Tran Castillo PTA  12/3/2021

## 2021-12-04 NOTE — PLAN OF CARE
Goal Outcome Evaluation:  Plan of Care Reviewed With: patient        Progress: no change  Outcome Summary: pt is up in chair;  regular diet;  up to BR to urinate;  pain med given x 1 then also acetaminophen given;  chair alarm in place;  safety maintained

## 2021-12-04 NOTE — CASE MANAGEMENT/SOCIAL WORK
Continued Stay Note   [facility]     Patient Name: Alissa Dominguez  MRN: [id number]  Today's Date: 12/4/2021    Admit Date: 11/30/2021     Discharge Plan     Row Name 12/04/21 [facility]       Plan    Plan SNF - [facility]    Patient/Family in Agreement with Plan yes    Plan Comments Patient's insurance has approved placement at [facility].  Patient can discharge to this facility today pending negative COVID test.  Negative COVID test, dc summary and prescriptions will need to be faxed to [phone].  Patient's daughter plans to transport patient to facility.  Nursing to call report to [phone].    Final Discharge Disposition Code 03 - skilled nursing facility (SNF)    Final Note [facility]               Discharge Codes    No documentation.               Expected Discharge Date and Time     Expected Discharge Date Expected Discharge Time    Dec 3, 2021             RAYMOND Munoz

## 2021-12-04 NOTE — PLAN OF CARE
Goal Outcome Evaluation:              Outcome Summary: Up in chair. Reg diet. BM today. Amb in wheat with PT and rollator walker x1. C/o pain managed with analgesics; see MAR. AILYN x1 to right upper chest. Adriel c/d/i; g/t with ace wrap. No drainage. VSS. Safety maintained.

## 2021-12-04 NOTE — DISCHARGE PLACEMENT REQUEST
"To: [place]      From: Cydney Farris [phone]          Karly Dominguez (75 y.o. Female)             Date of Birth Social Security Number Address Home Phone MRN    1945  [address]  [place] [id number] [phone] [id number]    Orthodox Marital Status             Religious        Admission Date Admission Type Admitting Provider Attending Provider Department, Room/Bed    11/30/21 Elective Naye Tavarez MD  [facility] 3C, 386/1    Discharge Date Discharge Disposition Discharge Destination          12/4/2021 Skilled Nursing Facility ([facility] - External)              Attending Provider: (none)   Allergies: Augmentin [Amoxicillin-pot Clavulanate], Ceclor [Cefaclor], Codeine, Darvon [Propoxyphene], Fulvicin P-g [Griseofulvin], Niacin, Valium [Diazepam]    Isolation: None   Infection: None   Code Status: CPR   Advance Care Planning Activity    Ht: 170 cm (66.93\")   Wt: 95.8 kg (211 lb 3.2 oz)    Admission Cmt: None   Principal Problem: 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, estrogen receptor positive (HCC) [C50.419,Z17.0]                 Active Insurance as of 11/30/2021     Primary Coverage     Payor Plan Insurance Group Employer/Plan Group    [employer]HE[employer] MEDICARE REPLACEMENT AN[employer]M MEDICARE ADVANTAGE [id number]     Payor Plan Address Payor Plan Phone Number Payor Plan Fax Number Effective Dates    [address] [phone]  1/1/2015 - None Entered    [facility] [address]       Subscriber Name Subscriber Birth Date Member ID       KARLY DOMINGUEZ 1945 [id number]                 Emergency Contacts      (Rel.) Home Phone Work Phone Mobile Phone    Kassidy Lind (Daughter) [phone] -- [phone]                 Discharge Summary      Naye Tavarez MD at 12/03/21 0952          Consults     No orders found from 11/1/2021 to 12/1/2021.       Naye Tavarez MD - Discharge Summary    Date of Discharge:  " 12/3/2021    Discharge Diagnosis: breast cancer     Presenting Problem/History of Present Illness  Breast mass [N63.0]     Hospital Course  Patient is a 75 y.o. female presented with a 2.2 x 2.3 x 1.5cm ER + left breast cancer. Axillary US with no concerning lymph nodes. She underwent a left mastectomy with sentinel lymph node biopsy. Post operatively her diet was advanced and her pain was controlled with oral pain medication. PT and OT saw her and recommended SNF for rehab. Family and patient amenable. Drain removed on day of discharge. Discharge to facility with plan to follow up with me in 2 weeks.     Procedures Performed  Procedure(s):  LEFT MASTECTOMY WITH SENTINEL LYMPH NODE BIOPSY WITH MAGTRACE       Consults:   Consults     No orders found from 11/1/2021 to 12/1/2021.          Condition on Discharge:  Improved     Vital Signs  Temp:  [97.5 °F (36.4 °C)-98.2 °F (36.8 °C)] 97.9 °F (36.6 °C)  Heart Rate:  [52-93] 77  Resp:  [16-18] 18  BP: (109-134)/(63-87) 118/77    Physical Exam:   See History and Physical found in chart.    Discharge Disposition  Skilled Nursing Facility (DC - External)    Discharge Medications     Discharge Medications      New Medications      Instructions Start Date   acetaminophen 325 MG tablet  Commonly known as: Tylenol   975 mg, Oral, Every 8 Hours, Take every 8 hours for 3 days then take prn as needed.      cephalexin 500 MG capsule  Commonly known as: KEFLEX   500 mg, Oral, 2 Times Daily      cyclobenzaprine 5 MG tablet  Commonly known as: FLEXERIL   5 mg, Oral, Every 8 Hours PRN      ondansetron 4 MG tablet  Commonly known as: Zofran   4 mg, Oral, Every 8 Hours PRN      oxyCODONE 5 MG immediate release tablet  Commonly known as: Roxicodone   5 mg, Oral, Every 8 Hours PRN         Continue These Medications      Instructions Start Date   Calcium 600+D3 600-200 MG-UNIT tablet  Generic drug: Calcium Carb-Cholecalciferol   Oral      docusate sodium 100 MG capsule  Commonly known as:  COLACE   100 mg, Oral, 2 Times Daily      Euthyrox 75 MCG tablet  Generic drug: levothyroxine   75 mcg, Oral, Daily      Garlic 100 MG tablet   Oral      Glucosamine-Chondroitin--400-200 MG tablet   Oral      ibuprofen 200 MG tablet  Commonly known as: ADVIL,MOTRIN   200 mg, Oral, Every 6 Hours PRN      MUCINEX ALLERGY PO   1,200 mg, Oral, Daily      O2  Commonly known as: OXYGEN   2 L/min, Inhalation, Once      oxyCODONE-acetaminophen  MG per tablet  Commonly known as: PERCOCET   1 tablet, Oral, Every 6 Hours PRN      triamterene-hydrochlorothiazide 75-50 MG per tablet  Commonly known as: MAXZIDE   1 tablet, Oral, Daily      valsartan 80 MG tablet  Commonly known as: DIOVAN   80 mg, Oral, 2 Times Daily      Vitamin D-3 25 MCG (1000 UT) capsule   1,000 Units, Oral, Daily             Discharge Diet: as tolerated    Activity at Discharge: as tolerated    Follow-up Appointments  Future Appointments   Date Time Provider Department Center   12/27/2021  1:00 PM Justice Lazar APRN [facility]         Test Results Pending at Discharge       Naye Tavarez MD  12/03/21  09:52 CST                Electronically signed by Naye Tavarez MD at 12/04/21 1018         COVID PRE-OP / PRE-PROCEDURE SCREENING ORDER (NO ISOLATION) - Swab, Nasal Cavity [[id number]] (Order [id number])  Order  Date: 12/4/2021 Department: [address] Released By: India Estes RN (auto-released) Authorizing: Naye Tavarez MD       Linked Results    Procedure Abnormality Status   COVID-19,[facility] IN-HOUSE,Nasal Swab No Transport Media 3-4 HR TAT - Swab, Nasal Cavity Normal Final result     Reprint Order Requisition    COVID PRE-OP / PRE-PROCEDURE SCREENING ORDER (NO ISOLATION) - Swab, Nasal Cavity (Order #[id number]) on 12/4/21           COVID PRE-OP / PRE-PROCEDURE SCREENING ORDER (NO ISOLATION) - Swab, Nasal Cavity  Order: [id number]   Status: Final result       Visible to patient: No (not released)        Next appt: 12/27/2021 at 01:00 PM in Podiatry (Justice Lazar, APRN)      Specimen Information: Nasal Cavity; Swab         0 Result Notes              Specimen Collected: 12/04/21 10:18 Last Resulted: 12/04/21 11:16       Order Details       View Encounter       Lab and Collection Details       Routing       Result History               Result Care Coordination      Patient Communication    Not Released Not seen Back to Top           COVID-19,[facility] IN-HOUSE,Nasal Swab No Transport Media 3-4 HR TAT - Swab, Nasal Cavity  Order: [id number] - Part of Panel Order [id number]   Status: Final result       Visible to patient: No (not released)       Next appt: 12/27/2021 at 01:00 PM in Podiatry (Justice Lazar, APRN)      Specimen Information: Nasal Cavity; Swab         0 Result Notes      Component   Ref Range & Units    COVID19   Not Detected - Ref. Range Not Detected    Resulting Agency  [facility]               Narrative  Performed by:  [facility]  Fact sheet for providers: https://www.fda.gov/media/747750/download     Fact sheet for patients: https://www.fda.gov/media/228151/download     Test performed by PCR.     Consider negative results in combination with clinical observations, patient history, and epidemiological information.   Fact sheet for providers: https://www.fda.gov/media/018471/download     Fact sheet for patients: https://www.fda.gov/media/185873/download     Test performed by PCR.     Consider negative results in combination with clinical observations, patient history, and epidemiological information.      Specimen Collected: 12/04/21 10:18 Last Resulted: 12/04/21 11:16       Order Details       View Encounter       Lab and Collection Details       Routing       Result History               Result Care Coordination      Patient Communication    Not Released Not seen Back to Top           Provider Comment To Patient      COVID PRE-OP / PRE-PROCEDURE SCREENING ORDER (NO ISOLATION) - Swab, Nasal  Cavity    Not Released Not seen        COVID-19,[facility] IN-HOUSE,Nasal Swab No Transport Media 3-4 HR TAT - Swab, Nasal Cavity    Not Released Not seen     Result Read / Acknowledged    Acknowledge result    COVID-19,[facility] IN-HOUSE,Nasal Swab No Transport Media 3-4 HR TAT - Swab, Nasal Cavity (Order [id number])    No acknowledgement history exists for this order.     Lab Component SmartPhrase Guide    COVID PRE-OP / PRE-PROCEDURE SCREENING ORDER (NO ISOLATION) - Swab, Nasal Cavity (Order #[id number]) on 12/4/21

## 2021-12-05 NOTE — THERAPY DISCHARGE NOTE
Acute Care - Physical Therapy Discharge Summary  [facility]       Patient Name: Alissa Dominguez  : 1945  MRN: [id number]    Today's Date: 2021                 Admit Date: 2021      PT Recommendation and Plan    Visit Dx:    ICD-10-CM ICD-9-CM   1. 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, estrogen receptor positive, unspecified laterality (HCC)  C50.419 174.4    Z17.0 V86.0   2. Breast mass  N63.0 611.72   3. Decreased activities of daily living (ADL)  Z78.9 V49.89   4. Impaired mobility  Z74.09 799.89                PT Rehab Goals     Row Name 21 0725             Bed Mobility Goal 1 (PT)    Activity/Assistive Device (Bed Mobility Goal 1, PT) rolling to left; rolling to right; scooting; sit to supine; supine to sit  -AB      [place] Level/Cues Needed (Bed Mobility Goal 1, PT) modified independence  -AB      Time Frame (Bed Mobility Goal 1, PT) long term goal (LTG); 10 days  -AB      Progress/Outcomes (Bed Mobility Goal 1, PT) goal not met  -AB              Transfer Goal 1 (PT)    Activity/Assistive Device (Transfer Goal 1, PT) sit-to-stand/stand-to-sit; bed-to-chair/chair-to-bed; walker, rolling  -AB      [place] Level/Cues Needed (Transfer Goal 1, PT) standby assist  -AB      Time Frame (Transfer Goal 1, PT) long term goal (LTG); 10 days  -AB      Progress/Outcome (Transfer Goal 1, PT) goal not met  -AB              Gait Training Goal 1 (PT)    Activity/Assistive Device (Gait Training Goal 1, PT) gait (walking locomotion); assistive device use; decrease fall risk; diminish gait deviation; improve balance and speed; increase energy conservation; walker, rolling  -AB      [place] Level (Gait Training Goal 1, PT) standby assist  -AB      Distance (Gait Training Goal 1, PT) 90 ft x 2  -AB      Time Frame (Gait Training Goal 1, PT) long term goal (LTG); 10 days  -AB      Progress/Outcome (Gait Training Goal 1, PT) goal not met  -AB              Problem  Specific Goal 1 (PT)    Problem Specific Goal 1 (PT) Maintain standing balance during all transfers and gait training demonstrating no LOB.  -AB      Time Frame (Problem Specific Goal 1, PT) long-term goal (LTG)  -AB      Progress/Outcome (Problem Specific Goal 1, PT) goal not met  -AB              Patient Education Goal (PT)    Activity (Patient Education Goal, PT) Demonstrate safety techniques with use of AD during future treatment sessions.  -AB      Lindsay/Cues/Accuracy (Memory Goal 2, PT) demonstrates adequately; independent  -AB      Time Frame (Patient Education Goal, PT) long term goal (LTG); 10 days  -AB      Progress/Outcome (Patient Education Goal, PT) goal not met  -AB            User Key  (r) = Recorded By, (t) = Taken By, (c) = Cosigned By    Initials Name Provider Type Discipline    Marybel Wong, PTA Physical Therapy Assistant PT                    PT Discharge Summary  Anticipated Discharge Disposition (PT): skilled nursing facility  Reason for Discharge: Discharge from facility  Outcomes Achieved: Refer to plan of care for updates on goals achieved  Discharge Destination: SNF      Marybel Bauman PTA   12/5/2021

## 2021-12-05 NOTE — THERAPY DISCHARGE NOTE
Acute Care - Occupational Therapy Discharge Summary  [facility]     Patient Name: Alissa Dominguez  : 1945  MRN: [id number]    Today's Date: 2021                 Admit Date: 2021        OT Recommendation and Plan    Visit Dx:    ICD-10-CM ICD-9-CM   1. 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, estrogen receptor positive, unspecified laterality (HCC)  C50.419 174.4    Z17.0 V86.0   2. Breast mass  N63.0 611.72   3. Decreased activities of daily living (ADL)  Z78.9 V49.89   4. Impaired mobility  Z74.09 799.89                OT Rehab Goals     Row Name 21 0758             Transfer Goal 1 (OT)    Activity/Assistive Device (Transfer Goal 1, OT) sit-to-stand/stand-to-sit; bed-to-chair/chair-to-bed; toilet; tub; walk-in shower  -LS      [place] Level/Cues Needed (Transfer Goal 1, OT) supervision required  -LS      Time Frame (Transfer Goal 1, OT) long term goal (LTG); 10 days  -LS      Progress/Outcome (Transfer Goal 1, OT) goal not met  -LS              Bathing Goal 1 (OT)    Activity/Device (Bathing Goal 1, OT) bathing skills, all; grab bar, tub/shower; shower chair  -LS      [place] Level/Cues Needed (Bathing Goal 1, OT) set-up required; minimum assist (75% or more patient effort)  -LS      Time Frame (Bathing Goal 1, OT) long term goal (LTG); 10 days  -LS      Progress/Outcomes (Bathing Goal 1, OT) goal not met  -LS              Dressing Goal 1 (OT)    Activity/Device (Dressing Goal 1, OT) lower body dressing  -LS      [place]/Cues Needed (Dressing Goal 1, OT) minimum assist (75% or more patient effort)  -LS      Time Frame (Dressing Goal 1, OT) long term goal (LTG); 10 days  -LS      Progress/Outcome (Dressing Goal 1, OT) goal not met  -LS            User Key  (r) = Recorded By, (t) = Taken By, (c) = Cosigned By    Initials Name Provider Type Discipline    LS Selina Rousseau COTA Occupational Therapy Assistant THERAPIES                            OT Discharge  Summary  Reason for Discharge: Discharge from facility  Outcomes Achieved: Refer to plan of care for updates on goals achieved  Discharge Destination: SNF      NARA MARINELLI  12/5/2021

## 2021-12-21 PROBLEM — N64.89 HEMATOMA (NONTRAUMATIC) OF BREAST: Status: ACTIVE | Noted: 2021-01-01

## 2021-12-21 NOTE — H&P
The H&P is scanned. No changes. To OR for drainage of left mastectomy seroma/hematoma.     Naye Tavarez MD  12/21/21

## 2021-12-21 NOTE — ANESTHESIA POSTPROCEDURE EVALUATION
"Patient: Alissa Dominguez    Procedure Summary     Date: 12/21/21 Room / Location:  [facility]  /  [facility]    Anesthesia Start: 0914 Anesthesia Stop: 0958    Procedure: EVACUATION OF LEFT MASTECTOMY HEMATOMA (Left Breast) Diagnosis: (HEMATOMA)    Surgeons: Naye Tavarez MD Provider: Carlene Carrillo CRNA    Anesthesia Type: general ASA Status: 3 - Emergent          Anesthesia Type: general    Vitals  Vitals Value Taken Time   /85 12/21/21 1031   Temp 97.7 °F (36.5 °C) 12/21/21 1000   Pulse 95 12/21/21 1041   Resp 12 12/21/21 1000   SpO2 87 % 12/21/21 1020   Vitals shown include unvalidated device data.        Post Anesthesia Care and Evaluation    Patient location during evaluation: PHASE II  Patient participation: complete - patient participated  Level of consciousness: awake and alert  Pain management: adequate  Airway patency: patent  Anesthetic complications: No anesthetic complications    Cardiovascular status: acceptable  Respiratory status: acceptable  Hydration status: acceptable    Comments: Blood pressure 129/77, pulse 95, temperature 97.7 °F (36.5 °C), temperature source Temporal, resp. rate 12, height 163.5 cm (64.37\"), weight 89.6 kg (197 lb 8.5 oz), SpO2 90%      "

## 2021-12-21 NOTE — ANESTHESIA PREPROCEDURE EVALUATION
Anesthesia Evaluation     Patient summary reviewed and Nursing notes reviewed   history of anesthetic complications: PONV  NPO Solid Status: > 8 hours  NPO Liquid Status: > 8 hours           Airway   Mallampati: II  TM distance: >3 FB  Neck ROM: full  Possible difficult intubation  Dental    (+) upper dentures    Pulmonary    (+) COPD, home oxygen, shortness of breath, sleep apnea,   Cardiovascular   Exercise tolerance: poor (<4 METS)    ECG reviewed    (+) hypertension, dysrhythmias PAC, hyperlipidemia,     ROS comment: Low risk stress echo 11/2021    Echo  · Left ventricular systolic function is low normal. Estimated EF appears to be in the range of 51 - 55%.  · Left ventricular diastolic dysfunction (grade I) consistent with impaired relaxation.  · Right ventricular cavity is mildly dilated. Mildly reduced right ventricular systolic function noted.  · Trace tricuspid valve regurgitation is present. Estimated right ventricular systolic pressure from tricuspid regurgitation is mildly elevated (35-45 mmHg).        Neuro/Psych  (+) headaches, psychiatric history Depression,     (-) seizures, TIA, CVA  GI/Hepatic/Renal/Endo    (+) obesity,  GERD,  diabetes mellitus, thyroid problem hypothyroidism    Musculoskeletal     Abdominal    Substance History      OB/GYN          Other   arthritis,          Phys Exam Other: Moderate kyphosis, good neck extension; nauseated                   Anesthesia Plan    ASA 3 - emergent     general   (Pt on home O2. Increased risk of PPC. Very nauseated in holding. Severe hyponatremia--appropriate to proceed )  intravenous induction     Anesthetic plan, all risks, benefits, and alternatives have been provided, discussed and informed consent has been obtained with: patient.

## 2021-12-21 NOTE — OP NOTE
Incision and Drainage of Left Mastectomy Hematoma Operative Report:     Patient: Alissa Dominguez  MRN: [id number]    YOB: 1945  Age: 76 y.o.  Sex: female  Unit:  PAD OR Room/Bed: PAD OR/MAIN OR Location: [facility]      Admitting Physician: TWILA TAVAREZ    Primary Care Physician: Pb Mitchell APRN             INDICATIONS: This is a 76 y.o. year old female who underwent a left mastectomy with sentinel lymph node biopsy on 11/30/21. She was discharged to a nursing home on POD 4 and at that time her incision looked good with no hematoma nor seroma. Her drain had put out <20 cc/day. Her drain was removed and she was sent to the nursing home. She follow up in my office and was noted to have a large seroma/hematoma in her left mastectomy surgical site. We attempted aspiration in the office, however it was clotted and I was unable to completely evacuate the hematoma. I recommended opening a small portion of the incision and draining the hematoma with drain placement. Risks, benefits and alternatives were discussed. Consent was obtained.     DATE OF OPERATION: 12/21/2021     Surgeon(s) and Role:     * Twila Tavarez MD - Primary    ANESTHESIA: Monitored Anesthesia Care     PREOPERATIVE DIAGNOSIS: HEMATOMA    POSTOPERATIVE DIAGNOSIS: Same    PROCEDURES PERFORMED:  Incision and evacuation of a post operative hematoma     PROCEDURE DETAILS:   Consent was obtained. The patient was transferred to the OR. Pre operative antibiotics were given. MAC anesthesia was induced. The left breast was prepped with chloraprep and draped in sterile fashion. A timeout was performed. There was a 2.5cm area of skin necrosis at the superior aspect of the mid lateral portion of the incision. This was excised along the incision. 1300cc of old blood was suctioned out. The wound was copiously irrigated and suctioned. A 15french AILYN drain was placed. No active bleeding was noted. The incision was closed with  interrupted 3-0 vicryl sutures. It was dressed with mastisol and steri strips. The drain was sutured into place with a 2-0 silk. A drain dressing was placed. ABDs, guaze and a double ace was placed. The patient tolerated the procedure well and was transferred to PACU in good condition.     Findings: Hematoma, 1300cc old blood   Estimated Blood Loss: minimal new blood, 1300cc old blood  Complications: none apparent            Specimens: None      Disposition: PACU - hemodynamically stable.           Condition: stable    Naye Tavarez MD  12/16/2021

## 2021-12-21 NOTE — DISCHARGE INSTRUCTIONS
YOUR NEXT PAIN MEDICATION IS DUE AT______________        Moderate Conscious Sedation, Adult, Care After  Refer to this sheet in the next few weeks. These instructions provide you with information on caring for yourself after your procedure. Your health care provider may also give you more specific instructions. Your treatment has been planned according to current medical practices, but problems sometimes occur. Call your health care provider if you have any problems or questions after your procedure.  WHAT TO EXPECT AFTER THE PROCEDURE    After your procedure:  · You may feel sleepy, clumsy, and have poor balance for several hours.  · Vomiting may occur if you eat too soon after the procedure.  HOME CARE INSTRUCTIONS  · Do not participate in any activities where you could become injured for at least 24 hours. Do not:  ¨ Drive.  ¨ Swim.  ¨ Ride a bicycle.  ¨ Operate heavy machinery.  ¨ Cook.  ¨ Use power tools.  ¨ Climb ladders.  ¨ Work from a high place.  · Do not make important decisions or sign legal documents until you are improved.  · If you vomit, drink water, juice, or soup when you can drink without vomiting. Make sure you have little or no nausea before eating solid foods.  · Only take over-the-counter or prescription medicines for pain, discomfort, or fever as directed by your health care provider.  · Make sure you and your family fully understand everything about the medicines given to you, including what side effects may occur.  · You should not drink alcohol, take sleeping pills, or take medicines that cause drowsiness for at least 24 hours.  · If you smoke, do not smoke without supervision.  · If you are feeling better, you may resume normal activities 24 hours after you were sedated.  · Keep all appointments with your health care provider.  SEEK MEDICAL CARE IF:  · Your skin is pale or bluish in color.  · You continue to feel nauseous or vomit.  · Your pain is getting worse and is not helped by  medicine.  · You have bleeding or swelling.  · You are still sleepy or feeling clumsy after 24 hours.  SEEK IMMEDIATE MEDICAL CARE IF:  · You develop a rash.  · You have difficulty breathing.  · You develop any type of allergic problem.  · You have a fever.  MAKE SURE YOU:  · Understand these instructions.  · Will watch your condition.  · Will get help right away if you are not doing well or get worse.     This information is not intended to replace advice given to you by your health care provider. Make sure you discuss any questions you have with your health care provider.     Document Released: 10/08/2014 Document Revised: 01/08/2016 Document Reviewed: 10/08/2014  [employer] Interactive Patient Education ©2016 [employer]         CALL YOUR PHYSICIAN IF YOU EXPERIENCE  INCREASED PAIN NOT HELPED BY YOUR PAIN MEDICATION.        Fall Prevention in the Home      Falls can cause injuries. They can happen to people of all ages. There are many things you can do to make your home safe and to help prevent falls.    WHAT CAN I DO ON THE OUTSIDE OF MY HOME?  · Regularly fix the edges of walkways and driveways and fix any cracks.  · Remove anything that might make you trip as you walk through a door, such as a raised step or threshold.  · Trim any bushes or trees on the path to your home.  · Use bright outdoor lighting.  · Clear any walking paths of anything that might make someone trip, such as rocks or tools.  · Regularly check to see if handrails are loose or broken. Make sure that both sides of any steps have handrails.  · Any raised decks and porches should have guardrails on the edges.  · Have any leaves, snow, or ice cleared regularly.  · Use sand or salt on walking paths during winter.  · Clean up any spills in your garage right away. This includes oil or grease spills.  WHAT CAN I DO IN THE BATHROOM?    · Use night lights.  · Install grab bars by the toilet and in the tub and shower. Do not use towel bars as grab  bars.  · Use non-skid mats or decals in the tub or shower.  · If you need to sit down in the shower, use a plastic, non-slip stool.  · Keep the floor dry. Clean up any water that spills on the floor as soon as it happens.  · Remove soap buildup in the tub or shower regularly.  · Attach bath mats securely with double-sided non-slip rug tape.  · Do not have throw rugs and other things on the floor that can make you trip.  WHAT CAN I DO IN THE BEDROOM?  · Use night lights.  · Make sure that you have a light by your bed that is easy to reach.  · Do not use any sheets or blankets that are too big for your bed. They should not hang down onto the floor.  · Have a firm chair that has side arms. You can use this for support while you get dressed.  · Do not have throw rugs and other things on the floor that can make you trip.  WHAT CAN I DO IN THE KITCHEN?  · Clean up any spills right away.  · Avoid walking on wet floors.  · Keep items that you use a lot in easy-to-reach places.  · If you need to reach something above you, use a strong step stool that has a grab bar.  · Keep electrical cords out of the way.  · Do not use floor polish or wax that makes floors slippery. If you must use wax, use non-skid floor wax.  · Do not have throw rugs and other things on the floor that can make you trip.  WHAT CAN I DO WITH MY STAIRS?  · Do not leave any items on the stairs.  · Make sure that there are handrails on both sides of the stairs and use them. Fix handrails that are broken or loose. Make sure that handrails are as long as the stairways.  · Check any carpeting to make sure that it is firmly attached to the stairs. Fix any carpet that is loose or worn.  · Avoid having throw rugs at the top or bottom of the stairs. If you do have throw rugs, attach them to the floor with carpet tape.  · Make sure that you have a light switch at the top of the stairs and the bottom of the stairs. If you do not have them, ask someone to add them for  you.  WHAT ELSE CAN I DO TO HELP PREVENT FALLS?  · Wear shoes that:  ¨ Do not have high heels.  ¨ Have rubber bottoms.  ¨ Are comfortable and fit you well.  ¨ Are closed at the toe. Do not wear sandals.  · If you use a stepladder:  ¨ Make sure that it is fully opened. Do not climb a closed stepladder.  ¨ Make sure that both sides of the stepladder are locked into place.  ¨ Ask someone to hold it for you, if possible.  · Clearly renata and make sure that you can see:  ¨ Any grab bars or handrails.  ¨ First and last steps.  ¨ Where the edge of each step is.  · Use tools that help you move around (mobility aids) if they are needed. These include:  ¨ Canes.  ¨ Walkers.  ¨ Scooters.  ¨ Crutches.  · Turn on the lights when you go into a dark area. Replace any light bulbs as soon as they burn out.  · Set up your furniture so you have a clear path. Avoid moving your furniture around.  · If any of your floors are uneven, fix them.  · If there are any pets around you, be aware of where they are.  · Review your medicines with your doctor. Some medicines can make you feel dizzy. This can increase your chance of falling.  Ask your doctor what other things that you can do to help prevent falls.     This information is not intended to replace advice given to you by your health care provider. Make sure you discuss any questions you have with your health care provider.     Document Released: 10/14/2010 Document Revised: 05/03/2016 Document Reviewed: 01/22/2016  [employer] Interactive Patient Education ©2016 [employer]     PATIENT/FAMILY/RESPONSIBLE PARTY VERBALIZES UNDERSTANDING OF ABOVE EDUCATION.  COPY OF PAIN SCALE GIVEN AND REVIEWED WITH VERBALIZED UNDERSTANDING.

## 2022-01-01 ENCOUNTER — APPOINTMENT (OUTPATIENT)
Dept: CT IMAGING | Facility: HOSPITAL | Age: 77
End: 2022-01-01

## 2022-01-01 ENCOUNTER — APPOINTMENT (OUTPATIENT)
Dept: ULTRASOUND IMAGING | Facility: HOSPITAL | Age: 77
End: 2022-01-01

## 2022-01-01 ENCOUNTER — APPOINTMENT (OUTPATIENT)
Dept: GENERAL RADIOLOGY | Facility: HOSPITAL | Age: 77
End: 2022-01-01

## 2022-01-01 ENCOUNTER — HOSPITAL ENCOUNTER (INPATIENT)
Facility: HOSPITAL | Age: 77
LOS: 5 days | Discharge: NURSING, REHAB OR LONG-TERM CARE | End: 2022-01-13
Attending: EMERGENCY MEDICINE | Admitting: INTERNAL MEDICINE

## 2022-01-01 ENCOUNTER — HOSPITAL ENCOUNTER (OUTPATIENT)
Facility: HOSPITAL | Age: 77
Setting detail: OBSERVATION
Discharge: NURSING, REHAB OR LONG-TERM CARE | End: 2022-01-20
Attending: INTERNAL MEDICINE | Admitting: FAMILY MEDICINE

## 2022-01-01 VITALS
BODY MASS INDEX: 27.93 KG/M2 | HEIGHT: 68 IN | RESPIRATION RATE: 16 BRPM | HEART RATE: 105 BPM | OXYGEN SATURATION: 95 % | WEIGHT: 184.3 LBS | SYSTOLIC BLOOD PRESSURE: 148 MMHG | TEMPERATURE: 98.8 F | DIASTOLIC BLOOD PRESSURE: 82 MMHG

## 2022-01-01 VITALS
SYSTOLIC BLOOD PRESSURE: 140 MMHG | TEMPERATURE: 98.2 F | WEIGHT: 202.2 LBS | OXYGEN SATURATION: 95 % | HEART RATE: 98 BPM | HEIGHT: 62 IN | DIASTOLIC BLOOD PRESSURE: 94 MMHG | BODY MASS INDEX: 37.21 KG/M2 | RESPIRATION RATE: 18 BRPM

## 2022-01-01 DIAGNOSIS — Z74.09 IMPAIRED FUNCTIONAL MOBILITY AND ACTIVITY TOLERANCE: ICD-10-CM

## 2022-01-01 DIAGNOSIS — Z78.9 DECREASED ACTIVITIES OF DAILY LIVING (ADL): ICD-10-CM

## 2022-01-01 DIAGNOSIS — Z17.0 MALIGNANT NEOPLASM OF UPPER-OUTER QUADRANT OF BREAST IN FEMALE, ESTROGEN RECEPTOR POSITIVE, UNSPECIFIED LATERALITY: ICD-10-CM

## 2022-01-01 DIAGNOSIS — N39.0 URINARY TRACT INFECTION WITHOUT HEMATURIA, SITE UNSPECIFIED: ICD-10-CM

## 2022-01-01 DIAGNOSIS — S09.90XA CLOSED HEAD INJURY, INITIAL ENCOUNTER: ICD-10-CM

## 2022-01-01 DIAGNOSIS — E87.6 HYPOKALEMIA: ICD-10-CM

## 2022-01-01 DIAGNOSIS — R41.82 ALTERED MENTAL STATUS, UNSPECIFIED ALTERED MENTAL STATUS TYPE: ICD-10-CM

## 2022-01-01 DIAGNOSIS — C50.419 MALIGNANT NEOPLASM OF UPPER-OUTER QUADRANT OF BREAST IN FEMALE, ESTROGEN RECEPTOR POSITIVE, UNSPECIFIED LATERALITY: ICD-10-CM

## 2022-01-01 DIAGNOSIS — R41.0 DELIRIUM: ICD-10-CM
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
## 2022-01-01 DIAGNOSIS — Z74.09 IMPAIRED MOBILITY: ICD-10-CM

## 2022-01-01 DIAGNOSIS — R13.10 DYSPHAGIA, UNSPECIFIED TYPE: ICD-10-CM

## 2022-01-01 DIAGNOSIS — Z74.09 IMPAIRED MOBILITY AND ADLS: ICD-10-CM

## 2022-01-01 DIAGNOSIS — R09.02 HYPOXIA: Primary | ICD-10-CM

## 2022-01-01 DIAGNOSIS — Z78.9 IMPAIRED MOBILITY AND ADLS: ICD-10-CM

## 2022-01-01 DIAGNOSIS — E87.1 HYPONATREMIA: Primary | ICD-10-CM

## 2022-01-01 LAB
ALBUMIN SERPL-MCNC: 3.7 G/DL (ref 3.5–5.2)
ALBUMIN SERPL-MCNC: 3.8 G/DL (ref 3.5–5.2)
ALBUMIN SERPL-MCNC: 4 G/DL (ref 3.5–5.2)
ALBUMIN/GLOB SERPL: 1.2 G/DL
ALBUMIN/GLOB SERPL: 1.3 G/DL
ALBUMIN/GLOB SERPL: 1.3 G/DL
ALP SERPL-CCNC: 57 U/L (ref 39–117)
ALP SERPL-CCNC: 60 U/L (ref 39–117)
ALP SERPL-CCNC: 66 U/L (ref 39–117)
ALT SERPL W P-5'-P-CCNC: 11 U/L (ref 1–33)
ALT SERPL W P-5'-P-CCNC: 12 U/L (ref 1–33)
ALT SERPL W P-5'-P-CCNC: 13 U/L (ref 1–33)
AMPHET+METHAMPHET UR QL: NEGATIVE
AMPHETAMINES UR QL: NEGATIVE
ANION GAP SERPL CALCULATED.3IONS-SCNC: 11 MMOL/L (ref 5–15)
ANION GAP SERPL CALCULATED.3IONS-SCNC: 12 MMOL/L (ref 5–15)
ANION GAP SERPL CALCULATED.3IONS-SCNC: 13 MMOL/L (ref 5–15)
ANION GAP SERPL CALCULATED.3IONS-SCNC: 6 MMOL/L (ref 5–15)
ANION GAP SERPL CALCULATED.3IONS-SCNC: 7 MMOL/L (ref 5–15)
ANION GAP SERPL CALCULATED.3IONS-SCNC: 7 MMOL/L (ref 5–15)
ANION GAP SERPL CALCULATED.3IONS-SCNC: 8 MMOL/L (ref 5–15)
ANION GAP SERPL CALCULATED.3IONS-SCNC: 9 MMOL/L (ref 5–15)
APTT PPP: 33.2 SECONDS (ref 24.1–35)
AST SERPL-CCNC: 19 U/L (ref 1–32)
AST SERPL-CCNC: 21 U/L (ref 1–32)
AST SERPL-CCNC: 36 U/L (ref 1–32)
BACTERIA SPEC AEROBE CULT: ABNORMAL
BACTERIA SPEC AEROBE CULT: NORMAL
BACTERIA UR QL AUTO: ABNORMAL /HPF
BACTERIA UR QL AUTO: ABNORMAL /HPF
BARBITURATES UR QL SCN: NEGATIVE
BASOPHILS # BLD AUTO: 0.03 10*3/MM3 (ref 0–0.2)
BASOPHILS NFR BLD AUTO: 0.5 % (ref 0–1.5)
BENZODIAZ UR QL SCN: NEGATIVE
BILIRUB SERPL-MCNC: 0.6 MG/DL (ref 0–1.2)
BILIRUB SERPL-MCNC: 0.7 MG/DL (ref 0–1.2)
BILIRUB SERPL-MCNC: 0.8 MG/DL (ref 0–1.2)
BILIRUB UR QL STRIP: ABNORMAL
BILIRUB UR QL STRIP: NEGATIVE
BUN SERPL-MCNC: 10 MG/DL (ref 8–23)
BUN SERPL-MCNC: 11 MG/DL (ref 8–23)
BUN SERPL-MCNC: 11 MG/DL (ref 8–23)
BUN SERPL-MCNC: 12 MG/DL (ref 8–23)
BUN SERPL-MCNC: 12 MG/DL (ref 8–23)
BUN SERPL-MCNC: 16 MG/DL (ref 8–23)
BUN SERPL-MCNC: 18 MG/DL (ref 8–23)
BUN SERPL-MCNC: 4 MG/DL (ref 8–23)
BUN SERPL-MCNC: 6 MG/DL (ref 8–23)
BUN SERPL-MCNC: 8 MG/DL (ref 8–23)
BUN/CREAT SERPL: 11.3 (ref 7–25)
BUN/CREAT SERPL: 12.5 (ref 7–25)
BUN/CREAT SERPL: 15.5 (ref 7–25)
BUN/CREAT SERPL: 19.2 (ref 7–25)
BUN/CREAT SERPL: 19.3 (ref 7–25)
BUN/CREAT SERPL: 19.7 (ref 7–25)
BUN/CREAT SERPL: 20 (ref 7–25)
BUN/CREAT SERPL: 25.4 (ref 7–25)
BUN/CREAT SERPL: 28.1 (ref 7–25)
BUN/CREAT SERPL: 7.1 (ref 7–25)
BUPRENORPHINE SERPL-MCNC: NEGATIVE NG/ML
CALCIUM SPEC-SCNC: 8.5 MG/DL (ref 8.6–10.5)
CALCIUM SPEC-SCNC: 8.6 MG/DL (ref 8.6–10.5)
CALCIUM SPEC-SCNC: 8.7 MG/DL (ref 8.6–10.5)
CALCIUM SPEC-SCNC: 8.9 MG/DL (ref 8.6–10.5)
CALCIUM SPEC-SCNC: 9 MG/DL (ref 8.6–10.5)
CALCIUM SPEC-SCNC: 9.1 MG/DL (ref 8.6–10.5)
CALCIUM SPEC-SCNC: 9.2 MG/DL (ref 8.6–10.5)
CALCIUM SPEC-SCNC: 9.4 MG/DL (ref 8.6–10.5)
CANNABINOIDS SERPL QL: NEGATIVE
CHLORIDE SERPL-SCNC: 103 MMOL/L (ref 98–107)
CHLORIDE SERPL-SCNC: 104 MMOL/L (ref 98–107)
CHLORIDE SERPL-SCNC: 89 MMOL/L (ref 98–107)
CHLORIDE SERPL-SCNC: 89 MMOL/L (ref 98–107)
CHLORIDE SERPL-SCNC: 91 MMOL/L (ref 98–107)
CHLORIDE SERPL-SCNC: 92 MMOL/L (ref 98–107)
CHLORIDE SERPL-SCNC: 96 MMOL/L (ref 98–107)
CHLORIDE SERPL-SCNC: 98 MMOL/L (ref 98–107)
CHOLEST SERPL-MCNC: 150 MG/DL (ref 0–200)
CLARITY UR: ABNORMAL
CLARITY UR: CLEAR
CO2 SERPL-SCNC: 24 MMOL/L (ref 22–29)
CO2 SERPL-SCNC: 27 MMOL/L (ref 22–29)
CO2 SERPL-SCNC: 27 MMOL/L (ref 22–29)
CO2 SERPL-SCNC: 28 MMOL/L (ref 22–29)
CO2 SERPL-SCNC: 29 MMOL/L (ref 22–29)
CO2 SERPL-SCNC: 29 MMOL/L (ref 22–29)
COCAINE UR QL: NEGATIVE
COLOR UR: ABNORMAL
COLOR UR: YELLOW
CREAT SERPL-MCNC: 0.52 MG/DL (ref 0.57–1)
CREAT SERPL-MCNC: 0.53 MG/DL (ref 0.57–1)
CREAT SERPL-MCNC: 0.56 MG/DL (ref 0.57–1)
CREAT SERPL-MCNC: 0.57 MG/DL (ref 0.57–1)
CREAT SERPL-MCNC: 0.57 MG/DL (ref 0.57–1)
CREAT SERPL-MCNC: 0.6 MG/DL (ref 0.57–1)
CREAT SERPL-MCNC: 0.61 MG/DL (ref 0.57–1)
CREAT SERPL-MCNC: 0.64 MG/DL (ref 0.57–1)
CREAT SERPL-MCNC: 0.71 MG/DL (ref 0.57–1)
CREAT SERPL-MCNC: 0.71 MG/DL (ref 0.57–1)
CRP SERPL-MCNC: 1.23 MG/DL (ref 0–0.5)
D DIMER PPP FEU-MCNC: 1.6 MG/L (FEU) (ref 0–0.5)
D-LACTATE SERPL-SCNC: 1.6 MMOL/L (ref 0.5–2)
D-LACTATE SERPL-SCNC: 1.8 MMOL/L (ref 0.5–2)
DEPRECATED RDW RBC AUTO: 43 FL (ref 37–54)
DEPRECATED RDW RBC AUTO: 43.6 FL (ref 37–54)
DEPRECATED RDW RBC AUTO: 45.1 FL (ref 37–54)
DEPRECATED RDW RBC AUTO: 47.3 FL (ref 37–54)
DEPRECATED RDW RBC AUTO: 48.1 FL (ref 37–54)
ELLIPTOCYTES BLD QL SMEAR: ABNORMAL
EOSINOPHIL # BLD AUTO: 0.05 10*3/MM3 (ref 0–0.4)
EOSINOPHIL # BLD AUTO: 0.05 10*3/MM3 (ref 0–0.4)
EOSINOPHIL # BLD AUTO: 0.2 10*3/MM3 (ref 0–0.4)
EOSINOPHIL NFR BLD AUTO: 0.8 % (ref 0.3–6.2)
EOSINOPHIL NFR BLD AUTO: 0.9 % (ref 0.3–6.2)
EOSINOPHIL NFR BLD AUTO: 3 % (ref 0.3–6.2)
ERYTHROCYTE [DISTWIDTH] IN BLOOD BY AUTOMATED COUNT: 16.4 % (ref 12.3–15.4)
ERYTHROCYTE [DISTWIDTH] IN BLOOD BY AUTOMATED COUNT: 16.4 % (ref 12.3–15.4)
ERYTHROCYTE [DISTWIDTH] IN BLOOD BY AUTOMATED COUNT: 16.6 % (ref 12.3–15.4)
ERYTHROCYTE [DISTWIDTH] IN BLOOD BY AUTOMATED COUNT: 16.9 % (ref 12.3–15.4)
ERYTHROCYTE [DISTWIDTH] IN BLOOD BY AUTOMATED COUNT: 17.2 % (ref 12.3–15.4)
FLUAV AG NPH QL: NEGATIVE
FLUBV AG NPH QL IA: NEGATIVE
GFR SERPL CREATININE-BSD FRML MDRD: 103 ML/MIN/1.73
GFR SERPL CREATININE-BSD FRML MDRD: 103 ML/MIN/1.73
GFR SERPL CREATININE-BSD FRML MDRD: 105 ML/MIN/1.73
GFR SERPL CREATININE-BSD FRML MDRD: 112 ML/MIN/1.73
GFR SERPL CREATININE-BSD FRML MDRD: 114 ML/MIN/1.73
GFR SERPL CREATININE-BSD FRML MDRD: 80 ML/MIN/1.73
GFR SERPL CREATININE-BSD FRML MDRD: 80 ML/MIN/1.73
GFR SERPL CREATININE-BSD FRML MDRD: 90 ML/MIN/1.73
GFR SERPL CREATININE-BSD FRML MDRD: 95 ML/MIN/1.73
GFR SERPL CREATININE-BSD FRML MDRD: 97 ML/MIN/1.73
GLOBULIN UR ELPH-MCNC: 2.8 GM/DL
GLOBULIN UR ELPH-MCNC: 3 GM/DL
GLOBULIN UR ELPH-MCNC: 3.3 GM/DL
GLUCOSE SERPL-MCNC: 107 MG/DL (ref 65–99)
GLUCOSE SERPL-MCNC: 109 MG/DL (ref 65–99)
GLUCOSE SERPL-MCNC: 112 MG/DL (ref 65–99)
GLUCOSE SERPL-MCNC: 118 MG/DL (ref 65–99)
GLUCOSE SERPL-MCNC: 140 MG/DL (ref 65–99)
GLUCOSE SERPL-MCNC: 149 MG/DL (ref 65–99)
GLUCOSE SERPL-MCNC: 169 MG/DL (ref 65–99)
GLUCOSE SERPL-MCNC: 77 MG/DL (ref 65–99)
GLUCOSE SERPL-MCNC: 92 MG/DL (ref 65–99)
GLUCOSE SERPL-MCNC: 97 MG/DL (ref 65–99)
GLUCOSE UR STRIP-MCNC: NEGATIVE MG/DL
GLUCOSE UR STRIP-MCNC: NEGATIVE MG/DL
HBA1C MFR BLD: 5.7 % (ref 4.8–5.6)
HCT VFR BLD AUTO: 41.6 % (ref 34–46.6)
HCT VFR BLD AUTO: 42.9 % (ref 34–46.6)
HCT VFR BLD AUTO: 42.9 % (ref 34–46.6)
HCT VFR BLD AUTO: 43.5 % (ref 34–46.6)
HCT VFR BLD AUTO: 43.6 % (ref 34–46.6)
HDLC SERPL-MCNC: 70 MG/DL (ref 40–60)
HGB BLD-MCNC: 12.6 G/DL (ref 12–15.9)
HGB BLD-MCNC: 13 G/DL (ref 12–15.9)
HGB BLD-MCNC: 13.2 G/DL (ref 12–15.9)
HGB BLD-MCNC: 13.4 G/DL (ref 12–15.9)
HGB BLD-MCNC: 13.5 G/DL (ref 12–15.9)
HGB UR QL STRIP.AUTO: ABNORMAL
HGB UR QL STRIP.AUTO: NEGATIVE
HYALINE CASTS UR QL AUTO: ABNORMAL /LPF
HYALINE CASTS UR QL AUTO: ABNORMAL /LPF
IMM GRANULOCYTES # BLD AUTO: 0.01 10*3/MM3 (ref 0–0.05)
IMM GRANULOCYTES # BLD AUTO: 0.02 10*3/MM3 (ref 0–0.05)
IMM GRANULOCYTES # BLD AUTO: 0.03 10*3/MM3 (ref 0–0.05)
IMM GRANULOCYTES NFR BLD AUTO: 0.2 % (ref 0–0.5)
IMM GRANULOCYTES NFR BLD AUTO: 0.3 % (ref 0–0.5)
IMM GRANULOCYTES NFR BLD AUTO: 0.5 % (ref 0–0.5)
INR PPP: 1.1 (ref 0.91–1.09)
INR PPP: 1.2 (ref 0.91–1.09)
IRON 24H UR-MRATE: 22 MCG/DL (ref 37–145)
IRON SATN MFR SERPL: 5 % (ref 20–50)
KETONES UR QL STRIP: ABNORMAL
KETONES UR QL STRIP: NEGATIVE
LDLC SERPL CALC-MCNC: 66 MG/DL (ref 0–100)
LDLC/HDLC SERPL: 0.95 {RATIO}
LEUKOCYTE ESTERASE UR QL STRIP.AUTO: ABNORMAL
LEUKOCYTE ESTERASE UR QL STRIP.AUTO: NEGATIVE
LIPASE SERPL-CCNC: 16 U/L (ref 13–60)
LYMPHOCYTES # BLD AUTO: 0.86 10*3/MM3 (ref 0.7–3.1)
LYMPHOCYTES # BLD AUTO: 0.93 10*3/MM3 (ref 0.7–3.1)
LYMPHOCYTES # BLD AUTO: 1.08 10*3/MM3 (ref 0.7–3.1)
LYMPHOCYTES # BLD MANUAL: 0.56 10*3/MM3 (ref 0.7–3.1)
LYMPHOCYTES NFR BLD AUTO: 13.8 % (ref 19.6–45.3)
LYMPHOCYTES NFR BLD AUTO: 16 % (ref 19.6–45.3)
LYMPHOCYTES NFR BLD AUTO: 16.3 % (ref 19.6–45.3)
LYMPHOCYTES NFR BLD MANUAL: 7.1 % (ref 5–12)
MAGNESIUM SERPL-MCNC: 1.9 MG/DL (ref 1.6–2.4)
MCH RBC QN AUTO: 23 PG (ref 26.6–33)
MCH RBC QN AUTO: 23.3 PG (ref 26.6–33)
MCH RBC QN AUTO: 23.5 PG (ref 26.6–33)
MCH RBC QN AUTO: 24 PG (ref 26.6–33)
MCH RBC QN AUTO: 24.3 PG (ref 26.6–33)
MCHC RBC AUTO-ENTMCNC: 29.8 G/DL (ref 31.5–35.7)
MCHC RBC AUTO-ENTMCNC: 30.3 G/DL (ref 31.5–35.7)
MCHC RBC AUTO-ENTMCNC: 30.3 G/DL (ref 31.5–35.7)
MCHC RBC AUTO-ENTMCNC: 31.2 G/DL (ref 31.5–35.7)
MCHC RBC AUTO-ENTMCNC: 31.5 G/DL (ref 31.5–35.7)
MCV RBC AUTO: 74.5 FL (ref 79–97)
MCV RBC AUTO: 75.7 FL (ref 79–97)
MCV RBC AUTO: 77.2 FL (ref 79–97)
MCV RBC AUTO: 78.7 FL (ref 79–97)
MCV RBC AUTO: 79.1 FL (ref 79–97)
METHADONE UR QL SCN: NEGATIVE
MICROCYTES BLD QL: ABNORMAL
MONOCYTES # BLD AUTO: 0.66 10*3/MM3 (ref 0.1–0.9)
MONOCYTES # BLD AUTO: 0.7 10*3/MM3 (ref 0.1–0.9)
MONOCYTES # BLD AUTO: 0.73 10*3/MM3 (ref 0.1–0.9)
MONOCYTES # BLD: 0.49 10*3/MM3 (ref 0.1–0.9)
MONOCYTES NFR BLD AUTO: 10.6 % (ref 5–12)
MONOCYTES NFR BLD AUTO: 11.3 % (ref 5–12)
MONOCYTES NFR BLD AUTO: 11.7 % (ref 5–12)
NEUTROPHILS # BLD AUTO: 5.89 10*3/MM3 (ref 1.7–7)
NEUTROPHILS NFR BLD AUTO: 4.13 10*3/MM3 (ref 1.7–7)
NEUTROPHILS NFR BLD AUTO: 4.57 10*3/MM3 (ref 1.7–7)
NEUTROPHILS NFR BLD AUTO: 4.59 10*3/MM3 (ref 1.7–7)
NEUTROPHILS NFR BLD AUTO: 69.1 % (ref 42.7–76)
NEUTROPHILS NFR BLD AUTO: 71 % (ref 42.7–76)
NEUTROPHILS NFR BLD AUTO: 73 % (ref 42.7–76)
NEUTROPHILS NFR BLD MANUAL: 84.7 % (ref 42.7–76)
NITRITE UR QL STRIP: NEGATIVE
NITRITE UR QL STRIP: POSITIVE
NRBC BLD AUTO-RTO: 0 /100 WBC (ref 0–0.2)
NT-PROBNP SERPL-MCNC: 2331 PG/ML (ref 0–1800)
OPIATES UR QL: NEGATIVE
OSMOLALITY UR: 426 MOSM/KG (ref 50–1400)
OVALOCYTES BLD QL SMEAR: ABNORMAL
OXYCODONE UR QL SCN: POSITIVE
PCP UR QL SCN: NEGATIVE
PH UR STRIP.AUTO: 7.5 [PH] (ref 5–8)
PH UR STRIP.AUTO: <=5 [PH] (ref 5–8)
PLAT MORPH BLD: NORMAL
PLATELET # BLD AUTO: 284 10*3/MM3 (ref 140–450)
PLATELET # BLD AUTO: 297 10*3/MM3 (ref 140–450)
PLATELET # BLD AUTO: 298 10*3/MM3 (ref 140–450)
PLATELET # BLD AUTO: 300 10*3/MM3 (ref 140–450)
PLATELET # BLD AUTO: 332 10*3/MM3 (ref 140–450)
PMV BLD AUTO: 9.4 FL (ref 6–12)
PMV BLD AUTO: 9.4 FL (ref 6–12)
PMV BLD AUTO: 9.5 FL (ref 6–12)
PMV BLD AUTO: 9.6 FL (ref 6–12)
PMV BLD AUTO: 9.9 FL (ref 6–12)
POIKILOCYTOSIS BLD QL SMEAR: ABNORMAL
POLYCHROMASIA BLD QL SMEAR: ABNORMAL
POTASSIUM SERPL-SCNC: 2.9 MMOL/L (ref 3.5–5.2)
POTASSIUM SERPL-SCNC: 3.1 MMOL/L (ref 3.5–5.2)
POTASSIUM SERPL-SCNC: 3.6 MMOL/L (ref 3.5–5.2)
POTASSIUM SERPL-SCNC: 3.8 MMOL/L (ref 3.5–5.2)
POTASSIUM SERPL-SCNC: 4.2 MMOL/L (ref 3.5–5.2)
POTASSIUM SERPL-SCNC: 4.3 MMOL/L (ref 3.5–5.2)
POTASSIUM SERPL-SCNC: 4.5 MMOL/L (ref 3.5–5.2)
POTASSIUM SERPL-SCNC: 5.3 MMOL/L (ref 3.5–5.2)
PROCALCITONIN SERPL-MCNC: 0.1 NG/ML (ref 0–0.25)
PROCALCITONIN SERPL-MCNC: 0.15 NG/ML (ref 0–0.25)
PROPOXYPH UR QL: NEGATIVE
PROT SERPL-MCNC: 6.5 G/DL (ref 6–8.5)
PROT SERPL-MCNC: 7 G/DL (ref 6–8.5)
PROT SERPL-MCNC: 7.1 G/DL (ref 6–8.5)
PROT UR QL STRIP: ABNORMAL
PROT UR QL STRIP: ABNORMAL
PROTHROMBIN TIME: 13.8 SECONDS (ref 11.9–14.6)
PROTHROMBIN TIME: 14.7 SECONDS (ref 11.9–14.6)
QT INTERVAL: 424 MS
QT INTERVAL: 432 MS
QTC INTERVAL: 510 MS
QTC INTERVAL: 515 MS
RBC # BLD AUTO: 5.26 10*6/MM3 (ref 3.77–5.28)
RBC # BLD AUTO: 5.54 10*6/MM3 (ref 3.77–5.28)
RBC # BLD AUTO: 5.56 10*6/MM3 (ref 3.77–5.28)
RBC # BLD AUTO: 5.75 10*6/MM3 (ref 3.77–5.28)
RBC # BLD AUTO: 5.76 10*6/MM3 (ref 3.77–5.28)
RBC # UR STRIP: ABNORMAL /HPF
RBC # UR STRIP: ABNORMAL /HPF
REF LAB TEST METHOD: ABNORMAL
REF LAB TEST METHOD: ABNORMAL
SARS-COV-2 RNA PNL SPEC NAA+PROBE: DETECTED
SARS-COV-2 RNA PNL SPEC NAA+PROBE: NOT DETECTED
SODIUM SERPL-SCNC: 126 MMOL/L (ref 136–145)
SODIUM SERPL-SCNC: 127 MMOL/L (ref 136–145)
SODIUM SERPL-SCNC: 129 MMOL/L (ref 136–145)
SODIUM SERPL-SCNC: 129 MMOL/L (ref 136–145)
SODIUM SERPL-SCNC: 131 MMOL/L (ref 136–145)
SODIUM SERPL-SCNC: 132 MMOL/L (ref 136–145)
SODIUM SERPL-SCNC: 134 MMOL/L (ref 136–145)
SODIUM SERPL-SCNC: 135 MMOL/L (ref 136–145)
SODIUM SERPL-SCNC: 143 MMOL/L (ref 136–145)
SODIUM SERPL-SCNC: 143 MMOL/L (ref 136–145)
SODIUM UR-SCNC: 27 MMOL/L
SP GR UR STRIP: 1.01 (ref 1–1.03)
SP GR UR STRIP: >1.03 (ref 1–1.03)
SQUAMOUS #/AREA URNS HPF: ABNORMAL /HPF
SQUAMOUS #/AREA URNS HPF: ABNORMAL /HPF
STOMATOCYTES BLD QL SMEAR: ABNORMAL
TARGETS BLD QL SMEAR: ABNORMAL
TIBC SERPL-MCNC: 432 MCG/DL (ref 298–536)
TRANSFERRIN SERPL-MCNC: 290 MG/DL (ref 200–360)
TRICYCLICS UR QL SCN: NEGATIVE
TRIGL SERPL-MCNC: 69 MG/DL (ref 0–150)
TROPONIN T SERPL-MCNC: <0.01 NG/ML (ref 0–0.03)
TSH SERPL DL<=0.05 MIU/L-ACNC: 1.18 UIU/ML (ref 0.27–4.2)
UROBILINOGEN UR QL STRIP: ABNORMAL
UROBILINOGEN UR QL STRIP: ABNORMAL
VARIANT LYMPHS NFR BLD MANUAL: 1 % (ref 0–5)
VARIANT LYMPHS NFR BLD MANUAL: 7.1 % (ref 19.6–45.3)
VLDLC SERPL-MCNC: 14 MG/DL (ref 5–40)
WBC # UR STRIP: ABNORMAL /HPF
WBC # UR STRIP: ABNORMAL /HPF
WBC MORPH BLD: NORMAL
WBC NRBC COR # BLD: 5.82 10*3/MM3 (ref 3.4–10.8)
WBC NRBC COR # BLD: 6.25 10*3/MM3 (ref 3.4–10.8)
WBC NRBC COR # BLD: 6.63 10*3/MM3 (ref 3.4–10.8)
WBC NRBC COR # BLD: 6.95 10*3/MM3 (ref 3.4–10.8)
WBC NRBC COR # BLD: 7.24 10*3/MM3 (ref 3.4–10.8)
WHOLE BLOOD HOLD SPECIMEN: NORMAL

## 2022-01-01 PROCEDURE — 81001 URINALYSIS AUTO W/SCOPE: CPT | Performed by: NURSE PRACTITIONER

## 2022-01-01 PROCEDURE — 94799 UNLISTED PULMONARY SVC/PX: CPT

## 2022-01-01 PROCEDURE — 85007 BL SMEAR W/DIFF WBC COUNT: CPT | Performed by: EMERGENCY MEDICINE

## 2022-01-01 PROCEDURE — 87040 BLOOD CULTURE FOR BACTERIA: CPT | Performed by: EMERGENCY MEDICINE

## 2022-01-01 PROCEDURE — G0378 HOSPITAL OBSERVATION PER HR: HCPCS

## 2022-01-01 PROCEDURE — 84484 ASSAY OF TROPONIN QUANT: CPT | Performed by: NURSE PRACTITIONER

## 2022-01-01 PROCEDURE — 80048 BASIC METABOLIC PNL TOTAL CA: CPT | Performed by: FAMILY MEDICINE

## 2022-01-01 PROCEDURE — 96366 THER/PROPH/DIAG IV INF ADDON: CPT

## 2022-01-01 PROCEDURE — 87077 CULTURE AEROBIC IDENTIFY: CPT | Performed by: NURSE PRACTITIONER

## 2022-01-01 PROCEDURE — 97530 THERAPEUTIC ACTIVITIES: CPT

## 2022-01-01 PROCEDURE — 92610 EVALUATE SWALLOWING FUNCTION: CPT

## 2022-01-01 PROCEDURE — 93005 ELECTROCARDIOGRAM TRACING: CPT | Performed by: NURSE PRACTITIONER

## 2022-01-01 PROCEDURE — 85025 COMPLETE CBC W/AUTO DIFF WBC: CPT | Performed by: FAMILY MEDICINE

## 2022-01-01 PROCEDURE — 25010000002 ENOXAPARIN PER 10 MG: Performed by: INTERNAL MEDICINE

## 2022-01-01 PROCEDURE — 85610 PROTHROMBIN TIME: CPT | Performed by: NURSE PRACTITIONER

## 2022-01-01 PROCEDURE — 96372 THER/PROPH/DIAG INJ SC/IM: CPT

## 2022-01-01 PROCEDURE — 25010000002 ZIPRASIDONE MESYLATE PER 10 MG: Performed by: FAMILY MEDICINE

## 2022-01-01 PROCEDURE — 87186 SC STD MICRODIL/AGAR DIL: CPT | Performed by: NURSE PRACTITIONER

## 2022-01-01 PROCEDURE — 0 IOPAMIDOL PER 1 ML: Performed by: NURSE PRACTITIONER

## 2022-01-01 PROCEDURE — 71045 X-RAY EXAM CHEST 1 VIEW: CPT

## 2022-01-01 PROCEDURE — 87635 SARS-COV-2 COVID-19 AMP PRB: CPT | Performed by: FAMILY MEDICINE

## 2022-01-01 PROCEDURE — 85027 COMPLETE CBC AUTOMATED: CPT | Performed by: FAMILY MEDICINE

## 2022-01-01 PROCEDURE — 93010 ELECTROCARDIOGRAM REPORT: CPT | Performed by: INTERNAL MEDICINE

## 2022-01-01 PROCEDURE — 36415 COLL VENOUS BLD VENIPUNCTURE: CPT | Performed by: FAMILY MEDICINE

## 2022-01-01 PROCEDURE — 83540 ASSAY OF IRON: CPT | Performed by: INTERNAL MEDICINE

## 2022-01-01 PROCEDURE — C9803 HOPD COVID-19 SPEC COLLECT: HCPCS

## 2022-01-01 PROCEDURE — 83605 ASSAY OF LACTIC ACID: CPT | Performed by: NURSE PRACTITIONER

## 2022-01-01 PROCEDURE — 97116 GAIT TRAINING THERAPY: CPT

## 2022-01-01 PROCEDURE — 83036 HEMOGLOBIN GLYCOSYLATED A1C: CPT | Performed by: FAMILY MEDICINE

## 2022-01-01 PROCEDURE — 87804 INFLUENZA ASSAY W/OPTIC: CPT | Performed by: NURSE PRACTITIONER

## 2022-01-01 PROCEDURE — 97166 OT EVAL MOD COMPLEX 45 MIN: CPT | Performed by: OCCUPATIONAL THERAPIST

## 2022-01-01 PROCEDURE — 25010000002 LEVOFLOXACIN PER 250 MG: Performed by: NURSE PRACTITIONER

## 2022-01-01 PROCEDURE — 93970 EXTREMITY STUDY: CPT | Performed by: SURGERY

## 2022-01-01 PROCEDURE — 85730 THROMBOPLASTIN TIME PARTIAL: CPT | Performed by: EMERGENCY MEDICINE

## 2022-01-01 PROCEDURE — 97110 THERAPEUTIC EXERCISES: CPT

## 2022-01-01 PROCEDURE — 87086 URINE CULTURE/COLONY COUNT: CPT | Performed by: NURSE PRACTITIONER

## 2022-01-01 PROCEDURE — 83735 ASSAY OF MAGNESIUM: CPT | Performed by: FAMILY MEDICINE

## 2022-01-01 PROCEDURE — 80061 LIPID PANEL: CPT | Performed by: FAMILY MEDICINE

## 2022-01-01 PROCEDURE — 85025 COMPLETE CBC W/AUTO DIFF WBC: CPT | Performed by: EMERGENCY MEDICINE

## 2022-01-01 PROCEDURE — 93010 ELECTROCARDIOGRAM REPORT: CPT | Performed by: EMERGENCY MEDICINE

## 2022-01-01 PROCEDURE — 84466 ASSAY OF TRANSFERRIN: CPT | Performed by: INTERNAL MEDICINE

## 2022-01-01 PROCEDURE — 70450 CT HEAD/BRAIN W/O DYE: CPT

## 2022-01-01 PROCEDURE — 85379 FIBRIN DEGRADATION QUANT: CPT | Performed by: NURSE PRACTITIONER

## 2022-01-01 PROCEDURE — 99284 EMERGENCY DEPT VISIT MOD MDM: CPT

## 2022-01-01 PROCEDURE — 85025 COMPLETE CBC W/AUTO DIFF WBC: CPT | Performed by: INTERNAL MEDICINE

## 2022-01-01 PROCEDURE — 97161 PT EVAL LOW COMPLEX 20 MIN: CPT | Performed by: PHYSICAL THERAPIST

## 2022-01-01 PROCEDURE — 83880 ASSAY OF NATRIURETIC PEPTIDE: CPT | Performed by: EMERGENCY MEDICINE

## 2022-01-01 PROCEDURE — 36415 COLL VENOUS BLD VENIPUNCTURE: CPT

## 2022-01-01 PROCEDURE — 97162 PT EVAL MOD COMPLEX 30 MIN: CPT

## 2022-01-01 PROCEDURE — 25010000002 MAGNESIUM SULFATE 2 GM/50ML SOLUTION: Performed by: EMERGENCY MEDICINE

## 2022-01-01 PROCEDURE — 85025 COMPLETE CBC W/AUTO DIFF WBC: CPT | Performed by: NURSE PRACTITIONER

## 2022-01-01 PROCEDURE — 80053 COMPREHEN METABOLIC PANEL: CPT | Performed by: EMERGENCY MEDICINE

## 2022-01-01 PROCEDURE — 87635 SARS-COV-2 COVID-19 AMP PRB: CPT | Performed by: EMERGENCY MEDICINE

## 2022-01-01 PROCEDURE — 97535 SELF CARE MNGMENT TRAINING: CPT | Performed by: OCCUPATIONAL THERAPIST

## 2022-01-01 PROCEDURE — 93005 ELECTROCARDIOGRAM TRACING: CPT | Performed by: EMERGENCY MEDICINE

## 2022-01-01 PROCEDURE — 71275 CT ANGIOGRAPHY CHEST: CPT

## 2022-01-01 PROCEDURE — 93970 EXTREMITY STUDY: CPT

## 2022-01-01 PROCEDURE — 99213 OFFICE O/P EST LOW 20 MIN: CPT | Performed by: INTERNAL MEDICINE

## 2022-01-01 PROCEDURE — 97535 SELF CARE MNGMENT TRAINING: CPT

## 2022-01-01 PROCEDURE — 83605 ASSAY OF LACTIC ACID: CPT | Performed by: EMERGENCY MEDICINE

## 2022-01-01 PROCEDURE — 83935 ASSAY OF URINE OSMOLALITY: CPT | Performed by: INTERNAL MEDICINE

## 2022-01-01 PROCEDURE — 96365 THER/PROPH/DIAG IV INF INIT: CPT

## 2022-01-01 PROCEDURE — 25010000002 LEVOFLOXACIN PER 250 MG: Performed by: INTERNAL MEDICINE

## 2022-01-01 PROCEDURE — 84145 PROCALCITONIN (PCT): CPT | Performed by: NURSE PRACTITIONER

## 2022-01-01 PROCEDURE — 84145 PROCALCITONIN (PCT): CPT | Performed by: EMERGENCY MEDICINE

## 2022-01-01 PROCEDURE — 85610 PROTHROMBIN TIME: CPT | Performed by: EMERGENCY MEDICINE

## 2022-01-01 PROCEDURE — 87040 BLOOD CULTURE FOR BACTERIA: CPT | Performed by: NURSE PRACTITIONER

## 2022-01-01 PROCEDURE — 72125 CT NECK SPINE W/O DYE: CPT

## 2022-01-01 PROCEDURE — 84484 ASSAY OF TROPONIN QUANT: CPT | Performed by: EMERGENCY MEDICINE

## 2022-01-01 PROCEDURE — 81001 URINALYSIS AUTO W/SCOPE: CPT | Performed by: EMERGENCY MEDICINE

## 2022-01-01 PROCEDURE — 80053 COMPREHEN METABOLIC PANEL: CPT | Performed by: INTERNAL MEDICINE

## 2022-01-01 PROCEDURE — 84300 ASSAY OF URINE SODIUM: CPT | Performed by: INTERNAL MEDICINE

## 2022-01-01 PROCEDURE — 80306 DRUG TEST PRSMV INSTRMNT: CPT | Performed by: EMERGENCY MEDICINE

## 2022-01-01 PROCEDURE — 87635 SARS-COV-2 COVID-19 AMP PRB: CPT | Performed by: NURSE PRACTITIONER

## 2022-01-01 PROCEDURE — 83690 ASSAY OF LIPASE: CPT | Performed by: NURSE PRACTITIONER

## 2022-01-01 PROCEDURE — 80053 COMPREHEN METABOLIC PANEL: CPT | Performed by: NURSE PRACTITIONER

## 2022-01-01 PROCEDURE — 86140 C-REACTIVE PROTEIN: CPT | Performed by: EMERGENCY MEDICINE

## 2022-01-01 PROCEDURE — 99214 OFFICE O/P EST MOD 30 MIN: CPT | Performed by: INTERNAL MEDICINE

## 2022-01-01 PROCEDURE — 84484 ASSAY OF TROPONIN QUANT: CPT | Performed by: INTERNAL MEDICINE

## 2022-01-01 PROCEDURE — 87635 SARS-COV-2 COVID-19 AMP PRB: CPT | Performed by: INTERNAL MEDICINE

## 2022-01-01 PROCEDURE — 84443 ASSAY THYROID STIM HORMONE: CPT | Performed by: FAMILY MEDICINE

## 2022-01-01 RX ORDER — VALSARTAN 80 MG/1
80 TABLET ORAL DAILY
Start: 2022-01-01

## 2022-01-01 RX ORDER — SODIUM CHLORIDE 0.9 % (FLUSH) 0.9 %
10 SYRINGE (ML) INJECTION AS NEEDED
Status: DISCONTINUED | OUTPATIENT
Start: 2022-01-01 | End: 2022-01-01 | Stop reason: HOSPADM

## 2022-01-01 RX ORDER — DOCUSATE SODIUM 100 MG/1
100 CAPSULE, LIQUID FILLED ORAL 2 TIMES DAILY PRN
Status: DISCONTINUED | OUTPATIENT
Start: 2022-01-01 | End: 2022-01-01 | Stop reason: HOSPADM

## 2022-01-01 RX ORDER — ZIPRASIDONE MESYLATE 20 MG/ML
10 INJECTION, POWDER, LYOPHILIZED, FOR SOLUTION INTRAMUSCULAR ONCE
Status: COMPLETED | OUTPATIENT
Start: 2022-01-01 | End: 2022-01-01

## 2022-01-01 RX ORDER — DOCUSATE SODIUM 100 MG/1
100 CAPSULE, LIQUID FILLED ORAL 2 TIMES DAILY
Status: DISCONTINUED | OUTPATIENT
Start: 2022-01-01 | End: 2022-01-01 | Stop reason: HOSPADM

## 2022-01-01 RX ORDER — ONDANSETRON 4 MG/1
4 TABLET, FILM COATED ORAL EVERY 8 HOURS PRN
Status: DISCONTINUED | OUTPATIENT
Start: 2022-01-01 | End: 2022-01-01 | Stop reason: HOSPADM

## 2022-01-01 RX ORDER — FAMOTIDINE 20 MG/1
20 TABLET, FILM COATED ORAL
Status: DISCONTINUED | OUTPATIENT
Start: 2022-01-01 | End: 2022-01-01 | Stop reason: HOSPADM

## 2022-01-01 RX ORDER — OXYCODONE HYDROCHLORIDE AND ACETAMINOPHEN 5; 325 MG/1; MG/1
1 TABLET ORAL EVERY 6 HOURS PRN
Status: DISCONTINUED | OUTPATIENT
Start: 2022-01-01 | End: 2022-01-01 | Stop reason: HOSPADM

## 2022-01-01 RX ORDER — CEFDINIR 300 MG/1
300 CAPSULE ORAL EVERY 12 HOURS SCHEDULED
Qty: 12 CAPSULE | Refills: 0
Start: 2022-01-01 | End: 2022-01-01

## 2022-01-01 RX ORDER — SODIUM CHLORIDE, SODIUM LACTATE, POTASSIUM CHLORIDE, CALCIUM CHLORIDE 600; 310; 30; 20 MG/100ML; MG/100ML; MG/100ML; MG/100ML
50 INJECTION, SOLUTION INTRAVENOUS CONTINUOUS
Status: DISCONTINUED | OUTPATIENT
Start: 2022-01-01 | End: 2022-01-01

## 2022-01-01 RX ORDER — LEVOFLOXACIN 5 MG/ML
750 INJECTION, SOLUTION INTRAVENOUS ONCE
Status: COMPLETED | OUTPATIENT
Start: 2022-01-01 | End: 2022-01-01

## 2022-01-01 RX ORDER — POTASSIUM CHLORIDE 1.5 G/1.77G
40 POWDER, FOR SOLUTION ORAL 2 TIMES DAILY
Status: DISCONTINUED | OUTPATIENT
Start: 2022-01-01 | End: 2022-01-01

## 2022-01-01 RX ORDER — ONDANSETRON 2 MG/ML
4 INJECTION INTRAMUSCULAR; INTRAVENOUS EVERY 6 HOURS PRN
Status: DISCONTINUED | OUTPATIENT
Start: 2022-01-01 | End: 2022-01-01

## 2022-01-01 RX ORDER — SODIUM CHLORIDE 0.9 % (FLUSH) 0.9 %
10 SYRINGE (ML) INJECTION EVERY 12 HOURS SCHEDULED
Status: DISCONTINUED | OUTPATIENT
Start: 2022-01-01 | End: 2022-01-01 | Stop reason: HOSPADM

## 2022-01-01 RX ORDER — OXYCODONE AND ACETAMINOPHEN 10; 325 MG/1; MG/1
1 TABLET ORAL EVERY 8 HOURS PRN
Qty: 6 TABLET | Refills: 0 | Status: SHIPPED | OUTPATIENT
Start: 2022-01-01

## 2022-01-01 RX ORDER — POTASSIUM CHLORIDE 14.9 MG/ML
20 INJECTION INTRAVENOUS ONCE
Status: DISCONTINUED | OUTPATIENT
Start: 2022-01-01 | End: 2022-01-01

## 2022-01-01 RX ORDER — LEVOFLOXACIN 250 MG/1
250 TABLET ORAL EVERY 24 HOURS
Status: DISCONTINUED | OUTPATIENT
Start: 2022-01-01 | End: 2022-01-01

## 2022-01-01 RX ORDER — TRIAMTERENE AND HYDROCHLOROTHIAZIDE 75; 50 MG/1; MG/1
1 TABLET ORAL DAILY
COMMUNITY

## 2022-01-01 RX ORDER — LEVOTHYROXINE SODIUM 0.07 MG/1
75 TABLET ORAL
Status: DISCONTINUED | OUTPATIENT
Start: 2022-01-01 | End: 2022-01-01 | Stop reason: HOSPADM

## 2022-01-01 RX ORDER — CEFDINIR 300 MG/1
300 CAPSULE ORAL EVERY 12 HOURS SCHEDULED
Status: DISCONTINUED | OUTPATIENT
Start: 2022-01-01 | End: 2022-01-01 | Stop reason: HOSPADM

## 2022-01-01 RX ORDER — ACETAMINOPHEN 325 MG/1
975 TABLET ORAL EVERY 8 HOURS
Status: DISCONTINUED | OUTPATIENT
Start: 2022-01-01 | End: 2022-01-01

## 2022-01-01 RX ORDER — LEVOFLOXACIN 5 MG/ML
500 INJECTION, SOLUTION INTRAVENOUS EVERY 24 HOURS
Status: DISCONTINUED | OUTPATIENT
Start: 2022-01-01 | End: 2022-01-01

## 2022-01-01 RX ORDER — ACETAMINOPHEN 325 MG/1
650 TABLET ORAL EVERY 8 HOURS PRN
Status: DISCONTINUED | OUTPATIENT
Start: 2022-01-01 | End: 2022-01-01 | Stop reason: HOSPADM

## 2022-01-01 RX ORDER — OXYCODONE HYDROCHLORIDE AND ACETAMINOPHEN 5; 325 MG/1; MG/1
1 TABLET ORAL EVERY 6 HOURS PRN
Qty: 12 TABLET | Refills: 0 | Status: ON HOLD | OUTPATIENT
Start: 2022-01-01 | End: 2022-01-01

## 2022-01-01 RX ORDER — ACETAMINOPHEN 325 MG/1
975 TABLET ORAL EVERY 8 HOURS SCHEDULED
Status: DISCONTINUED | OUTPATIENT
Start: 2022-01-01 | End: 2022-01-01 | Stop reason: HOSPADM

## 2022-01-01 RX ORDER — VALSARTAN 80 MG/1
80 TABLET ORAL DAILY
Status: DISCONTINUED | OUTPATIENT
Start: 2022-01-01 | End: 2022-01-01 | Stop reason: HOSPADM

## 2022-01-01 RX ORDER — OXYCODONE HYDROCHLORIDE AND ACETAMINOPHEN 5; 325 MG/1; MG/1
1 TABLET ORAL EVERY 4 HOURS PRN
Status: DISCONTINUED | OUTPATIENT
Start: 2022-01-01 | End: 2022-01-01 | Stop reason: HOSPADM

## 2022-01-01 RX ORDER — MAGNESIUM SULFATE HEPTAHYDRATE 40 MG/ML
2 INJECTION, SOLUTION INTRAVENOUS ONCE
Status: COMPLETED | OUTPATIENT
Start: 2022-01-01 | End: 2022-01-01

## 2022-01-01 RX ORDER — ACETAMINOPHEN 500 MG
1000 TABLET ORAL ONCE
Status: COMPLETED | OUTPATIENT
Start: 2022-01-01 | End: 2022-01-01

## 2022-01-01 RX ORDER — OXYCODONE AND ACETAMINOPHEN 10; 325 MG/1; MG/1
1 TABLET ORAL EVERY 8 HOURS PRN
Status: ON HOLD | COMMUNITY
End: 2022-01-01 | Stop reason: SDUPTHER

## 2022-01-01 RX ADMIN — ENOXAPARIN SODIUM 80 MG: 80 INJECTION SUBCUTANEOUS at 21:17

## 2022-01-01 RX ADMIN — ENOXAPARIN SODIUM 80 MG: 80 INJECTION SUBCUTANEOUS at 17:57

## 2022-01-01 RX ADMIN — ACETAMINOPHEN 975 MG: 325 TABLET, FILM COATED ORAL at 13:40

## 2022-01-01 RX ADMIN — FAMOTIDINE 20 MG: 20 TABLET, FILM COATED ORAL at 09:46

## 2022-01-01 RX ADMIN — FAMOTIDINE 20 MG: 20 TABLET, FILM COATED ORAL at 16:45

## 2022-01-01 RX ADMIN — SODIUM CHLORIDE, PRESERVATIVE FREE 10 ML: 5 INJECTION INTRAVENOUS at 08:38

## 2022-01-01 RX ADMIN — LEVOTHYROXINE SODIUM 75 MCG: 75 TABLET ORAL at 05:41

## 2022-01-01 RX ADMIN — DOCUSATE SODIUM 100 MG: 100 CAPSULE, LIQUID FILLED ORAL at 08:38

## 2022-01-01 RX ADMIN — LEVOFLOXACIN 500 MG: 5 INJECTION, SOLUTION INTRAVENOUS at 21:17

## 2022-01-01 RX ADMIN — OXYCODONE HYDROCHLORIDE AND ACETAMINOPHEN 1 TABLET: 5; 325 TABLET ORAL at 16:45

## 2022-01-01 RX ADMIN — ACETAMINOPHEN 975 MG: 325 TABLET, FILM COATED ORAL at 16:05

## 2022-01-01 RX ADMIN — ACETAMINOPHEN 1000 MG: 500 TABLET, FILM COATED ORAL at 19:23

## 2022-01-01 RX ADMIN — FAMOTIDINE 20 MG: 20 TABLET, FILM COATED ORAL at 08:38

## 2022-01-01 RX ADMIN — OXYCODONE HYDROCHLORIDE AND ACETAMINOPHEN 1 TABLET: 5; 325 TABLET ORAL at 04:11

## 2022-01-01 RX ADMIN — SODIUM CHLORIDE, POTASSIUM CHLORIDE, SODIUM LACTATE AND CALCIUM CHLORIDE 50 ML/HR: 600; 310; 30; 20 INJECTION, SOLUTION INTRAVENOUS at 21:35

## 2022-01-01 RX ADMIN — FAMOTIDINE 20 MG: 20 TABLET, FILM COATED ORAL at 17:26

## 2022-01-01 RX ADMIN — APIXABAN 5 MG: 5 TABLET, FILM COATED ORAL at 06:16

## 2022-01-01 RX ADMIN — LEVOTHYROXINE SODIUM 75 MCG: 75 TABLET ORAL at 06:47

## 2022-01-01 RX ADMIN — MAGNESIUM SULFATE HEPTAHYDRATE 2 G: 2 INJECTION, SOLUTION INTRAVENOUS at 12:50

## 2022-01-01 RX ADMIN — FAMOTIDINE 20 MG: 20 TABLET, FILM COATED ORAL at 18:55

## 2022-01-01 RX ADMIN — OXYCODONE HYDROCHLORIDE AND ACETAMINOPHEN 1 TABLET: 5; 325 TABLET ORAL at 16:40

## 2022-01-01 RX ADMIN — DOCUSATE SODIUM 100 MG: 100 CAPSULE, LIQUID FILLED ORAL at 22:07

## 2022-01-01 RX ADMIN — FAMOTIDINE 20 MG: 20 TABLET, FILM COATED ORAL at 17:04

## 2022-01-01 RX ADMIN — POTASSIUM CHLORIDE 40 MEQ: 1.5 POWDER, FOR SOLUTION ORAL at 12:18

## 2022-01-01 RX ADMIN — ACETAMINOPHEN 325 MG: 325 TABLET ORAL at 05:41

## 2022-01-01 RX ADMIN — ACETAMINOPHEN 975 MG: 325 TABLET, FILM COATED ORAL at 15:55

## 2022-01-01 RX ADMIN — ACETAMINOPHEN 975 MG: 325 TABLET ORAL at 17:47

## 2022-01-01 RX ADMIN — APIXABAN 5 MG: 5 TABLET, FILM COATED ORAL at 18:38

## 2022-01-01 RX ADMIN — OXYCODONE HYDROCHLORIDE AND ACETAMINOPHEN 1 TABLET: 5; 325 TABLET ORAL at 12:01

## 2022-01-01 RX ADMIN — ENOXAPARIN SODIUM 80 MG: 80 INJECTION SUBCUTANEOUS at 06:04

## 2022-01-01 RX ADMIN — DOCUSATE SODIUM 100 MG: 100 CAPSULE, LIQUID FILLED ORAL at 21:34

## 2022-01-01 RX ADMIN — CEFDINIR 300 MG: 300 CAPSULE ORAL at 08:39

## 2022-01-01 RX ADMIN — SODIUM CHLORIDE, PRESERVATIVE FREE 10 ML: 5 INJECTION INTRAVENOUS at 00:17

## 2022-01-01 RX ADMIN — DOCUSATE SODIUM 100 MG: 100 CAPSULE, LIQUID FILLED ORAL at 20:45

## 2022-01-01 RX ADMIN — ZIPRASIDONE MESYLATE 10 MG: 20 INJECTION, POWDER, LYOPHILIZED, FOR SOLUTION INTRAMUSCULAR at 04:08

## 2022-01-01 RX ADMIN — OXYCODONE HYDROCHLORIDE AND ACETAMINOPHEN 1 TABLET: 5; 325 TABLET ORAL at 02:42

## 2022-01-01 RX ADMIN — VALSARTAN 80 MG: 80 TABLET, FILM COATED ORAL at 08:39

## 2022-01-01 RX ADMIN — POTASSIUM CHLORIDE 40 MEQ: 1.5 POWDER, FOR SOLUTION ORAL at 23:35

## 2022-01-01 RX ADMIN — OXYCODONE HYDROCHLORIDE AND ACETAMINOPHEN 1 TABLET: 5; 325 TABLET ORAL at 20:49

## 2022-01-01 RX ADMIN — OXYCODONE HYDROCHLORIDE AND ACETAMINOPHEN 1 TABLET: 5; 325 TABLET ORAL at 00:56

## 2022-01-01 RX ADMIN — FAMOTIDINE 20 MG: 20 TABLET, FILM COATED ORAL at 08:22

## 2022-01-01 RX ADMIN — SODIUM CHLORIDE, PRESERVATIVE FREE 10 ML: 5 INJECTION INTRAVENOUS at 21:19

## 2022-01-01 RX ADMIN — LEVOFLOXACIN 250 MG: 250 TABLET, FILM COATED ORAL at 23:15

## 2022-01-01 RX ADMIN — FAMOTIDINE 20 MG: 20 TABLET, FILM COATED ORAL at 09:48

## 2022-01-01 RX ADMIN — SODIUM CHLORIDE, PRESERVATIVE FREE 10 ML: 5 INJECTION INTRAVENOUS at 23:23

## 2022-01-01 RX ADMIN — ACETAMINOPHEN 975 MG: 325 TABLET ORAL at 06:04

## 2022-01-01 RX ADMIN — OXYCODONE HYDROCHLORIDE AND ACETAMINOPHEN 1 TABLET: 5; 325 TABLET ORAL at 13:24

## 2022-01-01 RX ADMIN — ACETAMINOPHEN 975 MG: 325 TABLET, FILM COATED ORAL at 20:46

## 2022-01-01 RX ADMIN — DOCUSATE SODIUM 100 MG: 100 CAPSULE, LIQUID FILLED ORAL at 12:17

## 2022-01-01 RX ADMIN — DOCUSATE SODIUM 100 MG: 100 CAPSULE, LIQUID FILLED ORAL at 20:49

## 2022-01-01 RX ADMIN — LEVOFLOXACIN 750 MG: 5 INJECTION, SOLUTION INTRAVENOUS at 20:53

## 2022-01-01 RX ADMIN — OXYCODONE HYDROCHLORIDE AND ACETAMINOPHEN 1 TABLET: 5; 325 TABLET ORAL at 09:52

## 2022-01-01 RX ADMIN — LEVOTHYROXINE SODIUM 75 MCG: 75 TABLET ORAL at 06:45

## 2022-01-01 RX ADMIN — POTASSIUM CHLORIDE 40 MEQ: 1.5 POWDER, FOR SOLUTION ORAL at 08:22

## 2022-01-01 RX ADMIN — LEVOTHYROXINE SODIUM 75 MCG: 75 TABLET ORAL at 06:39

## 2022-01-01 RX ADMIN — DOCUSATE SODIUM 100 MG: 100 CAPSULE, LIQUID FILLED ORAL at 09:48

## 2022-01-01 RX ADMIN — CEFDINIR 300 MG: 300 CAPSULE ORAL at 20:46

## 2022-01-01 RX ADMIN — ACETAMINOPHEN 975 MG: 325 TABLET, FILM COATED ORAL at 06:16

## 2022-01-01 RX ADMIN — FAMOTIDINE 20 MG: 20 TABLET, FILM COATED ORAL at 16:42

## 2022-01-01 RX ADMIN — ACETAMINOPHEN 975 MG: 325 TABLET ORAL at 16:16

## 2022-01-01 RX ADMIN — DOCUSATE SODIUM 100 MG: 100 CAPSULE, LIQUID FILLED ORAL at 08:22

## 2022-01-01 RX ADMIN — ACETAMINOPHEN 975 MG: 325 TABLET, FILM COATED ORAL at 06:04

## 2022-01-01 RX ADMIN — SODIUM CHLORIDE, PRESERVATIVE FREE 10 ML: 5 INJECTION INTRAVENOUS at 08:23

## 2022-01-01 RX ADMIN — ACETAMINOPHEN 975 MG: 325 TABLET ORAL at 21:34

## 2022-01-01 RX ADMIN — FAMOTIDINE 20 MG: 20 TABLET, FILM COATED ORAL at 12:17

## 2022-01-01 RX ADMIN — OXYCODONE HYDROCHLORIDE AND ACETAMINOPHEN 1 TABLET: 5; 325 TABLET ORAL at 20:45

## 2022-01-01 RX ADMIN — SODIUM CHLORIDE, POTASSIUM CHLORIDE, SODIUM LACTATE AND CALCIUM CHLORIDE 50 ML/HR: 600; 310; 30; 20 INJECTION, SOLUTION INTRAVENOUS at 19:47

## 2022-01-01 RX ADMIN — SODIUM CHLORIDE, POTASSIUM CHLORIDE, SODIUM LACTATE AND CALCIUM CHLORIDE 50 ML/HR: 600; 310; 30; 20 INJECTION, SOLUTION INTRAVENOUS at 00:17

## 2022-01-01 RX ADMIN — APIXABAN 5 MG: 5 TABLET, FILM COATED ORAL at 18:14

## 2022-01-01 RX ADMIN — VALSARTAN 80 MG: 80 TABLET, FILM COATED ORAL at 08:15

## 2022-01-01 RX ADMIN — OXYCODONE HYDROCHLORIDE AND ACETAMINOPHEN 1 TABLET: 5; 325 TABLET ORAL at 09:46

## 2022-01-01 RX ADMIN — ACETAMINOPHEN 975 MG: 325 TABLET ORAL at 06:45

## 2022-01-01 RX ADMIN — LEVOTHYROXINE SODIUM 75 MCG: 75 TABLET ORAL at 06:16

## 2022-01-01 RX ADMIN — POTASSIUM CHLORIDE 40 MEQ: 1.5 POWDER, FOR SOLUTION ORAL at 13:03

## 2022-01-01 RX ADMIN — LEVOTHYROXINE SODIUM 75 MCG: 75 TABLET ORAL at 06:04

## 2022-01-01 RX ADMIN — ACETAMINOPHEN 975 MG: 325 TABLET, FILM COATED ORAL at 06:03

## 2022-01-01 RX ADMIN — ACETAMINOPHEN 975 MG: 325 TABLET, FILM COATED ORAL at 23:15

## 2022-01-01 RX ADMIN — VALSARTAN 80 MG: 80 TABLET, FILM COATED ORAL at 09:25

## 2022-01-01 RX ADMIN — ACETAMINOPHEN 975 MG: 325 TABLET ORAL at 22:07

## 2022-01-01 RX ADMIN — FAMOTIDINE 20 MG: 20 TABLET, FILM COATED ORAL at 17:47

## 2022-01-01 RX ADMIN — VALSARTAN 80 MG: 80 TABLET, FILM COATED ORAL at 08:38

## 2022-01-01 RX ADMIN — POTASSIUM CHLORIDE 40 MEQ: 1.5 POWDER, FOR SOLUTION ORAL at 21:35

## 2022-01-01 RX ADMIN — DOCUSATE SODIUM 100 MG: 100 CAPSULE, LIQUID FILLED ORAL at 09:46

## 2022-01-01 RX ADMIN — ENOXAPARIN SODIUM 80 MG: 80 INJECTION SUBCUTANEOUS at 06:39

## 2022-01-01 RX ADMIN — SODIUM CHLORIDE, POTASSIUM CHLORIDE, SODIUM LACTATE AND CALCIUM CHLORIDE 75 ML/HR: 600; 310; 30; 20 INJECTION, SOLUTION INTRAVENOUS at 16:15

## 2022-01-01 RX ADMIN — IOPAMIDOL 100 ML: 755 INJECTION, SOLUTION INTRAVENOUS at 20:21

## 2022-01-01 RX ADMIN — SODIUM CHLORIDE, PRESERVATIVE FREE 10 ML: 5 INJECTION INTRAVENOUS at 20:46

## 2022-01-01 RX ADMIN — SODIUM CHLORIDE, PRESERVATIVE FREE 10 ML: 5 INJECTION INTRAVENOUS at 08:39

## 2022-01-01 RX ADMIN — ACETAMINOPHEN 975 MG: 325 TABLET ORAL at 15:10

## 2022-01-01 RX ADMIN — LEVOTHYROXINE SODIUM 75 MCG: 75 TABLET ORAL at 05:58

## 2022-01-01 RX ADMIN — ACETAMINOPHEN 975 MG: 325 TABLET, FILM COATED ORAL at 06:39

## 2022-01-01 RX ADMIN — ACETAMINOPHEN 975 MG: 325 TABLET, FILM COATED ORAL at 15:00

## 2022-01-01 RX ADMIN — ACETAMINOPHEN 975 MG: 325 TABLET, FILM COATED ORAL at 21:17

## 2022-01-01 RX ADMIN — SODIUM CHLORIDE, PRESERVATIVE FREE 10 ML: 5 INJECTION INTRAVENOUS at 21:35

## 2022-01-08 PROBLEM — R41.82 ALTERED MENTAL STATUS: Status: ACTIVE | Noted: 2022-01-01

## 2022-01-08 PROBLEM — E87.6 HYPOKALEMIA: Status: ACTIVE | Noted: 2022-01-01

## 2022-01-08 PROBLEM — E87.1 HYPONATREMIA: Status: ACTIVE | Noted: 2022-01-01

## 2022-01-08 NOTE — ED PROVIDER NOTES
Emergency Medicine Provider Note    Subjective:    HISTORY OF PRESENT ILLNESS     This is a very pleasant 76 y.o. female with a past medical history of breast cancer who presents to the emergency department today with a chief complaint of altered mental status.  Initially patient presented for a fall however family states she has been more altered since recent discharge from the hospital.          History is obtained from the patient, her family, and chart review.       Review of Systems: All other systems are reviewed and are negative other than noted in the HPI.    Past Medical History:  Past Medical History:   Diagnosis Date   • Cataracts, bilateral    • Cellulitis of left lower limb    • Cellulitis of right lower limb    • COPD (chronic obstructive pulmonary disease) ([facility])    • DJD (degenerative joint disease), cervical     DJD C Spine/Spondylolysis, Cervical Region   • Essential (primary) hypertension    • GERD (gastroesophageal reflux disease)    • Hematoma     Left mastectomy site   • Hypothyroidism    • Major depressive disorder, recurrent, moderate ([facility])    • Malignant neoplasm of left breast ([facility])    • 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, estrogen receptor positive ([facility]) 11/30/2021   • Migraine    • Morbid (severe) obesity due to excess calories ([facility])    • Obstructive sleep apnea    • PONV (postoperative nausea and vomiting)    • Primary generalized (osteo)arthritis    • Pure hypercholesterolemia    • Tinea unguium    • Type 2 diabetes mellitus with hyperglycemia ([facility])    • Venous insufficiency (chronic) (peripheral)        Allergies:  Allergies   Allergen Reactions   • Augmentin [Amoxicillin-Pot Clavulanate] Other (See Comments)     UNKNOWN   • Ceclor [Cefaclor] Other (See Comments)     UNKNOWN     • Codeine Other (See Comments)     Not Specified   • Darvon [Propoxyphene] Other (See Comments)     Not Specified   • Fulvicin P-G [Griseofulvin] Other (See Comments)     UNKNOWN   •  "Niacin Other (See Comments)     UNKNOWN   • Valium [Diazepam] Other (See Comments)     UNKNOWN       Past Surgical History:  Past Surgical History:   Procedure Laterality Date   • BREAST ABSCESS INCISION AND DRAINAGE Left 12/21/2021    Procedure: EVACUATION OF LEFT MASTECTOMY HEMATOMA;  Surgeon: Naye Tavarez MD;  Location: [facility] OR;  Service: General;  Laterality: Left;   • BREAST SURGERY Left     Left mastectomy   • CATARACT EXTRACTION, BILATERAL     • CATARACT EXTRACTION, BILATERAL     • ESSURE TUBAL LIGATION     • HYSTERECTOMY     • MASTECTOMY W/ SENTINEL NODE BIOPSY Left 11/30/2021    Procedure: LEFT MASTECTOMY WITH SENTINEL LYMPH NODE BIOPSY WITH MAGTRACE;  Surgeon: Naye Tavarez MD;  Location:  [facility] OR;  Service: General;  Laterality: Left;   • VARICOSE VEIN SURGERY Right        Family History:  Family History   Problem Relation Age of Onset   • Hypertension Sister    • Heart disease Mother        Social History:  Social History     Socioeconomic History   • Marital status:    Tobacco Use   • Smoking status: Current Every Day Smoker     Packs/day: 1.00     Types: Electronic Cigarette   • Smokeless tobacco: Never Used   Vaping Use   • Vaping Use: Every day   • Substances: Nicotine, patient states it has \"3%\" not sure if it is THC or CBD   • Devices: Disposable   • Passive vaping exposure: Yes   Substance and Sexual Activity   • Alcohol use: No   • Drug use: No   • Sexual activity: Defer       Home Medications:  Prior to Admission medications    Medication Sig Start Date End Date Taking? Authorizing Provider   acetaminophen (Tylenol) 325 MG tablet Take 3 tablets by mouth Every 8 (Eight) Hours. Take every 8 hours for 3 days then take prn as needed. 12/3/21 12/3/22  Naye Tavarez MD   docusate sodium (COLACE) 100 MG capsule Take 100 mg by mouth 2 (Two) Times a Day.    Provider, MD Chelsie   levothyroxine (Euthyrox) 75 MCG tablet Take 75 mcg by mouth Daily.    Provider, " MD Chelise   ondansetron (Zofran) 4 MG tablet Take 1 tablet by mouth Every 8 (Eight) Hours As Needed for Nausea. 12/3/21 12/3/22  Naye Tavarez MD   ondansetron (Zofran) 4 MG tablet Take 1 tablet by mouth Every 8 (Eight) Hours As Needed for Nausea. 12/21/21 12/21/22  Naye Tavarez MD   oxyCODONE (Roxicodone) 5 MG immediate release tablet Take 1 tablet by mouth Every 8 (Eight) Hours As Needed for Severe Pain . 12/21/21 12/21/22  Naye Tavarez MD   oxyCODONE-acetaminophen (PERCOCET)  MG per tablet Take 1 tablet by mouth Every 8 (Eight) Hours As Needed for Moderate Pain .    ProviderChelsie MD   triamterene-hydrochlorothiazide (MAXZIDE) 75-50 MG per tablet Take 1 tablet by mouth Daily.    ProviderChelsie MD   valsartan (DIOVAN) 80 MG tablet Take 80 mg by mouth 2 (Two) Times a Day.    Provider, MD Chelsie         Objective:    PHYSICAL EXAM     Vitals:   Vitals:    01/13/22 0800   BP: 140/94   Pulse: 98   Resp: 18   Temp: 98.2 °F (36.8 °C)   SpO2: 95%     GENERAL: Well appearing, in no acute distress.   HEENT: Moist mucous membranes, oropharynx clear without lesions, exudates, thrush.   EYES: No scleral icterus, conjunctivae clear.   NECK: No cervical lymphadenopathy, no stiffness.  CARDIAC: Normal rate, regular rhythm, no murmurs, 2+ peripheral pulses in all four extremities, normal capillary refill.   PULMONARY: Normal work of breathing on room air, lungs are clear to auscultation bilaterally without wheezes, crackles, rhonchi.  ABDOMINAL: Normal bowel sounds, abdomen is soft, non-tender, non-distended, no hepatomegaly or splenomegaly.   MUSCULOSKELETAL: Normal range of motion, no lower extremity edema.  NEUROLOGIC: EOM grossly intact and moves all four extremities with normal strength.  SKIN: Warm and dry without rashes.   PSYCHIATRIC: Mood and affect are normal.     PROCEDURES     Procedures    LAB AND RADIOLOGY RESULTS     Lab Results (last 24 hours)     ** No results  found for the last 24 hours. **          CT Head Without Contrast, CT Cervical Spine Without Contrast    Result Date: 1/8/2022  Narrative: EXAM: CT OF THE HEAD WITHOUT IV CONTRAST CT CERVICAL SPINE WITHOUT IV CONTRAST 1/8/2022.  COMPARISON: None  INDICATION: Altered mental status  PROCEDURE: Non contrast enhanced head CT and cervical spine CT were performed. The head images were formatted in the axial plane at 5 mm thick intervals. The cervical spine images were formatted in the axial, coronal and sagittal planes.  Radiation dose equals DLP 1004 mGy-cm.  Automated exposure control dose reduction technique was implemented.  FINDINGS: HEAD: Generalized volume loss. Chronic microvessel changes.. There is no evidence of mass-effect or midline shift. The gray-white differentiation is preserved.  There is no evidence of intracranial contusion, hemorrhage, or skull fracture.  The visualized portions of the paranasal sinuses and mastoid air cells are unremarkable.  CERVICAL SPINE: The odontoid process is well approximated with the anterior body of C1 and well aligned between the lateral masses of C1. Reversal of the normal lordosis of the cervical spine with grade 1 listhesis of C2 on C3 C3 and C4. There is no acute compression deformity. No dislocation. Multilevel degenerative changes, with disc disease and facet arthropathy. Visualized lung apices demonstrate no focal consolidation, pleural effusion or pneumothorax. There is no paraspinal hematoma.         Impression: CT head. No acute intracranial abnormalities.  CT cervical spine. No acute osseous posttraumatic findings. This report was finalized on 01/08/2022 12:06 by Dr. Kandace Fabian MD.    XR Chest 1 View    Result Date: 1/16/2022  Narrative: EXAMINATION: XR CHEST 1 VW-  1/16/2022 4:23 PM CST  HISTORY: Shortness of breath.  One view chest x-ray.  Comparison is made with January 8, 2022.  Heart size is magnified though there does appear to be mild cardiomegaly.  Aortic arch calcification is present.  Chronic lung changes with scattered granulomas.  No focal consolidation.  No pneumothorax or heart failure.  Summary: 1. Stable chronic lung changes.  This report was finalized on 01/16/2022 16:56 by Dr. López Tolentino MD.    XR Chest 1 View    Result Date: 1/8/2022  Narrative: Exam:   XR CHEST 1 VW-   Date:  1/8/2022  History:  Female, age  76 years;ams; R41.82-Altered mental status, unspecified; S09.90XA-Unspecified injury of head, initial encounter; W19.XXXA-Unspecified fall, initial encounter  COMPARISON:  Chest x-ray dated 11/10/2021  Findings :  Mild cardiomegaly. Chronic coarsening of the interstitium. There is a catheter projected over the left hemithorax. Lungs are without focal infiltrate, mass or effusions.  The bones show no acute pathology.       Impression: Impression:  Mild cardiomegaly, with chronic interstitial lung changes.  This report was finalized on 01/08/2022 11:43 by Dr. Kandace Fabian MD.    US Venous Doppler Lower Extremity Bilateral (duplex)    Result Date: 1/18/2022  Narrative: History: Swelling      Impression: Impression: There is evidence of deep venous thrombosis in bilateral lower extremities.  Comments: Bilateral lower extremity venous duplex exam was performed using color Doppler flow, Doppler waveform analysis, and grayscale imaging, with and without compression. There is evidence of nonocclusive deep venous thrombosis in the popliteal vein in the right lower extremity. There is also evidence of deep venous thrombosis in the posterior tibial vein in the left lower extremity. No thrombus is identified in the saphenofemoral junctions and greater saphenous veins bilaterally.   This report was finalized on 01/18/2022 16:06 by Dr. Kwame Corado MD.    CT Angiogram Chest    Result Date: 1/16/2022  Narrative: EXAMINATION: CT ANGIOGRAM CHEST-   1/16/2022 8:04 PM CST  HISTORY: PE suspected, low/intermediate prob, positive D-dimer  In order to have  a CT radiation dose as low as reasonably achievable Automated Exposure Control was utilized for adjustment of the mA and/or KV according to patient size.  DLP in mGycm= 338.  CT angiogram chest. CT angiography protocol. CT imaging with bolus IV contrast injection. Under concurrent supervision axial, sagittal, coronal, and three-dimensional data sets were constructed.  Normal heart size. Coronary artery calcification. Thoracic aorta calcification.  Symmetric and normally opacified pulmonary arteries. No pulmonary embolism.  Chronic interstitial lung disease. Mild dependent atelectasis. No pneumonia, pneumothorax, or pleural effusion.  Summary: 1. No pulmonary embolism. 2. Chronic lung changes.            This report was finalized on 01/16/2022 20:30 by Dr. López Tolentino MD.      ED course:    Medications   magnesium sulfate 2g/50 mL (PREMIX) infusion (0 g Intravenous Stopped 1/8/22 1615)   ziprasidone (GEODON) injection 10 mg (10 mg Intramuscular Given 1/9/22 0408)     ED Course as of 01/24/22 1443   Sat Jan 08, 2022   1014 Patient is 18 or older, presenting with minor blunt head trauma. Head CT (including cosigned orders) was ordered by an emergency care clinician for trauma because the patient has loss of consciousness and/or post-traumatic amnesia and patient is 65 or older  [CF]      ED Course User Index  [CF] Jose Green MD       Amount and/or complexity of data reviewed:    • Clinical lab tests ordered and reviewed.  • Tests in the radiology section ordered and reviewed.  • Independent visualization of imaging, tracing, or specimen is remarkable for EKG with no STEMI criteria.  • Discuss the patient with another provider: Hospitalist      Risk of significant complications, morbidity, and/or mortality.    •  Presenting problem: high  •  Diagnostic procedures: high  •  Management options: high        MEDICAL DECISION MAKING     Patient presents with altered mental status. Upon arrival to the  Emergency Department the patient is in no acute distress vital signs are reassuring.  Labs are sent, CT scan of the head is sent, overall work-up is reassuring.  She is prescribed narcotics which could be leading to her altered mental status or this could simply be some sort of delirium due to recent hospitalization.  However, she is not at mental baseline so I discussed the case with the hospitalist and patient has been admitted for further evaluation and management.        Diagnosis:    Final diagnoses:   Closed head injury, initial encounter   Fall, initial encounter   Altered mental status, unspecified altered mental status type   Delirium   Hypokalemia   Hyponatremia         ED Disposition:     ED Disposition     ED Disposition Condition Comment    Decision to Admit  Level of Care: Remote Telemetry [26]   Diagnosis: Hyponatremia [[id number]]   Admitting Physician: ESTELA ESPINO [[id number]]   Attending Physician: ESTELA ESPINO [[id number]]   Certification: I Certify That Inpatient Hospital Services Are Medically Necessary For Greater Than 2 Midnights            [facility]  [address] [place] [address]  [phone]        Pb Mitchell APRN  [address] [facility] [address]  [place] [address]  [phone]    Schedule an appointment as soon as possible for a visit in 1 week(s)  AFTER DISCHARGE FROM REHAB    Naye Tavarez MD  [address] [facility] [address]  [place] [address]  [phone]    Follow up on 2/14/2022  1:30         Medication List      Changed    valsartan 80 MG tablet  Commonly known as: DIOVAN  Take 1 tablet by mouth Daily.  What changed: when to take this        Stop    oxyCODONE-acetaminophen  MG per tablet  Commonly known as: PERCOCET     triamterene-hydrochlorothiazide 75-50 MG per tablet  Commonly known as: MAXZIDE           Where to Get Your Medications      Information about where to get these medications is not yet available    Ask your nurse or doctor about these  medications  · valsartan 80 MG tablet              Jose Green MD  01/24/22 [id number]

## 2022-01-08 NOTE — H&P
[facility] Medicine Services  HISTORY AND PHYSICAL    Date of Admission: 1/8/2022  Primary Care Physician: Pb Mitchell APRN    Subjective     Chief Complaint: Altered mental status.    History of Present Illness  Patient 76-year-old  female came to ER secondary to altered mental status.  Altered mental status been going for the last week.  Patient's also been following and has not been eating well.  Daughter stated she does not eat well anyway and her baseline.  She believes that her mom has dementia.  Patient is usually forgetful short-term memory is at baseline.  Patient used to get around with a walker.  Patient lives by herself.  Status post hematoma drainage from left breast about November 30, 2011 ago by Dr. Gamez.  Left-sided drainage tube was placed about 12/21/2021.  Patient still to follow-up with Dr Gmaez last week to have the drainage tube removed but did not make the appointment.  Patient daughter requested that general surgery see her inpatient.  Laboratory in ER shows low sodium and low potassium.  Positive for oxycodone UDS.  No sign of pneumonia or urinary tract infection at this time.     Discussed with the daughter and the mom possibly stop pain medication for now until she reaches more alert.  Patient got very angry..  Plan for electrolyte replacement and IV hydration discussed with daughter.    Review of Systems   Constitutional: Positive for activity change, appetite change and fatigue. Negative for chills and fever.   HENT: Negative for hearing loss, nosebleeds, tinnitus and trouble swallowing.    Eyes: Negative for visual disturbance.   Respiratory: Negative for cough, chest tightness, shortness of breath and wheezing.    Cardiovascular: Negative for chest pain, palpitations and leg swelling.   Gastrointestinal: Negative for abdominal distention, abdominal pain, blood in stool, constipation, diarrhea, nausea and vomiting.   Endocrine:  Negative for cold intolerance, heat intolerance, polydipsia, polyphagia and polyuria.   Genitourinary: Negative for decreased urine volume, difficulty urinating, dysuria, flank pain, frequency and hematuria.   Musculoskeletal: Positive for arthralgias, gait problem and myalgias. Negative for joint swelling.   Skin: Negative for rash.   Allergic/Immunologic: Negative for immunocompromised state.   Neurological: Positive for weakness. Negative for dizziness, syncope, light-headedness and headaches.   Hematological: Negative for adenopathy. Does not bruise/bleed easily.   Psychiatric/Behavioral: Positive for confusion. Negative for sleep disturbance. The patient is not nervous/anxious.         Otherwise complete ROS reviewed and negative except as mentioned in the HPI.      Past Medical History:   Past Medical History:   Diagnosis Date   • Cataracts, bilateral    • Cellulitis of left lower limb    • Cellulitis of right lower limb    • COPD (chronic obstructive pulmonary disease) ([facility])    • DJD (degenerative joint disease), cervical     DJD C Spine/Spondylolysis, Cervical Region   • Essential (primary) hypertension    • GERD (gastroesophageal reflux disease)    • Hematoma     Left mastectomy site   • Hypothyroidism    • Major depressive disorder, recurrent, moderate ([facility])    • Malignant neoplasm of left breast ([facility])    • Migraine    • Morbid (severe) obesity due to excess calories ([facility])    • Obstructive sleep apnea    • PONV (postoperative nausea and vomiting)    • Primary generalized (osteo)arthritis    • Pure hypercholesterolemia    • Tinea unguium    • Type 2 diabetes mellitus with hyperglycemia ([facility])    • Venous insufficiency (chronic) (peripheral)        Past Surgical History:  Past Surgical History:   Procedure Laterality Date   • BREAST ABSCESS INCISION AND DRAINAGE Left 12/21/2021    Procedure: EVACUATION OF LEFT MASTECTOMY HEMATOMA;  Surgeon: Naye Tavarez MD;  Location: [facility];  Service: General;   Laterality: Left;   • BREAST SURGERY Left     Left mastectomy   • CATARACT EXTRACTION, BILATERAL     • CATARACT EXTRACTION, BILATERAL     • ESSURE TUBAL LIGATION     • HYSTERECTOMY     • MASTECTOMY W/ SENTINEL NODE BIOPSY Left 11/30/2021    Procedure: LEFT MASTECTOMY WITH SENTINEL LYMPH NODE BIOPSY WITH MAGTRACE;  Surgeon: Naye Tavarez MD;  Location: [facility];  Service: General;  Laterality: Left;   • VARICOSE VEIN SURGERY Right        Family History: family history includes Heart disease in her mother; Hypertension in her sister.    Social History:  reports that she has been smoking electronic cigarette. She has been smoking about 1.00 pack per day. She has never used smokeless tobacco. She reports that she does not drink alcohol and does not use drugs.    Medications:  Prior to Admission medications    Medication Sig Start Date End Date Taking? Authorizing Provider   acetaminophen (Tylenol) 325 MG tablet Take 3 tablets by mouth Every 8 (Eight) Hours. Take every 8 hours for 3 days then take prn as needed. 12/3/21 12/3/22  Naye Tavarez MD   docusate sodium (COLACE) 100 MG capsule Take 100 mg by mouth 2 (Two) Times a Day.    Provider, MD Chelsie   levothyroxine (Euthyrox) 75 MCG tablet Take 75 mcg by mouth Daily.    Provider, MD Chelsie   ondansetron (Zofran) 4 MG tablet Take 1 tablet by mouth Every 8 (Eight) Hours As Needed for Nausea. 12/3/21 12/3/22  Naye Tavarez MD   ondansetron (Zofran) 4 MG tablet Take 1 tablet by mouth Every 8 (Eight) Hours As Needed for Nausea. 12/21/21 12/21/22  Naye Tavarez MD   oxyCODONE (Roxicodone) 5 MG immediate release tablet Take 1 tablet by mouth Every 8 (Eight) Hours As Needed for Severe Pain . 12/21/21 12/21/22  Naye Tavarez MD   oxyCODONE-acetaminophen (PERCOCET)  MG per tablet Take 1 tablet by mouth Every 8 (Eight) Hours As Needed for Moderate Pain .    ProviderChelsie MD   triamterene-hydrochlorothiazide (MAXZIDE)  "75-50 MG per tablet Take 1 tablet by mouth Daily.    Provider, Historical, MD   valsartan (DIOVAN) 80 MG tablet Take 80 mg by mouth 2 (Two) Times a Day.    Provider, MD Chelsie     Allergies:  Allergies   Allergen Reactions   • Augmentin [Amoxicillin-Pot Clavulanate] Other (See Comments)     UNKNOWN   • Ceclor [Cefaclor] Other (See Comments)     UNKNOWN     • Codeine Other (See Comments)     Not Specified   • Darvon [Propoxyphene] Other (See Comments)     Not Specified   • Fulvicin P-G [Griseofulvin] Other (See Comments)     UNKNOWN   • Niacin Other (See Comments)     UNKNOWN   • Valium [Diazepam] Other (See Comments)     UNKNOWN       Objective     Vital Signs: /45   Pulse 88   Temp 97.8 °F (36.6 °C) (Oral)   Resp 20   Ht 157.5 cm (62\")   Wt 89.4 kg (197 lb)   LMP  (LMP Unknown)   SpO2 91%   BMI 36.03 kg/m²   Physical Exam  Vitals and nursing note reviewed.   Constitutional:       Appearance: She is well-developed.   HENT:      Head: Normocephalic.   Eyes:      Conjunctiva/sclera: Conjunctivae normal.      Pupils: Pupils are equal, round, and reactive to light.   Neck:      Vascular: No JVD.   Cardiovascular:      Rate and Rhythm: Normal rate and regular rhythm.      Heart sounds: Normal heart sounds. No murmur heard.  No friction rub. No gallop.    Pulmonary:      Effort: No respiratory distress.      Breath sounds: No wheezing or rales.      Comments: Diminished breath and bilateral, clear .  Chest:      Chest wall: No tenderness.   Abdominal:      General: Bowel sounds are normal. There is no distension.      Palpations: Abdomen is soft.      Tenderness: There is no abdominal tenderness. There is no guarding or rebound.   Musculoskeletal:         General: No tenderness or deformity. Normal range of motion.      Cervical back: Neck supple.   Skin:     General: Skin is warm and dry.      Findings: No rash.      Comments: Status post left breast mastectomy, no sign cellulitis, wound looks clean " "dry and intact.  Drainage tubes left breast noted with tent blood inside the tube.   Neurological:      General: No focal deficit present.      Mental Status: She is alert.      Cranial Nerves: No cranial nerve deficit.      Motor: Weakness present. No abnormal muscle tone.      Coordination: Coordination abnormal.      Gait: Gait abnormal.      Deep Tendon Reflexes: Reflexes normal.   Psychiatric:         Behavior: Behavior normal.       Results Reviewed:    Lab Results (last 24 hours)     Procedure Component Value Units Date/Time    Procalcitonin [[id number]]  (Normal) Collected: 01/08/22 1045    Specimen: Blood from Arm, Right Updated: 01/08/22 1123     Procalcitonin 0.15 ng/mL     Narrative:      As a Marker for Sepsis (Non-Neonates):     1. <0.5 ng/mL represents a low risk of severe sepsis and/or septic shock.  2. >2 ng/mL represents a high risk of severe sepsis and/or septic shock.    As a Marker for Lower Respiratory Tract Infections that require antibiotic therapy:  PCT on Admission     Antibiotic Therapy             6-12 Hrs later  >0.5                          Strongly Recommended            >0.25 - <0.5             Recommended  0.1 - 0.25                  Discouraged                       Remeasure/reassess PCT  <0.1                         Strongly Discouraged         Remeasure/reassess PCT      As 28 day mortality risk marker: \"Change in Procalcitonin Result\" (>80% or <=80%) if Day 0 (or Day 1) and Day 4 values are available. Refer to http://[email or web address]-pct-[email or web address]/    Change in PCT <=80 %   A decrease of PCT levels below or equal to 80% defines a positive change in PCT test result representing a higher risk for 28-day all-cause mortality of patients diagnosed with severe sepsis or septic shock.    Change in PCT >80 %   A decrease of PCT levels of more than 80% defines a negative change in PCT result representing a lower risk for 28-day all-cause mortality of patients diagnosed with severe sepsis " or septic shock.                C-reactive Protein [[id number]]  (Abnormal) Collected: 01/08/22 1045    Specimen: Blood from Arm, Right Updated: 01/08/22 1120     C-Reactive Protein 1.23 mg/dL     Manual Differential [[id number]]  (Abnormal) Collected: 01/08/22 1045    Specimen: Blood from Arm, Right Updated: 01/08/22 1119     Neutrophil % 84.7 %      Lymphocyte % 7.1 %      Monocyte % 7.1 %      Atypical Lymphocyte % 1.0 %      Neutrophils Absolute 5.89 10*3/mm3      Lymphocytes Absolute 0.56 10*3/mm3      Monocytes Absolute 0.49 10*3/mm3      Elliptocytes Slight/1+     Microcytes Slight/1+     Ovalocytes Slight/1+     Poikilocytes Slight/1+     Polychromasia Slight/1+     Target Cells Slight/1+     Stomatocytes Slight/1+     WBC Morphology Normal     Platelet Morphology Normal    CBC & Differential [[id number]]  (Abnormal) Collected: 01/08/22 1045    Specimen: Blood from Arm, Right Updated: 01/08/22 1119    Narrative:      The following orders were created for panel order CBC & Differential.  Procedure                               Abnormality         Status                     ---------                               -----------         ------                     CBC Auto Differential[[id number]]        Abnormal            Final result                 Please view results for these tests on the individual orders.    CBC Auto Differential [[id number]]  (Abnormal) Collected: 01/08/22 1045    Specimen: Blood from Arm, Right Updated: 01/08/22 1119     WBC 6.95 10*3/mm3      RBC 5.76 10*6/mm3      Hemoglobin 13.4 g/dL      Hematocrit 42.9 %      MCV 74.5 fL      MCH 23.3 pg      MCHC 31.2 g/dL      RDW 16.4 %      RDW-SD 43.0 fl      MPV 9.5 fL      Platelets 298 10*3/mm3     Comprehensive Metabolic Panel [[id number]]  (Abnormal) Collected: 01/08/22 1045    Specimen: Blood from Arm, Right Updated: 01/08/22 1118     Glucose 140 mg/dL      BUN 12 mg/dL      Creatinine 0.61 mg/dL      Sodium 129 mmol/L      Potassium 2.9 mmol/L       Chloride 89 mmol/L      CO2 29.0 mmol/L      Calcium 9.2 mg/dL      Total Protein 7.1 g/dL      Albumin 3.80 g/dL      ALT (SGPT) 13 U/L      AST (SGOT) 36 U/L      Alkaline Phosphatase 57 U/L      Total Bilirubin 0.8 mg/dL      eGFR Non African Amer 95 mL/min/1.73      Globulin 3.3 gm/dL      A/G Ratio 1.2 g/dL      BUN/Creatinine Ratio 19.7     Anion Gap 11.0 mmol/L     Narrative:      GFR Normal >60  Chronic Kidney Disease <60  Kidney Failure <15      Troponin [[id number]]  (Normal) Collected: 01/08/22 1045    Specimen: Blood from Arm, Right Updated: 01/08/22 1116     Troponin T <0.010 ng/mL     Narrative:      Troponin T Reference Range:  <= 0.03 ng/mL-   Negative for AMI  >0.03 ng/mL-     Abnormal for myocardial necrosis.  Clinicians would have to utilize clinical acumen, EKG, Troponin and serial changes to determine if it is an Acute Myocardial Infarction or myocardial injury due to an underlying chronic condition.       Results may be falsely decreased if patient taking Biotin.      Lactic Acid, Plasma [[id number]]  (Normal) Collected: 01/08/22 1045    Specimen: Blood from Arm, Right Updated: 01/08/22 1116     Lactate 1.6 mmol/L     BNP [[id number]]  (Abnormal) Collected: 01/08/22 1045    Specimen: Blood from Arm, Right Updated: 01/08/22 1115     proBNP 2,331.0 pg/mL     Narrative:      Among patients with dyspnea, NT-proBNP is highly sensitive for the detection of acute congestive heart failure. In addition NT-proBNP of <300 pg/ml effectively rules out acute congestive heart failure with 99% negative predictive value.    Results may be falsely decreased if patient taking Biotin.      Blood Culture - Blood, Arm, Right [[id number]] Collected: 01/08/22 1040    Specimen: Blood from Arm, Right Updated: 01/08/22 1114    Blood Culture - Blood, Arm, Right [[id number]] Collected: 01/08/22 1045    Specimen: Blood from Arm, Right Updated: 01/08/22 1114    Urine Drug Screen - Urine, Clean Catch [[id number]]  (Abnormal)  Collected: 01/08/22 1046    Specimen: Urine, Clean Catch Updated: 01/08/22 1107     THC, Screen, Urine Negative     Phencyclidine (PCP), Urine Negative     Cocaine Screen, Urine Negative     Methamphetamine, Ur Negative     Opiate Screen Negative     Amphetamine Screen, Urine Negative     Benzodiazepine Screen, Urine Negative     Tricyclic Antidepressants Screen Negative     Methadone Screen, Urine Negative     Barbiturates Screen, Urine Negative     Oxycodone Screen, Urine Positive     Propoxyphene Screen Negative     Buprenorphine, Screen, Urine Negative    Narrative:      Cutoff For Drugs Screened:    Amphetamines               500 ng/ml  Barbiturates               200 ng/ml  Benzodiazepines            150 ng/ml  Cocaine                    150 ng/ml  Methadone                  200 ng/ml  Opiates                    100 ng/ml  Phencyclidine               25 ng/ml  THC                            50 ng/ml  Methamphetamine            500 ng/ml  Tricyclic Antidepressants  300 ng/ml  Oxycodone                  100 ng/ml  Propoxyphene               300 ng/ml  Buprenorphine               10 ng/ml    The normal value for all drugs tested is negative. This report includes unconfirmed screening results, with the cutoff values listed, to be used for medical treatment purposes only.  Unconfirmed results must not be used for non-medical purposes such as employment or legal testing.  Clinical consideration should be applied to any drug of abuse test, particularly when unconfirmed results are used.      Protime-INR [[id number]]  (Abnormal) Collected: 01/08/22 1045    Specimen: Blood from Arm, Right Updated: 01/08/22 1106     Protime 13.8 Seconds      INR 1.10    aPTT [[id number]]  (Normal) Collected: 01/08/22 1045    Specimen: Blood from Arm, Right Updated: 01/08/22 1106     PTT 33.2 seconds     Urinalysis With Culture If Indicated - Urine, Clean Catch [[id number]]  (Abnormal) Collected: 01/08/22 1046    Specimen: Urine, Clean Catch  Updated: 01/08/22 1103     Color, UA Yellow     Appearance, UA Clear     pH, UA 7.5     Specific Gravity, UA 1.009     Glucose, UA Negative     Ketones, UA Negative     Bilirubin, UA Negative     Blood, UA Negative     Protein, UA 30 mg/dL (1+)     Leuk Esterase, UA Negative     Nitrite, UA Negative     Urobilinogen, UA 0.2 E.U./dL    Urinalysis, Microscopic Only - Urine, Clean Catch [[id number]]  (Abnormal) Collected: 01/08/22 1046    Specimen: Urine, Clean Catch Updated: 01/08/22 1103     RBC, UA 0-2 /HPF      WBC, UA 0-2 /HPF      Bacteria, UA None Seen /HPF      Squamous Epithelial Cells, UA 0-2 /HPF      Hyaline Casts, UA 0-2 /LPF      Methodology Automated Microscopy           Radiology Data:    Imaging Results (Last 24 Hours)     Procedure Component Value Units Date/Time    CT Head Without Contrast [[id number]] Collected: 01/08/22 1202     Updated: 01/08/22 1209    Narrative:      EXAM:   CT OF THE HEAD WITHOUT IV CONTRAST   CT CERVICAL SPINE WITHOUT IV CONTRAST 1/8/2022.     COMPARISON: None      INDICATION: Altered mental status      PROCEDURE: Non contrast enhanced head CT and cervical spine CT were  performed. The head images were formatted in the axial plane at 5 mm  thick intervals. The cervical spine images were formatted in the axial,  coronal and sagittal planes.     Radiation dose equals DLP 1004 mGy-cm.  Automated exposure control dose  reduction technique was implemented.     FINDINGS:   HEAD: Generalized volume loss. Chronic microvessel changes.. There is no  evidence of mass-effect or midline shift. The gray-white differentiation  is preserved.  There is no evidence of intracranial contusion,  hemorrhage, or skull fracture.  The visualized portions of the paranasal  sinuses and mastoid air cells are unremarkable.     CERVICAL SPINE: The odontoid process is well approximated with the  anterior body of C1 and well aligned between the lateral masses of C1.  Reversal of the normal lordosis of the  cervical spine with grade 1  listhesis of C2 on C3 C3 and C4. There is no acute compression  deformity. No dislocation. Multilevel degenerative changes, with disc  disease and facet arthropathy. Visualized lung apices demonstrate no  focal consolidation, pleural effusion or pneumothorax. There is no  paraspinal hematoma.                Impression:      CT head. No acute intracranial abnormalities.     CT cervical spine. No acute osseous posttraumatic findings.  This report was finalized on 01/08/2022 12:06 by Dr. Kandace Fabian MD.    CT Cervical Spine Without Contrast [[id number]] Collected: 01/08/22 1202     Updated: 01/08/22 1209    Narrative:      EXAM:   CT OF THE HEAD WITHOUT IV CONTRAST   CT CERVICAL SPINE WITHOUT IV CONTRAST 1/8/2022.     COMPARISON: None      INDICATION: Altered mental status      PROCEDURE: Non contrast enhanced head CT and cervical spine CT were  performed. The head images were formatted in the axial plane at 5 mm  thick intervals. The cervical spine images were formatted in the axial,  coronal and sagittal planes.     Radiation dose equals DLP 1004 mGy-cm.  Automated exposure control dose  reduction technique was implemented.     FINDINGS:   HEAD: Generalized volume loss. Chronic microvessel changes.. There is no  evidence of mass-effect or midline shift. The gray-white differentiation  is preserved.  There is no evidence of intracranial contusion,  hemorrhage, or skull fracture.  The visualized portions of the paranasal  sinuses and mastoid air cells are unremarkable.     CERVICAL SPINE: The odontoid process is well approximated with the  anterior body of C1 and well aligned between the lateral masses of C1.  Reversal of the normal lordosis of the cervical spine with grade 1  listhesis of C2 on C3 C3 and C4. There is no acute compression  deformity. No dislocation. Multilevel degenerative changes, with disc  disease and facet arthropathy. Visualized lung apices demonstrate no  focal  consolidation, pleural effusion or pneumothorax. There is no  paraspinal hematoma.                Impression:      CT head. No acute intracranial abnormalities.     CT cervical spine. No acute osseous posttraumatic findings.  This report was finalized on 01/08/2022 12:06 by Dr. Kandace Fabian MD.    XR Chest 1 View [[id number]] Collected: 01/08/22 1142     Updated: 01/08/22 1146    Narrative:      Exam:   XR CHEST 1 VW-       Date:  1/8/2022      History:  Female, age  76 years;ams; R41.82-Altered mental status,  unspecified; S09.90XA-Unspecified injury of head, initial encounter;  W19.XXXA-Unspecified fall, initial encounter     COMPARISON:  Chest x-ray dated 11/10/2021     Findings :     Mild cardiomegaly. Chronic coarsening of the interstitium. There is a  catheter projected over the left hemithorax. Lungs are without focal  infiltrate, mass or effusions.  The bones show no acute pathology.         Impression:      Impression:     Mild cardiomegaly, with chronic interstitial lung changes.     This report was finalized on 01/08/2022 11:43 by Dr. Kandace Fabian MD.          I have personally reviewed and interpreted the radiology studies and ECG obtained at time of admission.     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    Altered mental status.  CT head. No acute intracranial abnormalities.  CT cervical spine. No acute osseous posttraumatic findings.  Chest x-ray-Mild cardiomegaly, with chronic interstitial lung changes.    Nausea/vomiting. Pepcid. Zofran as needed.    Hyponatremia.  LR 75 cc an hour.  Serial BMP.    Hypokalemia.  P.o. potassium.  Magnesium level pending.  Patient got to get magnesium in ER.    History of neoplasm of the left breast.  Status post mastectomy of the  left breast on November 30, 2021.  Status post hematoma evacuation December 21, 2021, AILYN tube is in place left breast.  UDS positive for oxycodone.    Hypertension.  Hold Maxide and Diovan for now due to hypotension.    Constipation.  Continue Colace.     Hypothyroidism.  Continue Synthroid.    Nausea/reflux.  Pepcid.  Zofran as needed.  Psych treatment psychiatry    Chronic pain.  Hold oxycodone for now.  Hold Percocet for now.    Code Status: DNR.  DNI.  Patient admitted medical decisions for self, her daughter Kassidy Lind will make it for her.     I discussed the patient's findings and my recommendations with: Patient and daughter.    Estimated length of stay: 2 to 4 days.    Electronically signed by Thang Aceves MD, 01/08/22, 1:10 PM CST.             Ears: no ear pain and no hearing problems.Nose: no nasal congestion and no nasal drainage.Mouth/Throat: no dysphagia, no hoarseness and no throat pain.Neck: no lumps, no stiffness and no swollen glands.

## 2022-01-09 NOTE — PLAN OF CARE
Problem: Adult Inpatient Plan of Care  Goal: Plan of Care Review  Outcome: Ongoing, Progressing  Flowsheets (Taken 1/9/2022 0820)  Progress: (Eval) --  Plan of Care Reviewed With: (RNYohana. Reinforcements needed for pt.)  • patient  • other (see comments)  Outcome Summary:  Clinical bedside swallowing evaluation completed per SLP. Pt c/o significant neck pain, RN notified yet reported pt not eligible for medicinal tx at that time. Anterior resting head posture at times, though able to momentarily elevate to a upright head posture. Oral mech exam revealed generalized weakness as well as mild to mod R lingual deviation. Pt was presented a full range of consistencies excluding mechanical soft. Adequate labial seal on spoon and straw. Mildly decreased rotary chew and prolonged oral prep with regular solid , oral residue minimal and WFL. No overt s/s of aspiration. Confusion at times, pt also c/o hallucinations at times. Cannot fully r/o aspiration, yet feel observed oropharyngeal swallow fx is likely pt's baseline.   RECS:   • Continue current diet of mechanical soft (considering limited dentition and generalized weakness) with regular/thin liquid consistency  • meds whole with thin liquids  • may warrant distant supervision with meals  • RN to monitor for increased lung congestion. Continued SLP dysphagia services are not warranted at this time. Will plan to complete SMMSE (Standardized Mini-Mental State Examination) later this week for reported cognitive concerns. If changes or new concerns arise in regards to PO toleration, MD to reconsult for dysphagia. Thanks!

## 2022-01-09 NOTE — PLAN OF CARE
Goal Outcome Evaluation:         A&Ox4.  Confused.  Getting out of bed many times overnight.  Not able to rest well.  SCD.  Tele in place.  Converted to NS overnight.  IVF infusing as ordered.  2 L NC.  Will continue to monitor.

## 2022-01-09 NOTE — PLAN OF CARE
Goal Outcome Evaluation:  Plan of Care Reviewed With: patient        Progress: no change  Outcome Summary: Pt c/o pain occasionally throughout shift. Medicated with PO pain meds per pt request. Safety maintained. Pt pulled out IV. IV to be restarted for fluids at 50. Tele on. Voiding per BSC, up with asst x2. AILYN drain in place to left breast. Multiple bruses and abrasions noted. No new skin issues since admission. Tolerating diet without difficulty. cont to monitor.

## 2022-01-09 NOTE — THERAPY EVALUATION
Acute Care - Speech Language Pathology   Swallow Initial Evaluation [facility]     Patient Name: Alissa Dominguez  : 1945  MRN: [id number]  Today's Date: 2022               Admit Date: 2022     Clinical bedside swallowing evaluation completed per SLP. Pt c/o significant neck pain, RN notified yet reported pt not eligible for medicinal tx at that time. Anterior resting head posture at times, though able to momentarily elevate to a upright head posture. Oral mech exam revealed generalized weakness as well as mild to mod R lingual deviation. Pt was presented a full range of consistencies excluding mechanical soft. Adequate labial seal on spoon and straw. Mildly decreased rotary chew and prolonged oral prep with regular solid , oral residue minimal and WFL. No overt s/s of aspiration. Confusion at times, pt also c/o hallucinations at times. Cannot fully r/o aspiration, yet feel observed oropharyngeal swallow fx is likely pt's baseline.   RECS:   • Continue current diet of mechanical soft (considering limited dentition and generalized weakness) with regular/thin liquid consistency  • meds whole with thin liquids  • may warrant distant supervision with meals  • RN to monitor for increased lung congestion. Continued SLP dysphagia services are not warranted at this time. Will plan to complete SMMSE (Standardized Mini-Mental State Examination) later this week for reported cognitive concerns. If changes or new concerns arise in regards to PO toleration, MD to reconsult for dysphagia. Thanks!  Tamera Lopes, CCC-SLP 2022 09:14 CST    Visit Dx:     ICD-10-CM ICD-9-CM   1. Hyponatremia  E87.1 276.1   2. Closed head injury, initial encounter  S09.90XA 959.01   3. Fall, initial encounter  W19.XXXA E888.9   4. Altered mental status, unspecified altered mental status type  R41.82 780.97   5. Delirium  R41.0 780.09   6. Hypokalemia  E87.6 276.8   7. Dysphagia, unspecified type  R13.10 787.20     Patient Active  Problem List   Diagnosis   • Venous insufficiency (chronic) (peripheral)   • Type 2 diabetes mellitus with hyperglycemia (HCC)   • Pure hypercholesterolemia   • Essential (primary) hypertension   • Preoperative evaluation to rule out surgical contraindication   • Abnormal EKG   • Shortness of breath   • 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, estrogen receptor positive (HCC)   • Breast mass   • Hematoma (nontraumatic) of breast   • Altered mental status   • Hyponatremia   • Hyponatremia   • Hypokalemia     Past Medical History:   Diagnosis Date   • Cataracts, bilateral    • Cellulitis of left lower limb    • Cellulitis of right lower limb    • COPD (chronic obstructive pulmonary disease) (HCC)    • DJD (degenerative joint disease), cervical     DJD C Spine/Spondylolysis, Cervical Region   • Essential (primary) hypertension    • GERD (gastroesophageal reflux disease)    • Hematoma     Left mastectomy site   • Hypothyroidism    • Major depressive disorder, recurrent, moderate (HCC)    • Malignant neoplasm of left breast (HCC)    • Migraine    • Morbid (severe) obesity due to excess calories (HCC)    • Obstructive sleep apnea    • PONV (postoperative nausea and vomiting)    • Primary generalized (osteo)arthritis    • Pure hypercholesterolemia    • Tinea unguium    • Type 2 diabetes mellitus with hyperglycemia (HCC)    • Venous insufficiency (chronic) (peripheral)      Past Surgical History:   Procedure Laterality Date   • BREAST ABSCESS INCISION AND DRAINAGE Left 12/21/2021    Procedure: EVACUATION OF LEFT MASTECTOMY HEMATOMA;  Surgeon: Naye Tavarez MD;  Location: [facility];  Service: General;  Laterality: Left;   • BREAST SURGERY Left     Left mastectomy   • CATARACT EXTRACTION, BILATERAL     • CATARACT EXTRACTION, BILATERAL     • ESSURE TUBAL LIGATION     • HYSTERECTOMY     • MASTECTOMY W/ SENTINEL NODE BIOPSY Left 11/30/2021    Procedure: LEFT MASTECTOMY WITH SENTINEL LYMPH NODE  BIOPSY WITH MAGTRACE;  Surgeon: Naye Tavarez MD;  Location: [facility] OR;  Service: General;  Laterality: Left;   • VARICOSE VEIN SURGERY Right        SLP Recommendation and Plan  SLP Swallowing Diagnosis: mild, functional oral phase, functional pharyngeal phase (01/09/22 0820)  SLP Diet Recommendation: soft textures, thin liquids (01/09/22 0820)  Recommended Precautions and Strategies: upright posture during/after eating, small bites of food and sips of liquid (01/09/22 0820)  SLP Rec. for Method of Medication Administration: meds whole, with thin liquids (01/09/22 0820)     Monitor for Signs of Aspiration: yes, notify SLP if any concerns, cough, gurgly voice, throat clearing, pneumonia, right lower lobe infiltrates (01/09/22 0820)     Swallow Criteria for Skilled Therapeutic Interventions Met: no problems identified which require skilled intervention (01/09/22 0820)  Anticipated Discharge Disposition (SLP): skilled nursing facility (01/09/22 0820)     Therapy Frequency (Swallow): evaluation only (01/09/22 0820)                                     Plan of Care Reviewed With: patient, other (see comments) (Yohana JONES. Reinforcements needed for pt.)  Progress:  (Eval)  Outcome Summary: Clinical bedside swallowing evaluation completed per SLP. Pt c/o significant neck pain, RN notified yet reported pt not eligible for medicinal tx at that time. Anterior resting head posture at times, though able to momentarily elevate to a upright head posture. Pt was presented a full range of consistencies excluding mechanical soft. Adequate labial seal on spoon and straw. Mildly decreased rotary chew and prolonged oral prep with regular solid , oral residue minimal and WFL. No overt s/s of aspiration. Confusion at times, pt also c/o hallucinations at times. Cannot fully r/o aspiration, yet feel observed oropharyngeal swallow fx is likely pt's baseline. RECS: Continue current diet of mechanical soft (considering limited dentition  and generalized weakness) with regular/thin liquid consistency; meds whole with thin liquids; may warrant distant supervision with meals; RN to monitor for increased lung congestion. Continued SLP dysphagia services are not warranted at this time. Will plan to complete SMMSE (Standardized Mini-Mental State Examination) later this week for reported cognitive concerns. If changes or new concerns arise in regards to PO toleration, MD to reconsult for dysphagia. Thanks!      SWALLOW EVALUATION (last 72 hours)     SLP Adult Swallow Evaluation     Row Name 01/09/22 0820                   Rehab Evaluation    Document Type evaluation  -TM        Subjective Information complains of; pain  -TM        Patient Observations alert; cooperative  -TM        Patient/Family/Caregiver Comments/Observations No family present.  -TM        Patient Effort adequate  -TM                  General Information    Patient Profile Reviewed yes  -TM        Pertinent History Of Current Problem Acute mental status change, decreased PO intake (though PO reportedly low at baseline), daughter concerned with possible dementia. Hx of L breast CA s/p mastectomy and s/p hematoma with drain.  -TM        Current Method of Nutrition soft textures; thin liquids  -TM        Precautions/Limitations, Vision WFL  -TM        Precautions/Limitations, Hearing WFL  -TM        Prior Level of Function-Communication cognitive-linguistic impairment  -TM        Prior Level of Function-Swallowing no diet consistency restrictions  -TM        Plans/Goals Discussed with patient; other (see comments)  Yohana JONES  -        Barriers to Rehab cognitive status  -TM                  Pain    Additional Documentation Pain Scale: FACES Pre/Post-Treatment (Group)  -TM                  Pain Scale: FACES Pre/Post-Treatment    Pain: FACES Scale, Pretreatment 8-->hurts whole lot  -TM        Posttreatment Pain Rating 8-->hurts whole lot  -TM        Pain Location neck  -TM                   Oral Motor Structure and Function    Dentition Assessment upper dentures/partial in place; other (see comments)  Does not own bottom dentures  -TM        Secretion Management WNL/WFL  -TM        Mucosal Quality moist, healthy  -TM        Volitional Swallow unable to elicit  -TM        Volitional Cough reduced respiratory support  -TM                  Oral Musculature and Cranial Nerve Assessment    Oral Motor General Assessment generalized oral motor weakness; lingual impairment  -TM        Lingual Impairment, Detail. Cranial Nerves IX, XII (Glossopharyngeal and Hypoglossal) reduced strength right; other (see comments)  Mild-mod R lingual deviation  -TM                  General Eating/Swallowing Observations    Respiratory Support Currently in Use room air  -TM        Eating/Swallowing Skills fed by SLP  -TM        Positioning During Eating upright in bed; other (see comments)  Resting chin tucked posture, difficulty maintaining upright head posture  -TM        Utensils Used spoon; straw  -TM        Consistencies Trialed pudding thick; honey-thick liquids; nectar/syrup-thick liquids; thin liquids; regular textures  -TM                  Respiratory    Respiratory Status WFL  -TM                  Clinical Swallow Eval    Oral Prep Phase WFL  -TM        Oral Transit WFL  -TM        Oral Residue WFL  -TM        Pharyngeal Phase WFL  -TM        Esophageal Phase unremarkable  -TM        Clinical Swallow Evaluation Summary See note.  -TM                  SLP Evaluation Clinical Impression    SLP Swallowing Diagnosis mild; functional oral phase; functional pharyngeal phase  -TM        Functional Impact no impact on function  -TM        Swallow Criteria for Skilled Therapeutic Interventions Met no problems identified which require skilled intervention  -TM                  Recommendations    Therapy Frequency (Swallow) evaluation only  -TM        SLP Diet Recommendation soft textures; thin liquids  -TM        Recommended  Precautions and Strategies upright posture during/after eating; small bites of food and sips of liquid  -TM        Oral Care Recommendations Oral Care BID/PRN  -TM        SLP Rec. for Method of Medication Administration meds whole; with thin liquids  -TM        Monitor for Signs of Aspiration yes; notify SLP if any concerns; cough; gurgly voice; throat clearing; pneumonia; right lower lobe infiltrates  -TM        Anticipated Discharge Disposition (SLP) skilled nursing facility  -              User Key  (r) = Recorded By, (t) = Taken By, (c) = Cosigned By    Initials Name Effective Dates    TM Tamera Lopes CCC-SLP 06/16/21 -                 EDUCATION  The patient has been educated in the following areas:   Dysphagia (Swallowing Impairment).              Time Calculation:    Time Calculation- SLP     Row Name 01/09/22 0909             Time Calculation- SLP    SLP Start Time 0820  -TM      SLP Stop Time 0913  -TM      SLP Time Calculation (min) 53 min  -TM      SLP Received On 01/09/22  -TM              Untimed Charges    SLP Eval/Re-eval  ST Eval Oral Pharyng Swallow - 31566  -TM      46896-[id number] Eval Oral Pharyng Swallow Minutes 53  -TM              Total Minutes    Untimed Charges Total Minutes 53  -TM       Total Minutes 53  -TM            User Key  (r) = Recorded By, (t) = Taken By, (c) = Cosigned By    Initials Name Provider Type    TM Tamera Lopes CCC-SLP Speech and Language Pathologist                Therapy Charges for Today     Code Description Service Date Service Provider Modifiers Qty    [id number] HC ST EVAL ORAL PHARYNG SWALLOW 4 1/9/2022 Tamera Lopes CCC-SLP GN 1               RICCARDO Snow  1/9/2022

## 2022-01-09 NOTE — PROGRESS NOTES
[facility] Medicine Services  INPATIENT PROGRESS NOTE    Length of Stay: 1  Date of Admission: 1/8/2022  Primary Care Physician: Pb Mitchell APRN    Subjective   Chief Complaint: Altered mental status.  Nausea vomiting.  Hyponatremia.  Hypokalemia.    HPI   Altered mental status improving.  Nausea vomiting has resolved.  Hyponatremia is improving.  Hypokalemia resolved.  Blood pressure stable patient is afebrile.  Patient asked to be put back on her pain medication.  Discussed patient that is part of her problem, is a chronic pain medication.    Review of Systems   Constitutional: Positive for activity change, appetite change and fatigue. Negative for chills and fever.   HENT: Negative for hearing loss, nosebleeds, tinnitus and trouble swallowing.    Eyes: Negative for visual disturbance.   Respiratory: Negative for cough, chest tightness, shortness of breath and wheezing.    Cardiovascular: Negative for chest pain, palpitations and leg swelling.   Gastrointestinal: Negative for abdominal distention, abdominal pain, blood in stool, constipation, diarrhea, nausea and vomiting.   Endocrine: Negative for cold intolerance, heat intolerance, polydipsia, polyphagia and polyuria.   Genitourinary: Negative for decreased urine volume, difficulty urinating, dysuria, flank pain, frequency and hematuria.   Musculoskeletal: Positive for arthralgias, gait problem and myalgias. Negative for joint swelling.   Skin: Negative for rash.   Allergic/Immunologic: Negative for immunocompromised state.   Neurological: Positive for weakness. Negative for dizziness, syncope, light-headedness and headaches.   Hematological: Negative for adenopathy. Does not bruise/bleed easily.   Psychiatric/Behavioral: Positive for confusion. Negative for sleep disturbance. The patient is not nervous/anxious.           All pertinent negatives and positives are as above. All other systems have been reviewed and are  negative unless otherwise stated.     Objective    Temp:  [97.6 °F (36.4 °C)-98 °F (36.7 °C)] 98 °F (36.7 °C)  Heart Rate:  [] 79  Resp:  [16-22] 20  BP: (116-143)/(71-90) 122/90    Intake/Output Summary (Last 24 hours) at 1/9/2022 1244  Last data filed at 1/9/2022 0301  Gross per 24 hour   Intake 225 ml   Output 100 ml   Net 125 ml     Physical Exam  Vitals and nursing note reviewed.   Constitutional:       Appearance: She is well-developed.   HENT:      Head: Normocephalic.   Eyes:      Conjunctiva/sclera: Conjunctivae normal.      Pupils: Pupils are equal, round, and reactive to light.   Neck:      Vascular: No JVD.   Cardiovascular:      Rate and Rhythm: Normal rate and regular rhythm.      Heart sounds: Normal heart sounds. No murmur heard.  No friction rub. No gallop.    Pulmonary:      Effort: No respiratory distress.      Breath sounds: No wheezing or rales.      Comments: Diminished breath and bilateral, clear .  Chest:      Chest wall: No tenderness.   Abdominal:      General: Bowel sounds are normal. There is no distension.      Palpations: Abdomen is soft.      Tenderness: There is no abdominal tenderness. There is no guarding or rebound.   Musculoskeletal:         General: No tenderness or deformity. Normal range of motion.      Cervical back: Neck supple.   Skin:     General: Skin is warm and dry.      Findings: No rash.      Comments: Status post left breast mastectomy, no sign cellulitis, wound looks clean dry and intact.  Drainage tubes left breast noted with tent blood inside the tube.   Neurological:      General: No focal deficit present.      Mental Status: She is alert.      Cranial Nerves: No cranial nerve deficit.      Motor: Weakness present. No abnormal muscle tone.      Coordination: Coordination abnormal.      Gait: Gait abnormal.      Deep Tendon Reflexes: Reflexes normal.   Psychiatric:         Behavior: Behavior normal.      Results Review:  Lab Results (last 24 hours)      Procedure Component Value Units Date/Time    Basic Metabolic Panel [[id number]]  (Abnormal) Collected: 01/09/22 1134    Specimen: Blood Updated: 01/09/22 1157     Glucose 118 mg/dL      BUN 10 mg/dL      Creatinine 0.52 mg/dL      Sodium 135 mmol/L      Potassium 4.5 mmol/L      Comment: Slight hemolysis detected by analyzer. Results may be affected.        Chloride 98 mmol/L      CO2 28.0 mmol/L      Calcium 8.5 mg/dL      eGFR Non African Amer 114 mL/min/1.73      BUN/Creatinine Ratio 19.2     Anion Gap 9.0 mmol/L     Narrative:      GFR Normal >60  Chronic Kidney Disease <60  Kidney Failure <15      Blood Culture - Blood, Arm, Right [[id number]]  (Normal) Collected: 01/08/22 1045    Specimen: Blood from Arm, Right Updated: 01/09/22 1115     Blood Culture No growth at 24 hours    Blood Culture - Blood, Arm, Right [[id number]]  (Normal) Collected: 01/08/22 1040    Specimen: Blood from Arm, Right Updated: 01/09/22 1115     Blood Culture No growth at 24 hours    Basic Metabolic Panel [[id number]]  (Abnormal) Collected: 01/09/22 0539    Specimen: Blood Updated: 01/09/22 0648     Glucose 92 mg/dL      BUN 11 mg/dL      Creatinine 0.57 mg/dL      Sodium 127 mmol/L      Potassium 3.8 mmol/L      Chloride 92 mmol/L      CO2 28.0 mmol/L      Calcium 8.9 mg/dL      eGFR Non African Amer 103 mL/min/1.73      BUN/Creatinine Ratio 19.3     Anion Gap 7.0 mmol/L     Narrative:      GFR Normal >60  Chronic Kidney Disease <60  Kidney Failure <15      Lipid Panel [[id number]]  (Abnormal) Collected: 01/09/22 0539    Specimen: Blood Updated: 01/09/22 0648     Total Cholesterol 150 mg/dL      Triglycerides 69 mg/dL      HDL Cholesterol 70 mg/dL      LDL Cholesterol  66 mg/dL      VLDL Cholesterol 14 mg/dL      LDL/HDL Ratio 0.95    Narrative:      Cholesterol Reference Ranges  (U.S. Department of Health and Human Services ATP III Classifications)    Desirable          <200 mg/dL  Borderline High    200-239 mg/dL  High Risk          >240  mg/dL      Triglyceride Reference Ranges  (U.S. Department of Health and Human Services ATP III Classifications)    Normal           <150 mg/dL  Borderline High  150-199 mg/dL  High             200-499 mg/dL  Very High        >500 mg/dL    HDL Reference Ranges  (U.S. Department of Health and Human Services ATP III Classifcations)    Low     <40 mg/dl (major risk factor for CHD)  High    >60 mg/dl ('negative' risk factor for CHD)        LDL Reference Ranges  (U.S. Department of Health and Human Services ATP III Classifcations)    Optimal          <100 mg/dL  Near Optimal     100-129 mg/dL  Borderline High  130-159 mg/dL  High             160-189 mg/dL  Very High        >189 mg/dL    TSH [[id number]]  (Normal) Collected: 01/09/22 0539    Specimen: Blood Updated: 01/09/22 0648     TSH 1.180 uIU/mL     Hemoglobin A1c [[id number]]  (Abnormal) Collected: 01/09/22 0539    Specimen: Blood Updated: 01/09/22 0631     Hemoglobin A1C 5.70 %     Narrative:      Hemoglobin A1C Ranges:    Increased Risk for Diabetes  5.7% to 6.4%  Diabetes                     >= 6.5%  Diabetic Goal                < 7.0%    CBC Auto Differential [[id number]]  (Abnormal) Collected: 01/09/22 0539    Specimen: Blood Updated: 01/09/22 0620     WBC 6.63 10*3/mm3      RBC 5.75 10*6/mm3      Hemoglobin 13.2 g/dL      Hematocrit 43.5 %      MCV 75.7 fL      MCH 23.0 pg      MCHC 30.3 g/dL      RDW 16.6 %      RDW-SD 43.6 fl      MPV 9.4 fL      Platelets 284 10*3/mm3      Neutrophil % 69.1 %      Lymphocyte % 16.3 %      Monocyte % 10.6 %      Eosinophil % 3.0 %      Basophil % 0.5 %      Immature Grans % 0.5 %      Neutrophils, Absolute 4.59 10*3/mm3      Lymphocytes, Absolute 1.08 10*3/mm3      Monocytes, Absolute 0.70 10*3/mm3      Eosinophils, Absolute 0.20 10*3/mm3      Basophils, Absolute 0.03 10*3/mm3      Immature Grans, Absolute 0.03 10*3/mm3      nRBC 0.0 /100 WBC     Basic Metabolic Panel [[id number]]  (Abnormal) Collected: 01/09/22 0023     Specimen: Blood Updated: 01/09/22 0058     Glucose 77 mg/dL      BUN 12 mg/dL      Creatinine 0.60 mg/dL      Sodium 126 mmol/L      Potassium 3.8 mmol/L      Chloride 91 mmol/L      CO2 24.0 mmol/L      Calcium 8.7 mg/dL      eGFR Non African Amer 97 mL/min/1.73      BUN/Creatinine Ratio 20.0     Anion Gap 11.0 mmol/L     Narrative:      GFR Normal >60  Chronic Kidney Disease <60  Kidney Failure <15      Basic Metabolic Panel [[id number]]  (Abnormal) Collected: 01/08/22 2017    Specimen: Blood Updated: 01/08/22 2110     Glucose 149 mg/dL      BUN 11 mg/dL      Creatinine 0.71 mg/dL      Sodium 129 mmol/L      Potassium 3.1 mmol/L      Comment: Slight hemolysis detected by analyzer. Results may be affected.        Chloride 89 mmol/L      CO2 27.0 mmol/L      Calcium 9.1 mg/dL      eGFR Non African Amer 80 mL/min/1.73      BUN/Creatinine Ratio 15.5     Anion Gap 13.0 mmol/L     Narrative:      GFR Normal >60  Chronic Kidney Disease <60  Kidney Failure <15      COVID PRE-OP / PRE-PROCEDURE SCREENING ORDER (NO ISOLATION) - Swab, Nasal Cavity [[id number]]  (Normal) Collected: 01/08/22 1618    Specimen: Swab from Nasal Cavity Updated: 01/08/22 1725    Narrative:      The following orders were created for panel order COVID PRE-OP / PRE-PROCEDURE SCREENING ORDER (NO ISOLATION) - Swab, Nasal Cavity.  Procedure                               Abnormality         Status                     ---------                               -----------         ------                     COVID-19,[employer] IN-CHRI[facility]INE...[[id number]]  Normal              Final result                 Please view results for these tests on the individual orders.    COVID-19,[employer] IN-HOUSE,Nasal Swab No Transport Media 3-4 HR TAT - Swab, Nasal Cavity [[id number]]  (Normal) Collected: 01/08/22 1618    Specimen: Swab from Nasal Cavity Updated: 01/08/22 1725     COVID19 Not Detected    Narrative:      Fact sheet for providers: https://www.fda.gov/media/809755/download      Fact sheet for patients: https://www.fda.gov/media/753165/download    Test performed by PCR.    Consider negative results in combination with clinical observations, patient history, and epidemiological information.    Magnesium [[id number]]  (Normal) Collected: 01/08/22 1045    Specimen: Blood from Arm, Right Updated: 01/08/22 1508     Magnesium 1.9 mg/dL            Cultures:  Blood Culture   Date Value Ref Range Status   01/08/2022 No growth at 24 hours  Preliminary   01/08/2022 No growth at 24 hours  Preliminary       Radiology Data:    Imaging Results (Last 24 Hours)     ** No results found for the last 24 hours. **          Allergies   Allergen Reactions   • Augmentin [Amoxicillin-Pot Clavulanate] Other (See Comments)     UNKNOWN   • Ceclor [Cefaclor] Other (See Comments)     UNKNOWN     • Codeine Other (See Comments)     Not Specified   • Darvon [Propoxyphene] Other (See Comments)     Not Specified   • Fulvicin P-G [Griseofulvin] Other (See Comments)     UNKNOWN   • Niacin Other (See Comments)     UNKNOWN   • Valium [Diazepam] Other (See Comments)     UNKNOWN       Scheduled meds:   acetaminophen, 975 mg, Oral, Q8H  docusate sodium, 100 mg, Oral, BID  famotidine, 20 mg, Oral, BID AC  levothyroxine, 75 mcg, Oral, Q AM  potassium chloride, 40 mEq, Oral, BID  sodium chloride, 10 mL, Intravenous, Q12H        PRN meds:  sodium chloride    Assessment/Plan       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 Improving.   CT head. No acute intracranial abnormalities.  CT cervical spine. No acute osseous posttraumatic findings.  Chest x-ray-Mild cardiomegaly, with chronic interstitial lung changes.     Nausea/vomiting. Pepcid. Zofran as needed.     Hyponatremia. Improving. Decrease LR to 50 cc an hour.       Hypokalemia.  Resolved.  Magnesium- normal.        History of neoplasm of the left breast.  Status post mastectomy of the left breast on November 30, 2021.  Status post hematoma evacuation December 21, 2021, AILYN tube is in place left breast.  Consult Dr. Tavarez tomorrow morning.  UDS positive for oxycodone.     Hypertension.  Hold Maxide and Diovan for now due to hypotension.     Constipation.  Continue Colace.     Hypothyroidism.  Continue Synthroid.     Chronic pain.  Hold oxycodone for now. Percocet prn .      Discharge Planning: Patient will need rehab placement.  DNR.  DNI.    Electronically signed by Thang Aceves MD, 01/09/22, 12:44 PM CST.

## 2022-01-10 NOTE — PROGRESS NOTES
[facility] Medicine Services  INPATIENT PROGRESS NOTE    Patient Name: Alissa Dominguez  Date of Admission: 1/8/2022  Today's Date: 01/10/22  Length of Stay: 2  Primary Care Physician: Pb Mitchell APRN    Subjective   Chief Complaint: Follow-up altered mental status/hyponatremia    HPI   Patient seems to be doing better.  Seen after dinner.  She ate well.  No issues with p.o. intake.  Denies overt pain right now.  Feels more comfortable after having breast AILYN drain removed.    Review of Systems   All pertinent negatives and positives are as above. All other systems have been reviewed and are negative unless otherwise stated.     Objective    Temp:  [97.3 °F (36.3 °C)-98.6 °F (37 °C)] 97.3 °F (36.3 °C)  Heart Rate:  [] 100  Resp:  [16-18] 18  BP: (107-122)/(64-92) 119/80  Physical Exam  GEN: Awake, alert, interactive, in NAD  HEENT:  PERRLA, EOMI, Anicteric, Trachea midline  Lungs: no wheezing/rales/rhonchi  Heart: RRR, +S1/s2, no rub  ABD: soft, nt/nd, +BS, no guarding/rebound  Extremities:  no cyanosis, no edema  Skin: no petechiae  Neuro: BARRIENTOS, no focal deficits  Psych: normal mood & affect        Results Review:  I have reviewed the labs, radiology results, and diagnostic studies.    Laboratory Data:   Results from last 7 days   Lab Units 01/10/22  1128 01/09/22 0539 01/08/22  1045   WBC 10*3/mm3 7.24 6.63 6.95   HEMOGLOBIN g/dL 13.5 13.2 13.4   HEMATOCRIT % 42.9 43.5 42.9   PLATELETS 10*3/mm3 300 284 298        Results from last 7 days   Lab Units 01/10/22  1128 01/09/22  1134 01/09/22  0539 01/08/22 2017 01/08/22  1045   SODIUM mmol/L 132* 135* 127*   < > 129*   POTASSIUM mmol/L 5.3* 4.5 3.8   < > 2.9*   CHLORIDE mmol/L 96* 98 92*   < > 89*   CO2 mmol/L 28.0 28.0 28.0   < > 29.0   BUN mg/dL 8 10 11   < > 12   CREATININE mg/dL 0.64 0.52* 0.57   < > 0.61   CALCIUM mg/dL 9.0 8.5* 8.9   < > 9.2   BILIRUBIN mg/dL  --   --   --   --  0.8   ALK PHOS U/L  --   --   --    --  57   ALT (SGPT) U/L  --   --   --   --  13   AST (SGOT) U/L  --   --   --   --  36*   GLUCOSE mg/dL 109* 118* 92   < > 140*    < > = values in this interval not displayed.       Culture Data:   Blood Culture   Date Value Ref Range Status   01/08/2022 No growth at 2 days  Preliminary   01/08/2022 No growth at 2 days  Preliminary       Radiology Data:   Imaging Results (Last 24 Hours)     ** No results found for the last 24 hours. **          I have reviewed the patient's current medications.     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  I think there was some concern on arrival of potential medication side effect from pain meds versus hyponatremia.  These have been weaned down.  No obvious signs of active infection.  Afebrile.    #2 hyponatremia -improved with IV fluids from 127 up to 135.  Was back down to 132 this morning.  Repeat labs in the morning.  Will DC fluids monitor off.  Of note patient is also on Maxide and Diovan at home which likely did not help her sodium status.    #3 essential hypertension -blood pressure currently doing fine off meds, monitor    #4 breast neoplasm -that is post cystectomy and lymph node biopsy.  AILYN drain removed by surgery today.  Recommend outpatient follow-up in a month.    #5 hypokalemia -improved and now hyperkalemic at 5.3.  DC twice daily placement.  Recheck in the morning.  Renal function/creatinine clearance normal.      Discharge Planning: Plan for SNF at discharge.  CM working on placement.    Electronically signed by Kenn Moore DO, 01/10/22, 16:50 CST.

## 2022-01-10 NOTE — PLAN OF CARE
Goal Outcome Evaluation:            A and disoriented to place and situation.  Confused.  2 L NC.  Desats with ambulation with recovery.  .  Tele in place. NS.  Scheduled Tylenol given as ordered with good results.  SCD.  Up x2 to bsc.  Surgery consult today for suction bulb drain to lt breast.  IVF infusing as ordered.  Resting well tonight.  Scattered bruising noted.  Pulling at times.   Will continue to monitor.

## 2022-01-10 NOTE — THERAPY EVALUATION
Patient Name: Alissa Dominguez  : 1945    MRN: [id number]                              Today's Date: 1/10/2022       Admit Date: 2022    Visit Dx:     ICD-10-CM ICD-9-CM   1. Hyponatremia  E87.1 276.1   2. Closed head injury, initial encounter  S09.90XA 959.01   3. Fall, initial encounter  W19.XXXA E888.9   4. Altered mental status, unspecified altered mental status type  R41.82 780.97   5. Delirium  R41.0 780.09   6. Hypokalemia  E87.6 276.8   7. Dysphagia, unspecified type  R13.10 787.20   8. Impaired mobility  Z74.09 799.89   9. Impaired mobility and ADLs  Z74.09 V49.89    Z78.9      Patient Active Problem List   Diagnosis   • Venous insufficiency (chronic) (peripheral)   • Type 2 diabetes mellitus with hyperglycemia (HCC)   • Pure hypercholesterolemia   • Essential (primary) hypertension   • Preoperative evaluation to rule out surgical contraindication   • Abnormal EKG   • Shortness of breath   • 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, estrogen receptor positive ([facility])   • Breast mass   • Hematoma (nontraumatic) of breast   • Altered mental status   • Hyponatremia   • Hyponatremia   • Hypokalemia     Past Medical History:   Diagnosis Date   • Cataracts, bilateral    • Cellulitis of left lower limb    • Cellulitis of right lower limb    • COPD (chronic obstructive pulmonary disease) ([facility])    • DJD (degenerative joint disease), cervical     DJD C Spine/Spondylolysis, Cervical Region   • Essential (primary) hypertension    • GERD (gastroesophageal reflux disease)    • Hematoma     Left mastectomy site   • Hypothyroidism    • Major depressive disorder, recurrent, moderate ([facility])    • Malignant neoplasm of left breast ([facility])    • Migraine    • Morbid (severe) obesity due to excess calories ([facility])    • Obstructive sleep apnea    • PONV (postoperative nausea and vomiting)    • Primary generalized (osteo)arthritis    • Pure hypercholesterolemia    • Tinea unguium    • Type 2 diabetes  mellitus with hyperglycemia (HCC)    • Venous insufficiency (chronic) (peripheral)      Past Surgical History:   Procedure Laterality Date   • BREAST ABSCESS INCISION AND DRAINAGE Left 12/21/2021    Procedure: EVACUATION OF LEFT MASTECTOMY HEMATOMA;  Surgeon: Naye Tavarez MD;  Location:  [facility] OR;  Service: General;  Laterality: Left;   • BREAST SURGERY Left     Left mastectomy   • CATARACT EXTRACTION, BILATERAL     • CATARACT EXTRACTION, BILATERAL     • ESSURE TUBAL LIGATION     • HYSTERECTOMY     • MASTECTOMY W/ SENTINEL NODE BIOPSY Left 11/30/2021    Procedure: LEFT MASTECTOMY WITH SENTINEL LYMPH NODE BIOPSY WITH MAGTRACE;  Surgeon: Naye Tavarez MD;  Location:  [facility] OR;  Service: General;  Laterality: Left;   • VARICOSE VEIN SURGERY Right       General Information     Row Name 01/10/22 1525          OT Time and Intention    Document Type evaluation  AMS, recent L mastectomy  -MM     Mode of Treatment occupational therapy  -MM     Row Name 01/10/22 1525          General Information    Patient Profile Reviewed yes  -MM     Prior Level of Function independent:; all household mobility; ADL's  -MM     Existing Precautions/Restrictions fall  -MM     Barriers to Rehab cognitive status  -MM     Row Name 01/10/22 1525          Living Environment    Lives With alone  walk in shower but pt reports she sponge bathes, otherwise unsure of living environment 2' intermittent confusion  -MM     Row Name 01/10/22 1525          Cognition    Orientation Status (Cognition) oriented x 4; other (see comments)  conversationally confused  -MM     Row Name 01/10/22 1525          Safety Issues, Functional Mobility    Safety Issues Affecting Function (Mobility) insight into deficits/self-awareness; awareness of need for assistance; sequencing abilities; judgment; impulsivity  -MM     Impairments Affecting Function (Mobility) endurance/activity tolerance; strength  -MM           User Key  (r) = Recorded By, (t) = Taken By,  (c) = Cosigned By    Initials Name Provider Type    MM Jama Ruelas, OTR/L Occupational Therapist                 Mobility/ADL's     Row Name 01/10/22 1525          Bed Mobility    Bed Mobility supine-sit; sit-supine; scooting/bridging  -MM     Scooting/Bridging [place] (Bed Mobility) maximum assist (25% patient effort); 2 person assist; verbal cues  -MM     Supine-Sit [place] (Bed Mobility) contact guard; verbal cues  -MM     Sit-Supine [place] (Bed Mobility) moderate assist (50% patient effort); verbal cues  for BLE  -MM     Assistive Device (Bed Mobility) head of bed elevated; bed rails  -MM     Row Name 01/10/22 1525          Transfers    Transfers sit-stand transfer; toilet transfer  -MM     Sit-Stand [place] (Transfers) contact guard; verbal cues; nonverbal cues (demo/gesture)  -MM     [place] Level (Toilet Transfer) contact guard; verbal cues  -MM     Assistive Device (Toilet Transfer) walker, front-wheeled; grab bars/safety frame  -MM     Row Name 01/10/22 1525          Sit-Stand Transfer    Assistive Device (Sit-Stand Transfers) walker, front-wheeled  -MM     Row Name 01/10/22 1525          Toilet Transfer    Type (Toilet Transfer) sit-stand; stand-sit  -MM     Row Name 01/10/22 1525          Functional Mobility    Functional Mobility- Ind. Level contact guard assist; verbal cues required  -MM     Functional Mobility- Device rolling walker  -MM     Functional Mobility- Safety Issues step length decreased  -MM     Functional Mobility- Comment increased time and effort of task, fatigues quickly  -MM     Row Name 01/10/22 1525          Activities of Daily Living    BADL Assessment/Intervention toileting; lower body dressing  -MM     Row Name 01/10/22 1525          Toileting Assessment/Training    [place] Level (Toileting) supervision; verbal cues; set up; toileting skills; nonverbal cues (demo/gesture)  -MM     Position (Toileting) unsupported sitting; supported standing  -MM      Row Name 01/10/22 1525          Lower Body Dressing Assessment/Training    [place] Level (Lower Body Dressing) socks; minimum assist (75% patient effort); verbal cues  adjust socks  -MM     Position (Lower Body Dressing) edge of bed sitting  -MM           User Key  (r) = Recorded By, (t) = Taken By, (c) = Cosigned By    Initials Name Provider Type    Jama Christie, OTR/L Occupational Therapist               Obj/Interventions     Row Name 01/10/22 1525          Sensory Assessment (Somatosensory)    Sensory Assessment (Somatosensory) sensation intact  -MM     [facility] Name 01/10/22 1525          Range of Motion Comprehensive    General Range of Motion bilateral upper extremity ROM WNL  -MM     Comment, General Range of Motion limited neck extension noted once sitting EOB. Pt reports this is baseline  -MM     [facility] Name 01/10/22 1525          Strength Comprehensive (MMT)    Comment, General Manual Muscle Testing (MMT) Assessment grossly 4/5  -MM     [facility] Name 01/10/22 1525          Balance    Balance Assessment sitting static balance; sitting dynamic balance; standing static balance; standing dynamic balance  -MM     Static Sitting Balance WFL  -MM     Dynamic Sitting Balance WFL  -MM     Static Standing Balance mild impairment; supported; standing  -MM     Dynamic Standing Balance mild impairment; supported; asymmetrical weight shifting  -MM           User Key  (r) = Recorded By, (t) = Taken By, (c) = Cosigned By    Initials Name Provider Type    Jama Christie, OTR/L Occupational Therapist               Goals/Plan     Row Name 01/10/22 1525          Bathing Goal 1 (OT)    Activity/Device (Bathing Goal 1, OT) bathing skills, all  sponge bath  -MM     [place] Level/Cues Needed (Bathing Goal 1, OT) supervision required; set-up required; verbal cues required  -MM     Time Frame (Bathing Goal 1, OT) long term goal (LTG); by discharge  -MM     Progress/Outcomes (Bathing Goal 1, OT) goal ongoing  -MM      Row Name 01/10/22 1525          Dressing Goal 1 (OT)    Activity/Device (Dressing Goal 1, OT) dressing skills, all  -MM     [place]/Cues Needed (Dressing Goal 1, OT) supervision required; set-up required  -MM     Time Frame (Dressing Goal 1, OT) long term goal (LTG); by discharge  -MM     Progress/Outcome (Dressing Goal 1, OT) goal ongoing  -MM     [facility] Name 01/10/22 1525          Toileting Goal 1 (OT)    Activity/Device (Toileting Goal 1, OT) toileting skills, all  -MM     [place] Level/Cues Needed (Toileting Goal 1, OT) independent  -MM     Time Frame (Toileting Goal 1, OT) long term goal (LTG); by discharge  -MM     Progress/Outcome (Toileting Goal 1, OT) goal ongoing  -MM     Row Name 01/10/22 1525          Therapy Assessment/Plan (OT)    Planned Therapy Interventions (OT) activity tolerance training; adaptive equipment training; BADL retraining; cognitive/visual perception retraining; functional balance retraining; IADL retraining; neuromuscular control/coordination retraining; occupation/activity based interventions; patient/caregiver education/training; ROM/therapeutic exercise; strengthening exercise; transfer/mobility retraining; passive ROM/stretching  -MM           User Key  (r) = Recorded By, (t) = Taken By, (c) = Cosigned By    Initials Name Provider Type    MM Jama Ruelas, OTR/L Occupational Therapist               Clinical Impression     Row Name 01/10/22 1525          Pain Assessment    Additional Documentation Pain Scale: Numbers Pre/Post-Treatment (Group)  -MM     Row Name 01/10/22 1525          Pain Scale: Numbers Pre/Post-Treatment    Pretreatment Pain Rating 0/10 - no pain  -MM     Posttreatment Pain Rating 0/10 - no pain  -MM     Pain Intervention(s) Repositioned; Ambulation/increased activity  -MM     [facility] Name 01/10/22 1525          Plan of Care Review    Plan of Care Reviewed With patient  -MM     Progress no change  -MM     Outcome Summary OT evaluation completed. pt is alert  and oriented x4. Pt reports no pain or complaints. Pt pleasantly, conversationally confused. Pt with intermittent word finding difficulties. pt with decreased safety awareness. pt becomes emotional when discussing her recent surgery. Pt reports living at home alone and having a walk in shower, but sponge bathing. But unable to provide other living envrionment information. pt was min A to adjust socks. pt was Supervision with verbal and tactile cues for toileting. pt was CGA for supine to sit, mod A for sit to supine to assist BLE, max A x2 for scooting in bed. Pt was CGA for t/f and functional mobility with RW but fatigues very easily. Pt requires additional time and effort for all tasks. Pt requries verbal and tactile cues for proper RW use. Skilled OT recommended to address adls, functional mobility and education. Recommended d/c SNF for further therapy.  -MM     Row Name 01/10/22 1525          Therapy Assessment/Plan (OT)    Patient/Family Therapy Goal Statement (OT) return home  -MM     Rehab Potential (OT) good, to achieve stated therapy goals  -MM     Criteria for Skilled Therapeutic Interventions Met (OT) yes; skilled treatment is necessary  -MM     Therapy Frequency (OT) 5 times/wk  -MM     Predicted Duration of Therapy Intervention (OT) until d/c  -MM     Row Name 01/10/22 1525          Therapy Plan Review/Discharge Plan (OT)    Anticipated Discharge Disposition (OT) skilled nursing facility  -MM     Row Name 01/10/22 1526          Vital Signs    O2 Delivery Pre Treatment room air  -MM     O2 Delivery Intra Treatment room air  -MM     Post SpO2 (%) 92  -MM     O2 Delivery Post Treatment room air  pt took off pulse ox before OT entered the room, NC in floor beside pt as well. PCA notified. Attempted to call RN.  -MM     Pre Patient Position Supine  -MM     Intra Patient Position Standing  -MM     Post Patient Position Supine  -MM     Row Name 01/10/22 1527          Positioning and Restraints    Pre-Treatment  Position in bed  -MM     Post Treatment Position bed  -MM     In Bed fowlers; call light within reach; encouraged to call for assist; side rails up x2; exit alarm on  -MM           User Key  (r) = Recorded By, (t) = Taken By, (c) = Cosigned By    Initials Name Provider Type    Jama Christie, OTR/L Occupational Therapist               Outcome Measures     Row Name 01/10/22 1525          How much help from another is currently needed...    Putting on and taking off regular lower body clothing? 3  -MM     Bathing (including washing, rinsing, and drying) 2  -MM     Toileting (which includes using toilet bed pan or urinal) 3  -MM     Putting on and taking off regular upper body clothing 3  -MM     Taking care of personal grooming (such as brushing teeth) 3  -MM     Eating meals 4  -MM     AM-PAC 6 Clicks Score (OT) 18  -MM     Row Name 01/10/22 0800          How much help from another person do you currently need...    Turning from your back to your side while in flat bed without using bedrails? 3  -MS     Moving from lying on back to sitting on the side of a flat bed without bedrails? 3  -MS     Moving to and from a bed to a chair (including a wheelchair)? 3  -MS     Standing up from a chair using your arms (e.g., wheelchair, bedside chair)? 3  -MS     Climbing 3-5 steps with a railing? 1  -MS     To walk in hospital room? 3  -MS     AM-PAC 6 Clicks Score (PT) 16  -MS     Row Name 01/10/22 1525 01/10/22 0800       Functional Assessment    Outcome Measure Options AM-PAC 6 Clicks Daily Activity (OT)  -MM AM-PAC 6 Clicks Basic Mobility (PT)  -MS          User Key  (r) = Recorded By, (t) = Taken By, (c) = Cosigned By    Initials Name Provider Type    Debora Perez, PT, DPT, NCS Physical Therapist    Jama Christie, OTR/L Occupational Therapist                Occupational Therapy Education                 Title: PT OT SLP Therapies (In Progress)     Topic: Occupational Therapy (In Progress)     Point: ADL  training (In Progress)     Description:   Instruct learner(s) on proper safety adaptation and remediation techniques during self care or transfers.   Instruct in proper use of assistive devices.              Learning Progress Summary           Patient Acceptance, E, NR by MM at 1/10/2022 1708    Comment: OT role, benefits, POC, d/c planning, need for assist, RW safety                   Point: Home exercise program (Not Started)     Description:   Instruct learner(s) on appropriate technique for monitoring, assisting and/or progressing therapeutic exercises/activities.              Learner Progress:  Not documented in this visit.          Point: Precautions (In Progress)     Description:   Instruct learner(s) on prescribed precautions during self-care and functional transfers.              Learning Progress Summary           Patient Acceptance, E, NR by MM at 1/10/2022 1708    Comment: OT role, benefits, POC, d/c planning, need for assist, RW safety                   Point: Body mechanics (In Progress)     Description:   Instruct learner(s) on proper positioning and spine alignment during self-care, functional mobility activities and/or exercises.              Learning Progress Summary           Patient Acceptance, E, NR by MM at 1/10/2022 1708    Comment: OT role, benefits, POC, d/c planning, need for assist, RW safety                               User Key     Initials Effective Dates Name Provider Type Discipline     06/16/21 -  Jama Ruelas, OTR/L Occupational Therapist OT              OT Recommendation and Plan  Planned Therapy Interventions (OT): activity tolerance training, adaptive equipment training, BADL retraining, cognitive/visual perception retraining, functional balance retraining, IADL retraining, neuromuscular control/coordination retraining, occupation/activity based interventions, patient/caregiver education/training, ROM/therapeutic exercise, strengthening exercise, transfer/mobility  retraining, passive ROM/stretching  Therapy Frequency (OT): 5 times/wk  Plan of Care Review  Plan of Care Reviewed With: patient  Progress: no change  Outcome Summary: OT evaluation completed. pt is alert and oriented x4. Pt reports no pain or complaints. Pt pleasantly, conversationally confused. Pt with intermittent word finding difficulties. pt with decreased safety awareness. pt becomes emotional when discussing her recent surgery. Pt reports living at home alone and having a walk in shower, but sponge bathing. But unable to provide other living envrionment information. pt was min A to adjust socks. pt was Supervision with verbal and tactile cues for toileting. pt was CGA for supine to sit, mod A for sit to supine to assist BLE, max A x2 for scooting in bed. Pt was CGA for t/f and functional mobility with RW but fatigues very easily. Pt requires additional time and effort for all tasks. Pt requries verbal and tactile cues for proper RW use. Skilled OT recommended to address adls, functional mobility and education. Recommended d/c SNF for further therapy.     Time Calculation:    Time Calculation- OT     Row Name 01/10/22 1525             Time Calculation- OT    OT Start Time 1525  +8 minutes chart review  -MM      OT Stop Time 1610  -MM      OT Time Calculation (min) 45 min  -MM      OT Received On 01/10/22  -MM      OT Goal Re-Cert Due Date 01/20/22  -MM            User Key  (r) = Recorded By, (t) = Taken By, (c) = Cosigned By    Initials Name Provider Type    MM Jama Ruelas, OTR/L Occupational Therapist              Therapy Charges for Today     Code Description Service Date Service Provider Modifiers Qty    [id number]  OT EVAL MOD COMPLEXITY 4 1/10/2022 Jama Ruelas, OTR/L GO 1               Jama Ruelas OTR/JAGDISH  1/10/2022

## 2022-01-10 NOTE — PLAN OF CARE
Problem: Adult Inpatient Plan of Care  Goal: Plan of Care Review  Recent Flowsheet Documentation  Taken 1/10/2022 1525 by Jama Ruelas, OTR/L  Progress: no change  Plan of Care Reviewed With: patient  Outcome Summary: OT evaluation completed. pt is alert and oriented x4. Pt reports no pain or complaints. Pt pleasantly, conversationally confused. Pt with intermittent word finding difficulties. pt with decreased safety awareness. pt becomes emotional when discussing her recent surgery. Pt reports living at home alone and having a walk in shower, but sponge bathing. But unable to provide other living envrionment information. pt was min A to adjust socks. pt was Supervision with verbal and tactile cues for toileting. pt was CGA for supine to sit, mod A for sit to supine to assist BLE, max A x2 for scooting in bed. Pt was CGA for t/f and functional mobility with RW but fatigues very easily. Pt requires additional time and effort for all tasks. Pt requries verbal and tactile cues for proper RW use. Skilled OT recommended to address adls, functional mobility and education. Recommended d/c SNF for further therapy.

## 2022-01-10 NOTE — CASE MANAGEMENT/SOCIAL WORK
Discharge Planning Assessment  [facility]     Patient Name: Alissa Dominguez  MRN: [id number]  Today's Date: 1/10/2022    Admit Date: 1/8/2022     Discharge Needs Assessment     Row Name 01/10/22 1057       Living Environment    Lives With alone    Current Living Arrangements home/apartment/condo    Primary Care Provided by self    Provides Primary Care For no one, unable/limited ability to care for self    Family Caregiver if Needed none    Quality of Family Relationships supportive; involved; helpful    Able to Return to Prior Arrangements no       Resource/Environmental Concerns    Resource/Environmental Concerns none       Transition Planning    Patient/Family Anticipates Transition to long-term care facility    Patient/Family Anticipated Services at Transition skilled nursing       Discharge Needs Assessment    Readmission Within the Last 30 Days no previous admission in last 30 days    Equipment Currently Used at Home walker, rolling  had O2 but does not wear it    Concerns to be Addressed basic needs; discharge planning; cognitive/perceptual    Anticipated Changes Related to Illness inability to care for self    Outpatient/Agency/Support Group Needs skilled nursing facility    Discharge Facility/Level of Care Needs nursing facility, skilled    Provided Post Acute Provider List? Yes    Post Acute Provider List Nursing Home    Provided Post Acute Provider Quality & Resource List? Yes    Post Acute Provider Quality and Resource List Nursing Home    Delivered To Support Person    Method of Delivery Telephone               Discharge Plan     Row Name 01/10/22 1102       Plan    Plan Referral to [facility]    Patient/Family in Agreement with Plan yes    Plan Comments Spoke with pt's dtr- Kassidy [phone] to assess for home needs.  Pt lived at home alone prior to admission.  Pt has RX coverage/PCP.  Pt needing placement and dtr prefers [facility], informed [facility] of referral.  Pt will need ins. precert before admission and  they are limited on beds at present.  Dtr's 2nd choice is [facility] but they do not take pt's ins. Will follow.    [facility] chris Atkinson informed this SW that they will have bed available for pt on 1/13, she will need ins. precert before admission. Will follow.               Continued Care and Services - Admitted Since 1/8/2022    Coordination has not been started for this encounter.     Selected Continued Care - Prior Encounters Includes selections from prior encounters from 10/10/2021 to 1/10/2022    Discharged on 12/4/2021 Admission date: 11/30/2021 - Discharge disposition: Skilled Nursing Facility (DC - External)    Destination     Service Provider Selected Services Address Phone Fax Patient Preferred    [facility]  Skilled Nursing [address] [place] [id number] [phone] --                       Demographic Summary    No documentation.                Functional Status    No documentation.                Psychosocial    No documentation.                Abuse/Neglect    No documentation.                Legal    No documentation.                Substance Abuse    No documentation.                Patient Forms    No documentation.                   JERMAINE Wyman

## 2022-01-10 NOTE — PLAN OF CARE
Goal Outcome Evaluation:  Plan of Care Reviewed With: patient        Progress: no change  Outcome Summary: The patient presents alert and oriented x4 but confused at times in conversation. She demonstrates generalized weakness and difficulty with attending to tasks. She was able to walk to the bathroom and to the chair with use of a walker. She does required assist with directing the walker. The patient was on 2L when I entered the room and I removed for mobility to the restroom. She desaturated to 87% so 2L was placed back on her and she increased to 94%. PT to continue to work with the patient on strengthening and increased activity. Recommend discharge SNF or home with 24/7 care.

## 2022-01-10 NOTE — PAYOR COMM NOTE
"FROM: GABRIEL LOPEZ  PHONE: [phone]  FAX: [phone]    PENDING: [id number]    Karly Dominguez (76 y.o. Female)             Date of Birth Social Security Number Address Home Phone MRN    1945  [address] [facility] [address] [place] [id number] [phone] [id number]    [facility]vent Marital Status             [place]        Admission Date Admission Type Admitting Provider Attending Provider Department, Room/Bed    1/8/22 Emergency Kenn Moore, Kenn Lagunas,  [facility] 3A, 349/1    Discharge Date Discharge Disposition Discharge Destination                         Attending Provider: Kenn Moore,     Allergies: Augmentin [Amoxicillin-pot Clavulanate], Ceclor [Cefaclor], Codeine, Darvon [Propoxyphene], Fulvicin P-g [Griseofulvin], Niacin, Valium [Diazepam]    Isolation: None   Infection: None   Code Status: No CPR   Advance Care Planning Activity    Ht: 157.5 cm (62\")   Wt: 89.9 kg (198 lb 3.1 oz)    Admission Cmt: None   Principal Problem: None                Active Insurance as of 1/8/2022     Primary Coverage     Payor Plan Insurance Group Employer/Plan Group    ANTH MEDICARE REPLACEMENT ANTH MEDICARE ADVANTAGE [id number]     Payor Plan Address Payor Plan Phone Number Payor Plan Fax Number Effective Dates    [address] [phone]  1/1/2015 - None Entered    [facility] [address]       Subscriber Name Subscriber Birth Date Member ID       KARLY DOMINGUEZ 1945 [id number]                 Emergency Contacts      (Rel.) Home Phone Work Phone Mobile Phone    Kassidy Lind (Daughter) [phone] -- [phone]               History & Physical      Thang Aceves MD at 01/08/22 [address] [place] [address] Medicine Services  HISTORY AND PHYSICAL    Date of Admission: 1/8/2022  Primary Care Physician: Pb Mitchell APRN    Subjective     Chief Complaint: Altered mental status.    History of Present " Illness  Patient 76-year-old  female came to ER secondary to altered mental status.  Altered mental status been going for the last week.  Patient's also been following and has not been eating well.  Daughter stated she does not eat well anyway and her baseline.  She believes that her mom has dementia.  Patient is usually forgetful short-term memory is at baseline.  Patient used to get around with a walker.  Patient lives by herself.  Status post hematoma drainage from left breast about November 30, 2011 ago by Dr. Gamez.  Left-sided drainage tube was placed about 12/21/2021.  Patient still to follow-up with Dr Gamez last week to have the drainage tube removed but did not make the appointment.  Patient daughter requested that general surgery see her inpatient.  Laboratory in ER shows low sodium and low potassium.  Positive for oxycodone UDS.  No sign of pneumonia or urinary tract infection at this time.     Discussed with the daughter and the mom possibly stop pain medication for now until she reaches more alert.  Patient got very angry..  Plan for electrolyte replacement and IV hydration discussed with daughter.    Review of Systems   Constitutional: Positive for activity change, appetite change and fatigue. Negative for chills and fever.   HENT: Negative for hearing loss, nosebleeds, tinnitus and trouble swallowing.    Eyes: Negative for visual disturbance.   Respiratory: Negative for cough, chest tightness, shortness of breath and wheezing.    Cardiovascular: Negative for chest pain, palpitations and leg swelling.   Gastrointestinal: Negative for abdominal distention, abdominal pain, blood in stool, constipation, diarrhea, nausea and vomiting.   Endocrine: Negative for cold intolerance, heat intolerance, polydipsia, polyphagia and polyuria.   Genitourinary: Negative for decreased urine volume, difficulty urinating, dysuria, flank pain, frequency and hematuria.   Musculoskeletal: Positive for  arthralgias, gait problem and myalgias. Negative for joint swelling.   Skin: Negative for rash.   Allergic/Immunologic: Negative for immunocompromised state.   Neurological: Positive for weakness. Negative for dizziness, syncope, light-headedness and headaches.   Hematological: Negative for adenopathy. Does not bruise/bleed easily.   Psychiatric/Behavioral: Positive for confusion. Negative for sleep disturbance. The patient is not nervous/anxious.         Otherwise complete ROS reviewed and negative except as mentioned in the HPI.      Past Medical History:   Past Medical History:   Diagnosis Date   • Cataracts, bilateral    • Cellulitis of left lower limb    • Cellulitis of right lower limb    • COPD (chronic obstructive pulmonary disease) ([facility])    • DJD (degenerative joint disease), cervical     DJD C Spine/Spondylolysis, Cervical Region   • Essential (primary) hypertension    • GERD (gastroesophageal reflux disease)    • Hematoma     Left mastectomy site   • Hypothyroidism    • Major depressive disorder, recurrent, moderate ([facility])    • Malignant neoplasm of left breast ([facility])    • Migraine    • Morbid (severe) obesity due to excess calories ([facility])    • Obstructive sleep apnea    • PONV (postoperative nausea and vomiting)    • Primary generalized (osteo)arthritis    • Pure hypercholesterolemia    • Tinea unguium    • Type 2 diabetes mellitus with hyperglycemia ([facility])    • Venous insufficiency (chronic) (peripheral)        Past Surgical History:  Past Surgical History:   Procedure Laterality Date   • BREAST ABSCESS INCISION AND DRAINAGE Left 12/21/2021    Procedure: EVACUATION OF LEFT MASTECTOMY HEMATOMA;  Surgeon: Naye Tavarez MD;  Location: [facility];  Service: General;  Laterality: Left;   • BREAST SURGERY Left     Left mastectomy   • CATARACT EXTRACTION, BILATERAL     • CATARACT EXTRACTION, BILATERAL     • ESSURE TUBAL LIGATION     • HYSTERECTOMY     • MASTECTOMY W/ SENTINEL NODE BIOPSY Left 11/30/2021     Procedure: LEFT MASTECTOMY WITH SENTINEL LYMPH NODE BIOPSY WITH MAGTRACE;  Surgeon: Naye Tavarez MD;  Location: [facility];  Service: General;  Laterality: Left;   • VARICOSE VEIN SURGERY Right        Family History: family history includes Heart disease in her mother; Hypertension in her sister.    Social History:  reports that she has been smoking electronic cigarette. She has been smoking about 1.00 pack per day. She has never used smokeless tobacco. She reports that she does not drink alcohol and does not use drugs.    Medications:  Prior to Admission medications    Medication Sig Start Date End Date Taking? Authorizing Provider   acetaminophen (Tylenol) 325 MG tablet Take 3 tablets by mouth Every 8 (Eight) Hours. Take every 8 hours for 3 days then take prn as needed. 12/3/21 12/3/22  Naye Tavarez MD   docusate sodium (COLACE) 100 MG capsule Take 100 mg by mouth 2 (Two) Times a Day.    ProviderChelsie MD   levothyroxine (Euthyrox) 75 MCG tablet Take 75 mcg by mouth Daily.    ProviderChelsie MD   ondansetron (Zofran) 4 MG tablet Take 1 tablet by mouth Every 8 (Eight) Hours As Needed for Nausea. 12/3/21 12/3/22  Naye Tavarez MD   ondansetron (Zofran) 4 MG tablet Take 1 tablet by mouth Every 8 (Eight) Hours As Needed for Nausea. 12/21/21 12/21/22  Naye Tavarez MD   oxyCODONE (Roxicodone) 5 MG immediate release tablet Take 1 tablet by mouth Every 8 (Eight) Hours As Needed for Severe Pain . 12/21/21 12/21/22  Naye Tavarez MD   oxyCODONE-acetaminophen (PERCOCET)  MG per tablet Take 1 tablet by mouth Every 8 (Eight) Hours As Needed for Moderate Pain .    ProviderChelsie MD   triamterene-hydrochlorothiazide (MAXZIDE) 75-50 MG per tablet Take 1 tablet by mouth Daily.    Chelsie Barksdale MD   valsartan (DIOVAN) 80 MG tablet Take 80 mg by mouth 2 (Two) Times a Day.    Chelsie Barksdale MD     Allergies:  Allergies   Allergen Reactions   • Augmentin  "[Amoxicillin-Pot Clavulanate] Other (See Comments)     UNKNOWN   • Ceclor [Cefaclor] Other (See Comments)     UNKNOWN     • Codeine Other (See Comments)     Not Specified   • Darvon [Propoxyphene] Other (See Comments)     Not Specified   • Fulvicin P-G [Griseofulvin] Other (See Comments)     UNKNOWN   • Niacin Other (See Comments)     UNKNOWN   • Valium [Diazepam] Other (See Comments)     UNKNOWN       Objective     Vital Signs: /45   Pulse 88   Temp 97.8 °F (36.6 °C) (Oral)   Resp 20   Ht 157.5 cm (62\")   Wt 89.4 kg (197 lb)   LMP  (LMP Unknown)   SpO2 91%   BMI 36.03 kg/m²   Physical Exam  Vitals and nursing note reviewed.   Constitutional:       Appearance: She is well-developed.   HENT:      Head: Normocephalic.   Eyes:      Conjunctiva/sclera: Conjunctivae normal.      Pupils: Pupils are equal, round, and reactive to light.   Neck:      Vascular: No JVD.   Cardiovascular:      Rate and Rhythm: Normal rate and regular rhythm.      Heart sounds: Normal heart sounds. No murmur heard.  No friction rub. No gallop.    Pulmonary:      Effort: No respiratory distress.      Breath sounds: No wheezing or rales.      Comments: Diminished breath and bilateral, clear .  Chest:      Chest wall: No tenderness.   Abdominal:      General: Bowel sounds are normal. There is no distension.      Palpations: Abdomen is soft.      Tenderness: There is no abdominal tenderness. There is no guarding or rebound.   Musculoskeletal:         General: No tenderness or deformity. Normal range of motion.      Cervical back: Neck supple.   Skin:     General: Skin is warm and dry.      Findings: No rash.      Comments: Status post left breast mastectomy, no sign cellulitis, wound looks clean dry and intact.  Drainage tubes left breast noted with tent blood inside the tube.   Neurological:      General: No focal deficit present.      Mental Status: She is alert.      Cranial Nerves: No cranial nerve deficit.      Motor: Weakness " "present. No abnormal muscle tone.      Coordination: Coordination abnormal.      Gait: Gait abnormal.      Deep Tendon Reflexes: Reflexes normal.   Psychiatric:         Behavior: Behavior normal.       Results Reviewed:    Lab Results (last 24 hours)     Procedure Component Value Units Date/Time    Procalcitonin [[id number]]  (Normal) Collected: 01/08/22 1045    Specimen: Blood from Arm, Right Updated: 01/08/22 1123     Procalcitonin 0.15 ng/mL     Narrative:      As a Marker for Sepsis (Non-Neonates):     1. <0.5 ng/mL represents a low risk of severe sepsis and/or septic shock.  2. >2 ng/mL represents a high risk of severe sepsis and/or septic shock.    As a Marker for Lower Respiratory Tract Infections that require antibiotic therapy:  PCT on Admission     Antibiotic Therapy             6-12 Hrs later  >0.5                          Strongly Recommended            >0.25 - <0.5             Recommended  0.1 - 0.25                  Discouraged                       Remeasure/reassess PCT  <0.1                         Strongly Discouraged         Remeasure/reassess PCT      As 28 day mortality risk marker: \"Change in Procalcitonin Result\" (>80% or <=80%) if Day 0 (or Day 1) and Day 4 values are available. Refer to http://www.[facility]pct-calculator.[facility]/    Change in PCT <=80 %   A decrease of PCT levels below or equal to 80% defines a positive change in PCT test result representing a higher risk for 28-day all-cause mortality of patients diagnosed with severe sepsis or septic shock.    Change in PCT >80 %   A decrease of PCT levels of more than 80% defines a negative change in PCT result representing a lower risk for 28-day all-cause mortality of patients diagnosed with severe sepsis or septic shock.                C-reactive Protein [[id number]]  (Abnormal) Collected: 01/08/22 1045    Specimen: Blood from Arm, Right Updated: 01/08/22 1120     C-Reactive Protein 1.23 mg/dL     Manual Differential [[id number]]  (Abnormal) " Collected: 01/08/22 1045    Specimen: Blood from Arm, Right Updated: 01/08/22 1119     Neutrophil % 84.7 %      Lymphocyte % 7.1 %      Monocyte % 7.1 %      Atypical Lymphocyte % 1.0 %      Neutrophils Absolute 5.89 10*3/mm3      Lymphocytes Absolute 0.56 10*3/mm3      Monocytes Absolute 0.49 10*3/mm3      Elliptocytes Slight/1+     Microcytes Slight/1+     Ovalocytes Slight/1+     Poikilocytes Slight/1+     Polychromasia Slight/1+     Target Cells Slight/1+     Stomatocytes Slight/1+     WBC Morphology Normal     Platelet Morphology Normal    CBC & Differential [[id number]]  (Abnormal) Collected: 01/08/22 1045    Specimen: Blood from Arm, Right Updated: 01/08/22 1119    Narrative:      The following orders were created for panel order CBC & Differential.  Procedure                               Abnormality         Status                     ---------                               -----------         ------                     CBC Auto Differential[[id number]]        Abnormal            Final result                 Please view results for these tests on the individual orders.    CBC Auto Differential [[id number]]  (Abnormal) Collected: 01/08/22 1045    Specimen: Blood from Arm, Right Updated: 01/08/22 1119     WBC 6.95 10*3/mm3      RBC 5.76 10*6/mm3      Hemoglobin 13.4 g/dL      Hematocrit 42.9 %      MCV 74.5 fL      MCH 23.3 pg      MCHC 31.2 g/dL      RDW 16.4 %      RDW-SD 43.0 fl      MPV 9.5 fL      Platelets 298 10*3/mm3     Comprehensive Metabolic Panel [[id number]]  (Abnormal) Collected: 01/08/22 1045    Specimen: Blood from Arm, Right Updated: 01/08/22 1118     Glucose 140 mg/dL      BUN 12 mg/dL      Creatinine 0.61 mg/dL      Sodium 129 mmol/L      Potassium 2.9 mmol/L      Chloride 89 mmol/L      CO2 29.0 mmol/L      Calcium 9.2 mg/dL      Total Protein 7.1 g/dL      Albumin 3.80 g/dL      ALT (SGPT) 13 U/L      AST (SGOT) 36 U/L      Alkaline Phosphatase 57 U/L      Total Bilirubin 0.8 mg/dL      eGFR  Non African Amer 95 mL/min/1.73      Globulin 3.3 gm/dL      A/G Ratio 1.2 g/dL      BUN/Creatinine Ratio 19.7     Anion Gap 11.0 mmol/L     Narrative:      GFR Normal >60  Chronic Kidney Disease <60  Kidney Failure <15      Troponin [[id number]]  (Normal) Collected: 01/08/22 1045    Specimen: Blood from Arm, Right Updated: 01/08/22 1116     Troponin T <0.010 ng/mL     Narrative:      Troponin T Reference Range:  <= 0.03 ng/mL-   Negative for AMI  >0.03 ng/mL-     Abnormal for myocardial necrosis.  Clinicians would have to utilize clinical acumen, EKG, Troponin and serial changes to determine if it is an Acute Myocardial Infarction or myocardial injury due to an underlying chronic condition.       Results may be falsely decreased if patient taking Biotin.      Lactic Acid, Plasma [[id number]]  (Normal) Collected: 01/08/22 1045    Specimen: Blood from Arm, Right Updated: 01/08/22 1116     Lactate 1.6 mmol/L     BNP [[id number]]  (Abnormal) Collected: 01/08/22 1045    Specimen: Blood from Arm, Right Updated: 01/08/22 1115     proBNP 2,331.0 pg/mL     Narrative:      Among patients with dyspnea, NT-proBNP is highly sensitive for the detection of acute congestive heart failure. In addition NT-proBNP of <300 pg/ml effectively rules out acute congestive heart failure with 99% negative predictive value.    Results may be falsely decreased if patient taking Biotin.      Blood Culture - Blood, Arm, Right [[id number]] Collected: 01/08/22 1040    Specimen: Blood from Arm, Right Updated: 01/08/22 1114    Blood Culture - Blood, Arm, Right [[id number]] Collected: 01/08/22 1045    Specimen: Blood from Arm, Right Updated: 01/08/22 1114    Urine Drug Screen - Urine, Clean Catch [[id number]]  (Abnormal) Collected: 01/08/22 1046    Specimen: Urine, Clean Catch Updated: 01/08/22 1107     THC, Screen, Urine Negative     Phencyclidine (PCP), Urine Negative     Cocaine Screen, Urine Negative     Methamphetamine, Ur Negative     Opiate Screen  Negative     Amphetamine Screen, Urine Negative     Benzodiazepine Screen, Urine Negative     Tricyclic Antidepressants Screen Negative     Methadone Screen, Urine Negative     Barbiturates Screen, Urine Negative     Oxycodone Screen, Urine Positive     Propoxyphene Screen Negative     Buprenorphine, Screen, Urine Negative    Narrative:      Cutoff For Drugs Screened:    Amphetamines               500 ng/ml  Barbiturates               200 ng/ml  Benzodiazepines            150 ng/ml  Cocaine                    150 ng/ml  Methadone                  200 ng/ml  Opiates                    100 ng/ml  Phencyclidine               25 ng/ml  THC                            50 ng/ml  Methamphetamine            500 ng/ml  Tricyclic Antidepressants  300 ng/ml  Oxycodone                  100 ng/ml  Propoxyphene               300 ng/ml  Buprenorphine               10 ng/ml    The normal value for all drugs tested is negative. This report includes unconfirmed screening results, with the cutoff values listed, to be used for medical treatment purposes only.  Unconfirmed results must not be used for non-medical purposes such as employment or legal testing.  Clinical consideration should be applied to any drug of abuse test, particularly when unconfirmed results are used.      Protime-INR [[id number]]  (Abnormal) Collected: 01/08/22 1045    Specimen: Blood from Arm, Right Updated: 01/08/22 1106     Protime 13.8 Seconds      INR 1.10    aPTT [[id number]]  (Normal) Collected: 01/08/22 1045    Specimen: Blood from Arm, Right Updated: 01/08/22 1106     PTT 33.2 seconds     Urinalysis With Culture If Indicated - Urine, Clean Catch [[id number]]  (Abnormal) Collected: 01/08/22 1046    Specimen: Urine, Clean Catch Updated: 01/08/22 1103     Color, UA Yellow     Appearance, UA Clear     pH, UA 7.5     Specific Gravity, UA 1.009     Glucose, UA Negative     Ketones, UA Negative     Bilirubin, UA Negative     Blood, UA Negative     Protein, UA 30  mg/dL (1+)     Leuk Esterase, UA Negative     Nitrite, UA Negative     Urobilinogen, UA 0.2 E.U./dL    Urinalysis, Microscopic Only - Urine, Clean Catch [[id number]]  (Abnormal) Collected: 01/08/22 1046    Specimen: Urine, Clean Catch Updated: 01/08/22 1103     RBC, UA 0-2 /HPF      WBC, UA 0-2 /HPF      Bacteria, UA None Seen /HPF      Squamous Epithelial Cells, UA 0-2 /HPF      Hyaline Casts, UA 0-2 /LPF      Methodology Automated Microscopy           Radiology Data:    Imaging Results (Last 24 Hours)     Procedure Component Value Units Date/Time    CT Head Without Contrast [[id number]] Collected: 01/08/22 1202     Updated: 01/08/22 1209    Narrative:      EXAM:   CT OF THE HEAD WITHOUT IV CONTRAST   CT CERVICAL SPINE WITHOUT IV CONTRAST 1/8/2022.     COMPARISON: None      INDICATION: Altered mental status      PROCEDURE: Non contrast enhanced head CT and cervical spine CT were  performed. The head images were formatted in the axial plane at 5 mm  thick intervals. The cervical spine images were formatted in the axial,  coronal and sagittal planes.     Radiation dose equals DLP 1004 mGy-cm.  Automated exposure control dose  reduction technique was implemented.     FINDINGS:   HEAD: Generalized volume loss. Chronic microvessel changes.. There is no  evidence of mass-effect or midline shift. The gray-white differentiation  is preserved.  There is no evidence of intracranial contusion,  hemorrhage, or skull fracture.  The visualized portions of the paranasal  sinuses and mastoid air cells are unremarkable.     CERVICAL SPINE: The odontoid process is well approximated with the  anterior body of C1 and well aligned between the lateral masses of C1.  Reversal of the normal lordosis of the cervical spine with grade 1  listhesis of C2 on C3 C3 and C4. There is no acute compression  deformity. No dislocation. Multilevel degenerative changes, with disc  disease and facet arthropathy. Visualized lung apices demonstrate  no  focal consolidation, pleural effusion or pneumothorax. There is no  paraspinal hematoma.                Impression:      CT head. No acute intracranial abnormalities.     CT cervical spine. No acute osseous posttraumatic findings.  This report was finalized on 01/08/2022 12:06 by Dr. Kandace Fabian MD.    CT Cervical Spine Without Contrast [[id number]] Collected: 01/08/22 1202     Updated: 01/08/22 1209    Narrative:      EXAM:   CT OF THE HEAD WITHOUT IV CONTRAST   CT CERVICAL SPINE WITHOUT IV CONTRAST 1/8/2022.     COMPARISON: None      INDICATION: Altered mental status      PROCEDURE: Non contrast enhanced head CT and cervical spine CT were  performed. The head images were formatted in the axial plane at 5 mm  thick intervals. The cervical spine images were formatted in the axial,  coronal and sagittal planes.     Radiation dose equals DLP 1004 mGy-cm.  Automated exposure control dose  reduction technique was implemented.     FINDINGS:   HEAD: Generalized volume loss. Chronic microvessel changes.. There is no  evidence of mass-effect or midline shift. The gray-white differentiation  is preserved.  There is no evidence of intracranial contusion,  hemorrhage, or skull fracture.  The visualized portions of the paranasal  sinuses and mastoid air cells are unremarkable.     CERVICAL SPINE: The odontoid process is well approximated with the  anterior body of C1 and well aligned between the lateral masses of C1.  Reversal of the normal lordosis of the cervical spine with grade 1  listhesis of C2 on C3 C3 and C4. There is no acute compression  deformity. No dislocation. Multilevel degenerative changes, with disc  disease and facet arthropathy. Visualized lung apices demonstrate no  focal consolidation, pleural effusion or pneumothorax. There is no  paraspinal hematoma.                Impression:      CT head. No acute intracranial abnormalities.     CT cervical spine. No acute osseous posttraumatic findings.  This  report was finalized on 01/08/2022 12:06 by Dr. Kandace Fabian MD.    XR Chest 1 View [[id number]] Collected: 01/08/22 1142     Updated: 01/08/22 1146    Narrative:      Exam:   XR CHEST 1 VW-       Date:  1/8/2022      History:  Female, age  76 years;ams; R41.82-Altered mental status,  unspecified; S09.90XA-Unspecified injury of head, initial encounter;  W19.XXXA-Unspecified fall, initial encounter     COMPARISON:  Chest x-ray dated 11/10/2021     Findings :     Mild cardiomegaly. Chronic coarsening of the interstitium. There is a  catheter projected over the left hemithorax. Lungs are without focal  infiltrate, mass or effusions.  The bones show no acute pathology.         Impression:      Impression:     Mild cardiomegaly, with chronic interstitial lung changes.     This report was finalized on 01/08/2022 11:43 by Dr. Kandace Fabian MD.          I have personally reviewed and interpreted the radiology studies and ECG obtained at time of admission.     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    Altered mental status.  CT head. No acute intracranial abnormalities.  CT cervical spine. No acute osseous posttraumatic findings.  Chest x-ray-Mild cardiomegaly, with chronic interstitial lung changes.    Nausea/vomiting. Pepcid. Zofran as needed.    Hyponatremia.  LR 75 cc an hour.  Serial BMP.    Hypokalemia.  P.o. potassium.  Magnesium level pending.  Patient got to get magnesium in ER.    History of neoplasm of the left breast.  Status post mastectomy of the left breast on November 30, 2021.  Status post hematoma evacuation December 21, 2021, AILYN tube is in place left breast.  UDS positive for oxycodone.    Hypertension.  Hold Maxide and Diovan for now due to hypotension.    Constipation.   Continue Colace.     Hypothyroidism.  Continue Synthroid.    Nausea/reflux.  Pepcid.  Zofran as needed.  Psych treatment psychiatry    Chronic pain.  Hold oxycodone for now.  Hold Percocet for now.    Code Status: DNR.  DNI.  Patient admitted medical decisions for self, her daughter Kassidy Lind will make it for her.     I discussed the patient's findings and my recommendations with: Patient and daughter.    Estimated length of stay: 2 to 4 days.    Electronically signed by Thang Aceves MD, 01/08/22, 1:10 PM CST.              Electronically signed by Thang Aceves MD at 01/08/22 1351       Emergency Department Notes    No notes of this type exist for this encounter.         Vital Signs (last 3 days)     Date/Time Temp Temp src Pulse Resp BP Patient Position SpO2    01/10/22 1118 97.8 (36.6) Oral 98 16 107/82 Sitting 94    01/10/22 0759 98.6 (37) Oral 102 18 118/92 Lying 94    01/10/22 0530 98.2 (36.8) Oral 91 18 122/84 Lying 94    01/09/22 1954 98.2 (36.8) Oral 89 18 115/64 Lying 95    01/09/22 1616 97.6 (36.4) Oral 61 18 124/75 Sitting 92    01/09/22 1145 98.6 (37) Oral 85 20 125/63 Lying 94    01/09/22 0749 98 (36.7) Oral 79 20 122/90 Lying 92    01/09/22 0409 97.7 (36.5) Oral 111 22 143/73 Lying 92    01/08/22 2309 97.8 (36.6) Oral 100 20 135/78 Lying 93    01/08/22 1900 97.6 (36.4) Oral 104 22 116/71 Lying 94    01/08/22 1744 97.8 (36.6) Oral 65 22 131/75 Lying 92    01/08/22 1716 -- -- -- -- 140/85 -- --    01/08/22 1715 -- -- 98 -- -- -- 96    01/08/22 1701 -- -- 64 18 126/71 -- 94    01/08/22 1500 -- -- 86 16 130/80 Sitting 92    01/08/22 1300 -- -- 83 18 125/88 Sitting 91    01/08/22 11:15:32 97.8 (36.6) Oral -- -- -- -- --    01/08/22 1100 -- -- 78 18 122/60 Lying 92    01/08/22 1001 --  -- 88 20 110/45 -- 91    Comments:   Temp: unable to obtain x 2 attempts at 01/08/22 1001           Facility-Administered Medications as of 1/10/2022   Medication Dose Route Frequency Provider Last Rate Last Admin   •  acetaminophen (TYLENOL) tablet 975 mg  975 mg Oral Q8H Thang Aceves MD   975 mg at 01/10/22 0604   • docusate sodium (COLACE) capsule 100 mg  100 mg Oral BID Thang Aceves MD   100 mg at 01/10/22 0822   • famotidine (PEPCID) tablet 20 mg  20 mg Oral BID AC Thnag Aceves MD   20 mg at 01/10/22 0822   • lactated ringers infusion  50 mL/hr Intravenous Continuous Thang Aceves MD 50 mL/hr at 01/09/22 2135 50 mL/hr at 01/09/22 2135   • levothyroxine (SYNTHROID, LEVOTHROID) tablet 75 mcg  75 mcg Oral Q AM Thang Aceves MD   75 mcg at 01/10/22 0604   • [COMPLETED] magnesium sulfate 2g/50 mL (PREMIX) infusion  2 g Intravenous Once Jose Green MD   Stopped at 01/08/22 1615   • oxyCODONE-acetaminophen (PERCOCET) 5-325 MG per tablet 1 tablet  1 tablet Oral Q4H PRN Thang Aceves MD   1 tablet at 01/09/22 1324   • potassium chloride (KLOR-CON) packet 40 mEq  40 mEq Oral BID Thang Aceves MD   40 mEq at 01/10/22 0822   • sodium chloride 0.9 % flush 10 mL  10 mL Intravenous Q12H Thang Aceves MD   10 mL at 01/10/22 0823   • sodium chloride 0.9 % flush 10 mL  10 mL Intravenous PRN Thang Aceves MD       • [COMPLETED] ziprasidone (GEODON) injection 10 mg  10 mg Intramuscular Once Edward Patricia MD   10 mg at 01/09/22 0408     Lab Results (last 72 hours)     Procedure Component Value Units Date/Time    Basic Metabolic Panel [[id number]]  (Abnormal) Collected: 01/10/22 1128    Specimen: Blood Updated: 01/10/22 1216     Glucose 109 mg/dL      BUN 8 mg/dL      Creatinine 0.64 mg/dL      Sodium 132 mmol/L      Potassium 5.3 mmol/L      Chloride 96 mmol/L      CO2 28.0 mmol/L      Calcium 9.0 mg/dL      eGFR Non African Amer 90 mL/min/1.73      BUN/Creatinine Ratio 12.5     Anion Gap 8.0 mmol/L     Narrative:      GFR Normal >60  Chronic Kidney Disease <60  Kidney Failure <15      CBC (No Diff) [[id number]]  (Abnormal) Collected: 01/10/22 1128    Specimen: Blood Updated: 01/10/22 1145     WBC  7.24 10*3/mm3      RBC 5.56 10*6/mm3      Hemoglobin 13.5 g/dL      Hematocrit 42.9 %      MCV 77.2 fL      MCH 24.3 pg      MCHC 31.5 g/dL      RDW 16.4 %      RDW-SD 45.1 fl      MPV 9.6 fL      Platelets 300 10*3/mm3     Blood Culture - Blood, Arm, Right [[id number]]  (Normal) Collected: 01/08/22 1045    Specimen: Blood from Arm, Right Updated: 01/10/22 1115     Blood Culture No growth at 2 days    Blood Culture - Blood, Arm, Right [[id number]]  (Normal) Collected: 01/08/22 1040    Specimen: Blood from Arm, Right Updated: 01/10/22 1115     Blood Culture No growth at 2 days    Basic Metabolic Panel [[id number]]  (Abnormal) Collected: 01/09/22 1134    Specimen: Blood Updated: 01/09/22 1157     Glucose 118 mg/dL      BUN 10 mg/dL      Creatinine 0.52 mg/dL      Sodium 135 mmol/L      Potassium 4.5 mmol/L      Comment: Slight hemolysis detected by analyzer. Results may be affected.        Chloride 98 mmol/L      CO2 28.0 mmol/L      Calcium 8.5 mg/dL      eGFR Non African Amer 114 mL/min/1.73      BUN/Creatinine Ratio 19.2     Anion Gap 9.0 mmol/L     Narrative:      GFR Normal >60  Chronic Kidney Disease <60  Kidney Failure <15      Basic Metabolic Panel [[id number]]  (Abnormal) Collected: 01/09/22 0539    Specimen: Blood Updated: 01/09/22 0648     Glucose 92 mg/dL      BUN 11 mg/dL      Creatinine 0.57 mg/dL      Sodium 127 mmol/L      Potassium 3.8 mmol/L      Chloride 92 mmol/L      CO2 28.0 mmol/L      Calcium 8.9 mg/dL      eGFR Non African Amer 103 mL/min/1.73      BUN/Creatinine Ratio 19.3     Anion Gap 7.0 mmol/L     Narrative:      GFR Normal >60  Chronic Kidney Disease <60  Kidney Failure <15      Lipid Panel [[id number]]  (Abnormal) Collected: 01/09/22 0539    Specimen: Blood Updated: 01/09/22 0648     Total Cholesterol 150 mg/dL      Triglycerides 69 mg/dL      HDL Cholesterol 70 mg/dL      LDL Cholesterol  66 mg/dL      VLDL Cholesterol 14 mg/dL      LDL/HDL Ratio 0.95    Narrative:      Cholesterol  Reference Ranges  (U.S. Department of Health and Human Services ATP III Classifications)    Desirable          <200 mg/dL  Borderline High    200-239 mg/dL  High Risk          >240 mg/dL      Triglyceride Reference Ranges  (U.S. Department of Health and Human Services ATP III Classifications)    Normal           <150 mg/dL  Borderline High  150-199 mg/dL  High             200-499 mg/dL  Very High        >500 mg/dL    HDL Reference Ranges  (U.S. Department of Health and Human Services ATP III Classifcations)    Low     <40 mg/dl (major risk factor for CHD)  High    >60 mg/dl ('negative' risk factor for CHD)        LDL Reference Ranges  (U.S. Department of Health and Human Services ATP III Classifcations)    Optimal          <100 mg/dL  Near Optimal     100-129 mg/dL  Borderline High  130-159 mg/dL  High             160-189 mg/dL  Very High        >189 mg/dL    TSH [[id number]]  (Normal) Collected: 01/09/22 0539    Specimen: Blood Updated: 01/09/22 0648     TSH 1.180 uIU/mL     Hemoglobin A1c [[id number]]  (Abnormal) Collected: 01/09/22 0539    Specimen: Blood Updated: 01/09/22 0631     Hemoglobin A1C 5.70 %     Narrative:      Hemoglobin A1C Ranges:    Increased Risk for Diabetes  5.7% to 6.4%  Diabetes                     >= 6.5%  Diabetic Goal                < 7.0%    CBC Auto Differential [[id number]]  (Abnormal) Collected: 01/09/22 0539    Specimen: Blood Updated: 01/09/22 0620     WBC 6.63 10*3/mm3      RBC 5.75 10*6/mm3      Hemoglobin 13.2 g/dL      Hematocrit 43.5 %      MCV 75.7 fL      MCH 23.0 pg      MCHC 30.3 g/dL      RDW 16.6 %      RDW-SD 43.6 fl      MPV 9.4 fL      Platelets 284 10*3/mm3      Neutrophil % 69.1 %      Lymphocyte % 16.3 %      Monocyte % 10.6 %      Eosinophil % 3.0 %      Basophil % 0.5 %      Immature Grans % 0.5 %      Neutrophils, Absolute 4.59 10*3/mm3      Lymphocytes, Absolute 1.08 10*3/mm3      Monocytes, Absolute 0.70 10*3/mm3      Eosinophils, Absolute 0.20 10*3/mm3       Basophils, Absolute 0.03 10*3/mm3      Immature Grans, Absolute 0.03 10*3/mm3      nRBC 0.0 /100 WBC     Basic Metabolic Panel [[id number]]  (Abnormal) Collected: 01/09/22 0023    Specimen: Blood Updated: 01/09/22 0058     Glucose 77 mg/dL      BUN 12 mg/dL      Creatinine 0.60 mg/dL      Sodium 126 mmol/L      Potassium 3.8 mmol/L      Chloride 91 mmol/L      CO2 24.0 mmol/L      Calcium 8.7 mg/dL      eGFR Non African Amer 97 mL/min/1.73      BUN/Creatinine Ratio 20.0     Anion Gap 11.0 mmol/L     Narrative:      GFR Normal >60  Chronic Kidney Disease <60  Kidney Failure <15      Basic Metabolic Panel [[id number]]  (Abnormal) Collected: 01/08/22 2017    Specimen: Blood Updated: 01/08/22 2110     Glucose 149 mg/dL      BUN 11 mg/dL      Creatinine 0.71 mg/dL      Sodium 129 mmol/L      Potassium 3.1 mmol/L      Comment: Slight hemolysis detected by analyzer. Results may be affected.        Chloride 89 mmol/L      CO2 27.0 mmol/L      Calcium 9.1 mg/dL      eGFR Non African Amer 80 mL/min/1.73      BUN/Creatinine Ratio 15.5     Anion Gap 13.0 mmol/L     Narrative:      GFR Normal >60  Chronic Kidney Disease <60  Kidney Failure <15      COVID PRE-OP / PRE-PROCEDURE SCREENING ORDER (NO ISOLATION) - Swab, Nasal Cavity [[id number]]  (Normal) Collected: 01/08/22 1618    Specimen: Swab from Nasal Cavity Updated: 01/08/22 1725    Narrative:      The following orders were created for panel order COVID PRE-OP / PRE-PROCEDURE SCREENING ORDER (NO ISOLATION) - Swab, Nasal Cavity.  Procedure                               Abnormality         Status                     ---------                               -----------         ------                     COVID-19,[facility] IN-[facility]RI[facility]...[[id number]]  Normal              Final result                 Please view results for these tests on the individual orders.    COVID-19,[facility] IN-HOUSE,Nasal Swab No Transport Media 3-4 HR TAT - Swab, Nasal Cavity [[id number]]  (Normal) Collected: 01/08/22  "1618    Specimen: Swab from Nasal Cavity Updated: 01/08/22 1725     COVID19 Not Detected    Narrative:      Fact sheet for providers: https://www.fda.gov/media/673489/download     Fact sheet for patients: https://www.fda.gov/media/103252/download    Test performed by PCR.    Consider negative results in combination with clinical observations, patient history, and epidemiological information.    Magnesium [[id number]]  (Normal) Collected: 01/08/22 1045    Specimen: Blood from Arm, Right Updated: 01/08/22 1508     Magnesium 1.9 mg/dL     Procalcitonin [[id number]]  (Normal) Collected: 01/08/22 1045    Specimen: Blood from Arm, Right Updated: 01/08/22 1123     Procalcitonin 0.15 ng/mL     Narrative:      As a Marker for Sepsis (Non-Neonates):     1. <0.5 ng/mL represents a low risk of severe sepsis and/or septic shock.  2. >2 ng/mL represents a high risk of severe sepsis and/or septic shock.    As a Marker for Lower Respiratory Tract Infections that require antibiotic therapy:  PCT on Admission     Antibiotic Therapy             6-12 Hrs later  >0.5                          Strongly Recommended            >0.25 - <0.5             Recommended  0.1 - 0.25                  Discouraged                       Remeasure/reassess PCT  <0.1                         Strongly Discouraged         Remeasure/reassess PCT      As 28 day mortality risk marker: \"Change in Procalcitonin Result\" (>80% or <=80%) if Day 0 (or Day 1) and Day 4 values are available. Refer to [email or web address]    Change in PCT <=80 %   A decrease of PCT levels below or equal to 80% defines a positive change in PCT test result representing a higher risk for 28-day all-cause mortality of patients diagnosed with severe sepsis or septic shock.    Change in PCT >80 %   A decrease of PCT levels of more than 80% defines a negative change in PCT result representing a lower risk for 28-day all-cause mortality of patients diagnosed with severe sepsis " or septic shock.                C-reactive Protein [[id number]]  (Abnormal) Collected: 01/08/22 1045    Specimen: Blood from Arm, Right Updated: 01/08/22 1120     C-Reactive Protein 1.23 mg/dL     Manual Differential [[id number]]  (Abnormal) Collected: 01/08/22 1045    Specimen: Blood from Arm, Right Updated: 01/08/22 1119     Neutrophil % 84.7 %      Lymphocyte % 7.1 %      Monocyte % 7.1 %      Atypical Lymphocyte % 1.0 %      Neutrophils Absolute 5.89 10*3/mm3      Lymphocytes Absolute 0.56 10*3/mm3      Monocytes Absolute 0.49 10*3/mm3      Elliptocytes Slight/1+     Microcytes Slight/1+     Ovalocytes Slight/1+     Poikilocytes Slight/1+     Polychromasia Slight/1+     Target Cells Slight/1+     Stomatocytes Slight/1+     WBC Morphology Normal     Platelet Morphology Normal    CBC & Differential [[id number]]  (Abnormal) Collected: 01/08/22 1045    Specimen: Blood from Arm, Right Updated: 01/08/22 1119    Narrative:      The following orders were created for panel order CBC & Differential.  Procedure                               Abnormality         Status                     ---------                               -----------         ------                     CBC Auto Differential[[id number]]        Abnormal            Final result                 Please view results for these tests on the individual orders.    CBC Auto Differential [[id number]]  (Abnormal) Collected: 01/08/22 1045    Specimen: Blood from Arm, Right Updated: 01/08/22 1119     WBC 6.95 10*3/mm3      RBC 5.76 10*6/mm3      Hemoglobin 13.4 g/dL      Hematocrit 42.9 %      MCV 74.5 fL      MCH 23.3 pg      MCHC 31.2 g/dL      RDW 16.4 %      RDW-SD 43.0 fl      MPV 9.5 fL      Platelets 298 10*3/mm3     Comprehensive Metabolic Panel [[id number]]  (Abnormal) Collected: 01/08/22 1045    Specimen: Blood from Arm, Right Updated: 01/08/22 1118     Glucose 140 mg/dL      BUN 12 mg/dL      Creatinine 0.61 mg/dL      Sodium 129 mmol/L      Potassium 2.9 mmol/L       Chloride 89 mmol/L      CO2 29.0 mmol/L      Calcium 9.2 mg/dL      Total Protein 7.1 g/dL      Albumin 3.80 g/dL      ALT (SGPT) 13 U/L      AST (SGOT) 36 U/L      Alkaline Phosphatase 57 U/L      Total Bilirubin 0.8 mg/dL      eGFR Non African Amer 95 mL/min/1.73      Globulin 3.3 gm/dL      A/G Ratio 1.2 g/dL      BUN/Creatinine Ratio 19.7     Anion Gap 11.0 mmol/L     Narrative:      GFR Normal >60  Chronic Kidney Disease <60  Kidney Failure <15      Troponin [[id number]]  (Normal) Collected: 01/08/22 1045    Specimen: Blood from Arm, Right Updated: 01/08/22 1116     Troponin T <0.010 ng/mL     Narrative:      Troponin T Reference Range:  <= 0.03 ng/mL-   Negative for AMI  >0.03 ng/mL-     Abnormal for myocardial necrosis.  Clinicians would have to utilize clinical acumen, EKG, Troponin and serial changes to determine if it is an Acute Myocardial Infarction or myocardial injury due to an underlying chronic condition.       Results may be falsely decreased if patient taking Biotin.      Lactic Acid, Plasma [[id number]]  (Normal) Collected: 01/08/22 1045    Specimen: Blood from Arm, Right Updated: 01/08/22 1116     Lactate 1.6 mmol/L     BNP [[id number]]  (Abnormal) Collected: 01/08/22 1045    Specimen: Blood from Arm, Right Updated: 01/08/22 1115     proBNP 2,331.0 pg/mL     Narrative:      Among patients with dyspnea, NT-proBNP is highly sensitive for the detection of acute congestive heart failure. In addition NT-proBNP of <300 pg/ml effectively rules out acute congestive heart failure with 99% negative predictive value.    Results may be falsely decreased if patient taking Biotin.      Urine Drug Screen - Urine, Clean Catch [[id number]]  (Abnormal) Collected: 01/08/22 1046    Specimen: Urine, Clean Catch Updated: 01/08/22 1107     THC, Screen, Urine Negative     Phencyclidine (PCP), Urine Negative     Cocaine Screen, Urine Negative     Methamphetamine, Ur Negative     Opiate Screen Negative     Amphetamine  Screen, Urine Negative     Benzodiazepine Screen, Urine Negative     Tricyclic Antidepressants Screen Negative     Methadone Screen, Urine Negative     Barbiturates Screen, Urine Negative     Oxycodone Screen, Urine Positive     Propoxyphene Screen Negative     Buprenorphine, Screen, Urine Negative    Narrative:      Cutoff For Drugs Screened:    Amphetamines               500 ng/ml  Barbiturates               200 ng/ml  Benzodiazepines            150 ng/ml  Cocaine                    150 ng/ml  Methadone                  200 ng/ml  Opiates                    100 ng/ml  Phencyclidine               25 ng/ml  THC                            50 ng/ml  Methamphetamine            500 ng/ml  Tricyclic Antidepressants  300 ng/ml  Oxycodone                  100 ng/ml  Propoxyphene               300 ng/ml  Buprenorphine               10 ng/ml    The normal value for all drugs tested is negative. This report includes unconfirmed screening results, with the cutoff values listed, to be used for medical treatment purposes only.  Unconfirmed results must not be used for non-medical purposes such as employment or legal testing.  Clinical consideration should be applied to any drug of abuse test, particularly when unconfirmed results are used.      Protime-INR [[id number]]  (Abnormal) Collected: 01/08/22 1045    Specimen: Blood from Arm, Right Updated: 01/08/22 1106     Protime 13.8 Seconds      INR 1.10    aPTT [[id number]]  (Normal) Collected: 01/08/22 1045    Specimen: Blood from Arm, Right Updated: 01/08/22 1106     PTT 33.2 seconds     Urinalysis With Culture If Indicated - Urine, Clean Catch [[id number]]  (Abnormal) Collected: 01/08/22 1046    Specimen: Urine, Clean Catch Updated: 01/08/22 1103     Color, UA Yellow     Appearance, UA Clear     pH, UA 7.5     Specific Gravity, UA 1.009     Glucose, UA Negative     Ketones, UA Negative     Bilirubin, UA Negative     Blood, UA Negative     Protein, UA 30 mg/dL (1+)     Leuk  Esterase, UA Negative     Nitrite, UA Negative     Urobilinogen, UA 0.2 E.U./dL    Urinalysis, Microscopic Only - Urine, Clean Catch [[id number]]  (Abnormal) Collected: 01/08/22 1046    Specimen: Urine, Clean Catch Updated: 01/08/22 1103     RBC, UA 0-2 /HPF      WBC, UA 0-2 /HPF      Bacteria, UA None Seen /HPF      Squamous Epithelial Cells, UA 0-2 /HPF      Hyaline Casts, UA 0-2 /LPF      Methodology Automated Microscopy          Imaging Results (Last 72 Hours)     Procedure Component Value Units Date/Time    CT Head Without Contrast [[id number]] Collected: 01/08/22 1202     Updated: 01/08/22 1209    Narrative:      EXAM:   CT OF THE HEAD WITHOUT IV CONTRAST   CT CERVICAL SPINE WITHOUT IV CONTRAST 1/8/2022.     COMPARISON: None      INDICATION: Altered mental status      PROCEDURE: Non contrast enhanced head CT and cervical spine CT were  performed. The head images were formatted in the axial plane at 5 mm  thick intervals. The cervical spine images were formatted in the axial,  coronal and sagittal planes.     Radiation dose equals DLP 1004 mGy-cm.  Automated exposure control dose  reduction technique was implemented.     FINDINGS:   HEAD: Generalized volume loss. Chronic microvessel changes.. There is no  evidence of mass-effect or midline shift. The gray-white differentiation  is preserved.  There is no evidence of intracranial contusion,  hemorrhage, or skull fracture.  The visualized portions of the paranasal  sinuses and mastoid air cells are unremarkable.     CERVICAL SPINE: The odontoid process is well approximated with the  anterior body of C1 and well aligned between the lateral masses of C1.  Reversal of the normal lordosis of the cervical spine with grade 1  listhesis of C2 on C3 C3 and C4. There is no acute compression  deformity. No dislocation. Multilevel degenerative changes, with disc  disease and facet arthropathy. Visualized lung apices demonstrate no  focal consolidation, pleural effusion or  pneumothorax. There is no  paraspinal hematoma.                Impression:      CT head. No acute intracranial abnormalities.     CT cervical spine. No acute osseous posttraumatic findings.  This report was finalized on 01/08/2022 12:06 by Dr. Kandace Fabian MD.    CT Cervical Spine Without Contrast [[id number]] Collected: 01/08/22 1202     Updated: 01/08/22 1209    Narrative:      EXAM:   CT OF THE HEAD WITHOUT IV CONTRAST   CT CERVICAL SPINE WITHOUT IV CONTRAST 1/8/2022.     COMPARISON: None      INDICATION: Altered mental status      PROCEDURE: Non contrast enhanced head CT and cervical spine CT were  performed. The head images were formatted in the axial plane at 5 mm  thick intervals. The cervical spine images were formatted in the axial,  coronal and sagittal planes.     Radiation dose equals DLP 1004 mGy-cm.  Automated exposure control dose  reduction technique was implemented.     FINDINGS:   HEAD: Generalized volume loss. Chronic microvessel changes.. There is no  evidence of mass-effect or midline shift. The gray-white differentiation  is preserved.  There is no evidence of intracranial contusion,  hemorrhage, or skull fracture.  The visualized portions of the paranasal  sinuses and mastoid air cells are unremarkable.     CERVICAL SPINE: The odontoid process is well approximated with the  anterior body of C1 and well aligned between the lateral masses of C1.  Reversal of the normal lordosis of the cervical spine with grade 1  listhesis of C2 on C3 C3 and C4. There is no acute compression  deformity. No dislocation. Multilevel degenerative changes, with disc  disease and facet arthropathy. Visualized lung apices demonstrate no  focal consolidation, pleural effusion or pneumothorax. There is no  paraspinal hematoma.                Impression:      CT head. No acute intracranial abnormalities.     CT cervical spine. No acute osseous posttraumatic findings.  This report was finalized on 01/08/2022 12:06 by   Kandace Fabian MD.    XR Chest 1 View [[id number]] Collected: 22 1142     Updated: 22 1146    Narrative:      Exam:   XR CHEST 1 VW-       Date:  2022      History:  Female, age  76 years;ams; R41.82-Altered mental status,  unspecified; S09.90XA-Unspecified injury of head, initial encounter;  W19.XXXA-Unspecified fall, initial encounter     COMPARISON:  Chest x-ray dated 11/10/2021     Findings :     Mild cardiomegaly. Chronic coarsening of the interstitium. There is a  catheter projected over the left hemithorax. Lungs are without focal  infiltrate, mass or effusions.  The bones show no acute pathology.         Impression:      Impression:     Mild cardiomegaly, with chronic interstitial lung changes.     This report was finalized on 2022 11:43 by Dr. Kandace Fabian MD.        ECG/EMG Results (last 72 hours)     Procedure Component Value Units Date/Time    ECG 12 Lead [[id number]] Collected: 22 1037     Updated: 22 0753     QT Interval 424 ms      QTC Interval 515 ms     Narrative:      Test Reason : ams  Blood Pressure :   */*   mmHG  Vent. Rate :  89 BPM     Atrial Rate : 141 BPM     P-R Int :   * ms          QRS Dur : 158 ms      QT Int : 424 ms       P-R-T Axes :   * -77 204 degrees     QTc Int : 515 ms    Sinus rhythm with frequent Premature atrial complexes  Right bundle branch block  Left anterior fascicular block  ** Bifascicular block **  T wave abnormality, consider lateral ischemia  Abnormal ECG  When compared with ECG of 10-NOV-2021 14:07,  No significant change was found                      Referred By: ALEISHA           Confirmed By: Paul Campoverde MD          Orders (last 72 hrs)      Start     Ordered    01/10/22 1145  PT Plan of Care Cert / Re-Cert  Once        Comments: Physical Therapy Plan of Care  Initial Certification  Certification Period: 1/10/2022 - 4/10/2022    Patient Name: Alissa Dominguez  : 1945    (E87.1) Hyponatremia  (primary encounter  diagnosis)  (S09.90XA) Closed head injury, initial encounter  (W19.XXXA) Fall, initial encounter  (R41.82) Altered mental status, unspecified altered mental status type  (R41.0) Delirium  (E87.6) Hypokalemia  (R13.10) Dysphagia, unspecified type  (Z74.09) Impaired mobility                  Assessment  PT Assessment  Rehab Potential (PT): good, to achieve stated therapy goals  Criteria for Skilled Interventions Met (PT): yes, meets criteria, skilled treatment is necessary         PT Rehab Goals     Row Name 01/10/22 0800             Bed Mobility Goal 1 (PT)    Activity/Assistive Device (Bed Mobility Goal 1, PT) bed mobility   activities, all  -MS      [place] Level/Cues Needed (Bed Mobility Goal 1, PT) independent  -MS        Time Frame (Bed Mobility Goal 1, PT) long term goal (LTG); by discharge    -MS      Progress/Outcomes (Bed Mobility Goal 1, PT) goal ongoing  -MS              Transfer Goal 1 (PT)      Activity/Assistive Device (Transfer Goal 1, PT)   sit-to-stand/stand-to-sit; bed-to-chair/chair-to-bed; walker, rolling  -MS        [place] Level/Cues Needed (Transfer Goal 1, PT) modified   independence  -MS      Time Frame (Transfer Goal 1, PT) long term goal (LTG); by discharge  -MS        Progress/Outcome (Transfer Goal 1, PT) goal ongoing  -MS              Gait Training Goal 1 (PT)      Activity/Assistive Device (Gait Training Goal 1, PT) gait (walking   locomotion); assistive device use; walker, rolling  -MS      [place] Level (Gait Training Goal 1, PT) supervision required  -MS        Distance (Gait Training Goal 1, PT) 75ft maintaining O2 saturation at or   above 90%  -MS      Time Frame (Gait Training Goal 1, PT) long term goal (LTG); by discharge    -MS      Progress/Outcome (Gait Training Goal 1, PT) goal ongoing  -MS            User Key  (r) = Recorded By, (t) = Taken By, (c) = Cosigned By    Initials Name Provider Type Discipline    Debora Perez, PT, DPT, NCS Physical Therapist  PT             PT OP Goals     Row Name 01/10/22 1141          Time Calculation    PT Goal Re-Cert Due Date 01/20/22  -MS           User Key  (r) = Recorded By, (t) = Taken By, (c) = Cosigned By    Initials Name Provider Type    Debora Perez AMARA, PT, DPT, NCS Physical Therapist                  Plan  PT Plan  Therapy Frequency (PT): 2 times/day  Predicted Duration of Therapy Intervention (PT): until discharge  Planned Therapy Interventions (PT): balance training, bed mobility training, gait training, patient/family education, strengthening, transfer training  Outcome Summary: The patient presents alert and oriented x4 but confused at times in conversation. She demonstrates generalized weakness and difficulty with attending to tasks. She was able to walk to the bathroom and to the chair with use of a walker. She does required assist with directing the walker. The patient was on 2L when I entered the room and I removed for mobility to the restroom. She desaturated to 87% so 2L was placed back on her and she increased to 94%. PT to continue to work with the patient on strengthening and increased activity. Recommend discharge SNF or home with 24/7 care.        Debora Villegas, PT, DPT, NCS  1/10/2022            By cosigning this order, either electronically or physically, I certify that the therapy services are furnished while this patient is under my care, the services outline above are required by this patient, and will be reviewed every 90 days.        M.D.:__________________________________________ Date: ______________    01/10/22 1144    01/10/22 0702  Inpatient General Surgery Consult  IN AM        Specialty:  General Surgery  Provider:  Naye Tavarez MD    01/09/22 0713    01/10/22 0600  Basic Metabolic Panel  Daily       01/09/22 1250    01/10/22 0600  CBC (No Diff)  Daily       01/09/22 1250    01/09/22 1331  Case Management  Consult  Once        Provider:  (Not yet assigned)     22 1330    22 1248  oxyCODONE-acetaminophen (PERCOCET) 5-325 MG per tablet 1 tablet  Every 4 Hours PRN         22 1248    22 0914  SLP Plan of Care Cert / Re-Cert  Once        Comments: Speech Language Pathology Plan of Care  Initial Certification  Certification Period: 2022 - 2022    Patient Name: Alissa Domingeuz  : 1945    (E87.1) Hyponatremia  (primary encounter diagnosis)  (S09.90XA) Closed head injury, initial encounter  (W19.XXXA) Fall, initial encounter  (R41.82) Altered mental status, unspecified altered mental status type  (R41.0) Delirium  (E87.6) Hypokalemia  (R13.10) Dysphagia, unspecified type                Assessment  SLP Assessment  Prior Level of Function-Communication: cognitive-linguistic impairment  Prior Level of Function-Swallowing: no diet consistency restrictions  SLP Swallowing Diagnosis: mild, functional oral phase, functional pharyngeal phase  Plan of Care Reviewed With: patient, other (see comments) (RNYohana. Reinforcements needed for pt.)  Swallow Criteria for Skilled Therapeutic Interventions Met: no problems identified which require skilled intervention  Therapy Frequency (Swallow): evaluation only                    Plan    SLP Plan  Therapy Frequency (Swallow): evaluation only        Tamera Lopes, CCC-SLP  2022      By cosigning this order, either electronically or physically, I certify that the therapy services are furnished while this patient is under my care, the services outline above are required by this patient, and will be reviewed every 90 days.        M.D.:__________________________________________ Date: ______________    2213    22 0600  levothyroxine (SYNTHROID, LEVOTHROID) tablet 75 mcg  Every Early Morning         22 1459    22 0600  CBC Auto Differential  Morning Draw         22 1459    22 0600  Lipid Panel  Morning Draw         22 1459    22 0600  TSH  Morning Draw          01/08/22 1459    01/09/22 0600  Hemoglobin A1c  Morning Draw         01/08/22 1459    01/09/22 0445  ziprasidone (GEODON) injection 10 mg  Once         01/09/22 0351    01/08/22 2100  docusate sodium (COLACE) capsule 100 mg  2 Times Daily         01/08/22 1459    01/08/22 2100  sodium chloride 0.9 % flush 10 mL  Every 12 Hours Scheduled         01/08/22 1459    01/08/22 1800  Incentive Spirometry  Every 4 Hours While Awake       01/08/22 1459    01/08/22 1800  Basic Metabolic Panel  Every 6 Hours,   Status:  Canceled       01/08/22 1459    01/08/22 1730  famotidine (PEPCID) tablet 20 mg  2 Times Daily Before Meals         01/08/22 1459    01/08/22 1600  Vital Signs  Every 4 Hours       01/08/22 1459    01/08/22 1555  COVID PRE-OP / PRE-PROCEDURE SCREENING ORDER (NO ISOLATION) - Swab, Nasal Cavity  Once         01/08/22 1555    01/08/22 1555  COVID-19,[facility] IN-HOUSE,Nasal Swab No Transport Media 3-4 HR TAT - Swab, Nasal Cavity  PROCEDURE ONCE         01/08/22 1555    01/08/22 1501  acetaminophen (TYLENOL) tablet 975 mg  Every 8 Hours         01/08/22 1459    01/08/22 1501  lactated ringers infusion  Continuous         01/08/22 1459    01/08/22 1500  Magnesium  Once         01/08/22 1459    01/08/22 1500  Intake & Output  Every Shift       01/08/22 1459    01/08/22 1500  Weigh Patient  Once         01/08/22 1459    01/08/22 1500  Oxygen Therapy- Nasal Cannula; Titrate for SPO2: 90% - 95%  Continuous         01/08/22 1459    01/08/22 1500  Insert Peripheral IV  Once         01/08/22 1459    01/08/22 1500  Saline Lock & Maintain IV Access  Continuous         01/08/22 1459    01/08/22 1500  Place Sequential Compression Device  Once         01/08/22 1459    01/08/22 1500  Maintain Sequential Compression Device  Continuous         01/08/22 1459    01/08/22 1500  Cardiac Monitoring  Continuous         01/08/22 1459    01/08/22 1500  Daily Weights  Daily       01/08/22 1459    01/08/22 1500  Strict Intake & Output   Every Hour       01/08/22 1459    01/08/22 1500  Diet Soft Texture; Ground  Diet Effective Now         01/08/22 1459    01/08/22 1500  OT Consult: Eval & Treat  Once         01/08/22 1459    01/08/22 1500  PT Consult: Eval & Treat Discharge Placement Assessment, Functional Mobility Below Baseline  Once         01/08/22 1459    01/08/22 1500  SLP Consult: Eval & Treat Swallow Disorder; Regular - No Dietary Restrictions  Once         01/08/22 1459    01/08/22 1459  ondansetron (ZOFRAN) injection 4 mg  Every 6 Hours PRN,   Status:  Discontinued         01/08/22 1459    01/08/22 1459  sodium chloride 0.9 % flush 10 mL  As Needed         01/08/22 1459    01/08/22 1345  Code Status and Medical Interventions:  Continuous         01/08/22 1352    01/08/22 1344  Inpatient Admission  Once         01/08/22 1343    01/08/22 1242  potassium chloride (KLOR-CON) packet 40 mEq  2 Times Daily         01/08/22 1240    01/08/22 1214  Cardiac Monitoring  Once,   Status:  Canceled         01/08/22 1213    01/08/22 1213  Initiate Observation Status  Once         01/08/22 1213    01/08/22 1204  potassium chloride 20 mEq in 100 mL IVPB  Once,   Status:  Discontinued         01/08/22 1202    01/08/22 1204  magnesium sulfate 2g/50 mL (PREMIX) infusion  Once         01/08/22 1202    01/08/22 1101  Urinalysis, Microscopic Only - Urine, Clean Catch  Once         01/08/22 1100    01/08/22 1058  Manual Differential  Once         01/08/22 1057    01/08/22 1035  Blood Culture - Blood, Arm, Right  Once         01/08/22 1034    01/08/22 1035  Blood Culture - Blood, Arm, Right  Once         01/08/22 1034    01/08/22 1035  Lactic Acid, Plasma  Once         01/08/22 1034    01/08/22 1035  Procalcitonin  Once         01/08/22 1034    01/08/22 1033  Troponin  Once         01/08/22 1032    01/08/22 1033  BNP  Once         01/08/22 1032    01/08/22 1033  Urinalysis With Culture If Indicated - Urine, Clean Catch  Once         01/08/22 1032    01/08/22 1033   C-reactive Protein  Once         01/08/22 1032    01/08/22 1033  Urine Drug Screen - Urine, Clean Catch  Once         01/08/22 1032    01/08/22 1033  XR Chest 1 View  1 Time Imaging         01/08/22 1032    01/08/22 1033  ECG 12 Lead  Once         01/08/22 1032    01/08/22 1015  Protime-INR  Once         01/08/22 1014    01/08/22 1015  aPTT  Once         01/08/22 1014    01/08/22 1015  CT Head Without Contrast  1 Time Imaging         01/08/22 1014    01/08/22 1015  CT Cervical Spine Without Contrast  1 Time Imaging         01/08/22 1014    01/08/22 1014  CBC & Differential  Once         01/08/22 1014    01/08/22 1014  Comprehensive Metabolic Panel  Once         01/08/22 1014    01/08/22 1014  CBC Auto Differential  PROCEDURE ONCE         01/08/22 1014    --  SCANNED - TELEMETRY           01/08/22 0000    --  SCANNED - TELEMETRY           01/08/22 0000                   Physician Progress Notes (last 72 hours)      Thang Aceves MD at 01/09/22 1244              [facility] Medicine Services  INPATIENT PROGRESS NOTE    Length of Stay: 1  Date of Admission: 1/8/2022  Primary Care Physician: Pb Mitchell APRN    Subjective   Chief Complaint: Altered mental status.  Nausea vomiting.  Hyponatremia.  Hypokalemia.    HPI   Altered mental status improving.  Nausea vomiting has resolved.  Hyponatremia is improving.  Hypokalemia resolved.  Blood pressure stable patient is afebrile.  Patient asked to be put back on her pain medication.  Discussed patient that is part of her problem, is a chronic pain medication.    Review of Systems   Constitutional: Positive for activity change, appetite change and fatigue. Negative for chills and fever.   HENT: Negative for hearing loss, nosebleeds, tinnitus and trouble swallowing.    Eyes: Negative for visual disturbance.   Respiratory: Negative for cough, chest tightness, shortness of breath and wheezing.    Cardiovascular: Negative for chest pain,  palpitations and leg swelling.   Gastrointestinal: Negative for abdominal distention, abdominal pain, blood in stool, constipation, diarrhea, nausea and vomiting.   Endocrine: Negative for cold intolerance, heat intolerance, polydipsia, polyphagia and polyuria.   Genitourinary: Negative for decreased urine volume, difficulty urinating, dysuria, flank pain, frequency and hematuria.   Musculoskeletal: Positive for arthralgias, gait problem and myalgias. Negative for joint swelling.   Skin: Negative for rash.   Allergic/Immunologic: Negative for immunocompromised state.   Neurological: Positive for weakness. Negative for dizziness, syncope, light-headedness and headaches.   Hematological: Negative for adenopathy. Does not bruise/bleed easily.   Psychiatric/Behavioral: Positive for confusion. Negative for sleep disturbance. The patient is not nervous/anxious.           All pertinent negatives and positives are as above. All other systems have been reviewed and are negative unless otherwise stated.     Objective    Temp:  [97.6 °F (36.4 °C)-98 °F (36.7 °C)] 98 °F (36.7 °C)  Heart Rate:  [] 79  Resp:  [16-22] 20  BP: (116-143)/(71-90) 122/90    Intake/Output Summary (Last 24 hours) at 1/9/2022 1244  Last data filed at 1/9/2022 0301  Gross per 24 hour   Intake 225 ml   Output 100 ml   Net 125 ml     Physical Exam  Vitals and nursing note reviewed.   Constitutional:       Appearance: She is well-developed.   HENT:      Head: Normocephalic.   Eyes:      Conjunctiva/sclera: Conjunctivae normal.      Pupils: Pupils are equal, round, and reactive to light.   Neck:      Vascular: No JVD.   Cardiovascular:      Rate and Rhythm: Normal rate and regular rhythm.      Heart sounds: Normal heart sounds. No murmur heard.  No friction rub. No gallop.    Pulmonary:      Effort: No respiratory distress.      Breath sounds: No wheezing or rales.      Comments: Diminished breath and bilateral, clear .  Chest:      Chest wall: No  tenderness.   Abdominal:      General: Bowel sounds are normal. There is no distension.      Palpations: Abdomen is soft.      Tenderness: There is no abdominal tenderness. There is no guarding or rebound.   Musculoskeletal:         General: No tenderness or deformity. Normal range of motion.      Cervical back: Neck supple.   Skin:     General: Skin is warm and dry.      Findings: No rash.      Comments: Status post left breast mastectomy, no sign cellulitis, wound looks clean dry and intact.  Drainage tubes left breast noted with tent blood inside the tube.   Neurological:      General: No focal deficit present.      Mental Status: She is alert.      Cranial Nerves: No cranial nerve deficit.      Motor: Weakness present. No abnormal muscle tone.      Coordination: Coordination abnormal.      Gait: Gait abnormal.      Deep Tendon Reflexes: Reflexes normal.   Psychiatric:         Behavior: Behavior normal.      Results Review:  Lab Results (last 24 hours)     Procedure Component Value Units Date/Time    Basic Metabolic Panel [[id number]]  (Abnormal) Collected: 01/09/22 1134    Specimen: Blood Updated: 01/09/22 1157     Glucose 118 mg/dL      BUN 10 mg/dL      Creatinine 0.52 mg/dL      Sodium 135 mmol/L      Potassium 4.5 mmol/L      Comment: Slight hemolysis detected by analyzer. Results may be affected.        Chloride 98 mmol/L      CO2 28.0 mmol/L      Calcium 8.5 mg/dL      eGFR Non African Amer 114 mL/min/1.73      BUN/Creatinine Ratio 19.2     Anion Gap 9.0 mmol/L     Narrative:      GFR Normal >60  Chronic Kidney Disease <60  Kidney Failure <15      Blood Culture - Blood, Arm, Right [[id number]]  (Normal) Collected: 01/08/22 1045    Specimen: Blood from Arm, Right Updated: 01/09/22 1115     Blood Culture No growth at 24 hours    Blood Culture - Blood, Arm, Right [[id number]]  (Normal) Collected: 01/08/22 1040    Specimen: Blood from Arm, Right Updated: 01/09/22 1115     Blood Culture No growth at 24 hours     Basic Metabolic Panel [[id number]]  (Abnormal) Collected: 01/09/22 0539    Specimen: Blood Updated: 01/09/22 0648     Glucose 92 mg/dL      BUN 11 mg/dL      Creatinine 0.57 mg/dL      Sodium 127 mmol/L      Potassium 3.8 mmol/L      Chloride 92 mmol/L      CO2 28.0 mmol/L      Calcium 8.9 mg/dL      eGFR Non African Amer 103 mL/min/1.73      BUN/Creatinine Ratio 19.3     Anion Gap 7.0 mmol/L     Narrative:      GFR Normal >60  Chronic Kidney Disease <60  Kidney Failure <15      Lipid Panel [[id number]]  (Abnormal) Collected: 01/09/22 0539    Specimen: Blood Updated: 01/09/22 0648     Total Cholesterol 150 mg/dL      Triglycerides 69 mg/dL      HDL Cholesterol 70 mg/dL      LDL Cholesterol  66 mg/dL      VLDL Cholesterol 14 mg/dL      LDL/HDL Ratio 0.95    Narrative:      Cholesterol Reference Ranges  (U.S. Department of Health and Human Services ATP III Classifications)    Desirable          <200 mg/dL  Borderline High    200-239 mg/dL  High Risk          >240 mg/dL      Triglyceride Reference Ranges  (U.S. Department of Health and Human Services ATP III Classifications)    Normal           <150 mg/dL  Borderline High  150-199 mg/dL  High             200-499 mg/dL  Very High        >500 mg/dL    HDL Reference Ranges  (U.S. Department of Health and Human Services ATP III Classifcations)    Low     <40 mg/dl (major risk factor for CHD)  High    >60 mg/dl ('negative' risk factor for CHD)        LDL Reference Ranges  (U.S. Department of Health and Human Services ATP III Classifcations)    Optimal          <100 mg/dL  Near Optimal     100-129 mg/dL  Borderline High  130-159 mg/dL  High             160-189 mg/dL  Very High        >189 mg/dL    TSH [[id number]]  (Normal) Collected: 01/09/22 0539    Specimen: Blood Updated: 01/09/22 0648     TSH 1.180 uIU/mL     Hemoglobin A1c [[id number]]  (Abnormal) Collected: 01/09/22 0539    Specimen: Blood Updated: 01/09/22 0631     Hemoglobin A1C 5.70 %     Narrative:      Hemoglobin A1C  Ranges:    Increased Risk for Diabetes  5.7% to 6.4%  Diabetes                     >= 6.5%  Diabetic Goal                < 7.0%    CBC Auto Differential [[id number]]  (Abnormal) Collected: 01/09/22 0539    Specimen: Blood Updated: 01/09/22 0620     WBC 6.63 10*3/mm3      RBC 5.75 10*6/mm3      Hemoglobin 13.2 g/dL      Hematocrit 43.5 %      MCV 75.7 fL      MCH 23.0 pg      MCHC 30.3 g/dL      RDW 16.6 %      RDW-SD 43.6 fl      MPV 9.4 fL      Platelets 284 10*3/mm3      Neutrophil % 69.1 %      Lymphocyte % 16.3 %      Monocyte % 10.6 %      Eosinophil % 3.0 %      Basophil % 0.5 %      Immature Grans % 0.5 %      Neutrophils, Absolute 4.59 10*3/mm3      Lymphocytes, Absolute 1.08 10*3/mm3      Monocytes, Absolute 0.70 10*3/mm3      Eosinophils, Absolute 0.20 10*3/mm3      Basophils, Absolute 0.03 10*3/mm3      Immature Grans, Absolute 0.03 10*3/mm3      nRBC 0.0 /100 WBC     Basic Metabolic Panel [[id number]]  (Abnormal) Collected: 01/09/22 0023    Specimen: Blood Updated: 01/09/22 0058     Glucose 77 mg/dL      BUN 12 mg/dL      Creatinine 0.60 mg/dL      Sodium 126 mmol/L      Potassium 3.8 mmol/L      Chloride 91 mmol/L      CO2 24.0 mmol/L      Calcium 8.7 mg/dL      eGFR Non African Amer 97 mL/min/1.73      BUN/Creatinine Ratio 20.0     Anion Gap 11.0 mmol/L     Narrative:      GFR Normal >60  Chronic Kidney Disease <60  Kidney Failure <15      Basic Metabolic Panel [[id number]]  (Abnormal) Collected: 01/08/22 2017    Specimen: Blood Updated: 01/08/22 2110     Glucose 149 mg/dL      BUN 11 mg/dL      Creatinine 0.71 mg/dL      Sodium 129 mmol/L      Potassium 3.1 mmol/L      Comment: Slight hemolysis detected by analyzer. Results may be affected.        Chloride 89 mmol/L      CO2 27.0 mmol/L      Calcium 9.1 mg/dL      eGFR Non African Amer 80 mL/min/1.73      BUN/Creatinine Ratio 15.5     Anion Gap 13.0 mmol/L     Narrative:      GFR Normal >60  Chronic Kidney Disease <60  Kidney Failure <15      COVID  PRE-OP / PRE-PROCEDURE SCREENING ORDER (NO ISOLATION) - Swab, Nasal Cavity [[id number]]  (Normal) Collected: 01/08/22 1618    Specimen: Swab from Nasal Cavity Updated: 01/08/22 1725    Narrative:      The following orders were created for panel order COVID PRE-OP / PRE-PROCEDURE SCREENING ORDER (NO ISOLATION) - Swab, Nasal Cavity.  Procedure                               Abnormality         Status                     ---------                               -----------         ------                     COVID-19,[facility]...[[id number]]  Normal              Final result                 Please view results for these tests on the individual orders.    COVID-19,[facility] IN-HOUSE,Nasal Swab No Transport Media 3-4 HR TAT - Swab, Nasal Cavity [[id number]]  (Normal) Collected: 01/08/22 1618    Specimen: Swab from Nasal Cavity Updated: 01/08/22 1725     COVID19 Not Detected    Narrative:      Fact sheet for providers: https://www.fda.gov/media/933855/download     Fact sheet for patients: https://www.fda.gov/media/009503/download    Test performed by PCR.    Consider negative results in combination with clinical observations, patient history, and epidemiological information.    Magnesium [[id number]]  (Normal) Collected: 01/08/22 1045    Specimen: Blood from Arm, Right Updated: 01/08/22 1508     Magnesium 1.9 mg/dL            Cultures:  Blood Culture   Date Value Ref Range Status   01/08/2022 No growth at 24 hours  Preliminary   01/08/2022 No growth at 24 hours  Preliminary       Radiology Data:    Imaging Results (Last 24 Hours)     ** No results found for the last 24 hours. **          Allergies   Allergen Reactions   • Augmentin [Amoxicillin-Pot Clavulanate] Other (See Comments)     UNKNOWN   • Ceclor [Cefaclor] Other (See Comments)     UNKNOWN     • Codeine Other (See Comments)     Not Specified   • Darvon [Propoxyphene] Other (See Comments)     Not Specified   • Fulvicin P-G [Griseofulvin] Other (See Comments)      UNKNOWN   • Niacin Other (See Comments)     UNKNOWN   • Valium [Diazepam] Other (See Comments)     UNKNOWN       Scheduled meds:   acetaminophen, 975 mg, Oral, Q8H  docusate sodium, 100 mg, Oral, BID  famotidine, 20 mg, Oral, BID AC  levothyroxine, 75 mcg, Oral, Q AM  potassium chloride, 40 mEq, Oral, BID  sodium chloride, 10 mL, Intravenous, Q12H        PRN meds:  sodium chloride    Assessment/Plan       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 Improving.   CT head. No acute intracranial abnormalities.  CT cervical spine. No acute osseous posttraumatic findings.  Chest x-ray-Mild cardiomegaly, with chronic interstitial lung changes.     Nausea/vomiting. Pepcid. Zofran as needed.     Hyponatremia. Improving. Decrease LR to 50 cc an hour.       Hypokalemia.  Resolved.  Magnesium- normal.       History of neoplasm of the left breast.  Status post mastectomy of the left breast on November 30, 2021.  Status post hematoma evacuation December 21, 2021, AILYN tube is in place left breast.  Consult Dr. Tavarez tomorrow morning.  UDS positive for oxycodone.     Hypertension.  Hold Maxide and Diovan for now due to hypotension.     Constipation.  Continue Colace.     Hypothyroidism.  Continue Synthroid.     Chronic pain.  Hold oxycodone for now. Percocet prn .      Discharge Planning: Patient will need rehab placement.  DNR.  DNI.    Electronically signed by Thang Aceves MD, 01/09/22, 12:44 PM CST.                    Electronically signed by Thang Aceves MD at 01/09/22 1332          Consult Notes (last 72 hours)      Naye Tavarez MD at 01/10/22 0956      Consult Orders    1. Inpatient General Surgery Consult [[id number]] ordered by Thang Aceves MD at 01/09/22 0733               Naye Tavarez MD Consult Note - General Surgery     Referring  Provider: Provider, No Known    Patient Care Team:  Pb Mitchell APRN as PCP - General (Family Medicine)  Pb Mitchell APRN as Referring Physician (Family Medicine)  Martin Vasquez DO as Cardiologist (Cardiology)  Naye Tavarez MD as Consulting Physician (General Surgery)    Chief complaint AILYN drain in place     Subjective .     History of present illness:  Ms. Dominguez is a 76 year old female well known to me s/p left mastectomy with sentinel lymph node biopsy for invasive breast cancer. Unfortunately, she had a delayed hematoma/seroma formation and required operative washout. She presented over the weekend with altered mental status and was admitted. She has a AILYN drain in place from her surgery. It has been off suction x 9 days with no fluid reaccumulation in the breast. She accidentally tore part of the drain off during a fall.     Review of Systems  All systems were reviewed and negative except for: altered mental status and fall     Constitution:chills, fevers, night sweats and weight loss, weight gain  Eyes:  double vision, blurriness and loss of vision  ENT:  earaches, hearing loss and hoarseness  Respiratory:  cough, dry, cough, productive, hemoptysis and shortness of air  Cardiovascular:  chest pressure / pain, at rest, chest pressure / pain, on exertion, irregular pulse and palpitations  Gastrointestinal: bright red blood per rectum, change in bowel habits, constipation, diarrhea, heartburn, hematemesis, melena, nausea, pain and vomiting  Genitourinary:  difficulty / inability to void, pain, blood in urine and painful urination  Integument:  itching, rash, redness and swelling  Breast:  lump / mass, nipple discharge and pain  Hematologic / Lymphatic: easy bruising and lymphadenopathy  Musculoskeletal: joint pain, muscle pain and muscle weakness  Neurological: dizziness, loss of consciousness, numbness, vertigo and weakness  Behavioral/Psych: anxiety and depression  Endocrine:  diabetes, thyroid disorder      History  Past Medical History:   Diagnosis Date   • Cataracts, bilateral    • Cellulitis of left lower limb    • Cellulitis of right lower limb    • COPD (chronic obstructive pulmonary disease) ([facility])    • DJD (degenerative joint disease), cervical     DJD C Spine/Spondylolysis, Cervical Region   • Essential (primary) hypertension    • GERD (gastroesophageal reflux disease)    • Hematoma     Left mastectomy site   • Hypothyroidism    • Major depressive disorder, recurrent, moderate ([facility])    • Malignant neoplasm of left breast ([facility])    • Migraine    • Morbid (severe) obesity due to excess calories ([facility])    • Obstructive sleep apnea    • PONV (postoperative nausea and vomiting)    • Primary generalized (osteo)arthritis    • Pure hypercholesterolemia    • Tinea unguium    • Type 2 diabetes mellitus with hyperglycemia ([facility])    • Venous insufficiency (chronic) (peripheral)    ,   Past Surgical History:   Procedure Laterality Date   • BREAST ABSCESS INCISION AND DRAINAGE Left 12/21/2021    Procedure: EVACUATION OF LEFT MASTECTOMY HEMATOMA;  Surgeon: Naye Tavarez MD;  Location: [facility] OR;  Service: General;  Laterality: Left;   • BREAST SURGERY Left     Left mastectomy   • CATARACT EXTRACTION, BILATERAL     • CATARACT EXTRACTION, BILATERAL     • ESSURE TUBAL LIGATION     • HYSTERECTOMY     • MASTECTOMY W/ SENTINEL NODE BIOPSY Left 11/30/2021    Procedure: LEFT MASTECTOMY WITH SENTINEL LYMPH NODE BIOPSY WITH MAGTRACE;  Surgeon: Naye Tavarez MD;  Location:  [facility] OR;  Service: General;  Laterality: Left;   • VARICOSE VEIN SURGERY Right    ,   Family History   Problem Relation Age of Onset   • Hypertension Sister    • Heart disease Mother    ,   Social History     Tobacco Use   • Smoking status: Current Every Day Smoker     Packs/day: 1.00     Types: Electronic Cigarette   • Smokeless tobacco: Never Used   Vaping Use   • Vaping Use: Every day   • Substances: Nicotine, patient  "states it has \"3%\" not sure if it is THC or CBD   • Devices: Disposable   • Passive vaping exposure: Yes   Substance Use Topics   • Alcohol use: No   • Drug use: No   ,   Medications Prior to Admission   Medication Sig Dispense Refill Last Dose   • acetaminophen (Tylenol) 325 MG tablet Take 3 tablets by mouth Every 8 (Eight) Hours. Take every 8 hours for 3 days then take prn as needed. 100 tablet 2    • docusate sodium (COLACE) 100 MG capsule Take 100 mg by mouth 2 (Two) Times a Day.      • levothyroxine (Euthyrox) 75 MCG tablet Take 75 mcg by mouth Daily.      • ondansetron (Zofran) 4 MG tablet Take 1 tablet by mouth Every 8 (Eight) Hours As Needed for Nausea. 15 tablet 0    • ondansetron (Zofran) 4 MG tablet Take 1 tablet by mouth Every 8 (Eight) Hours As Needed for Nausea. 15 tablet 0    • oxyCODONE (Roxicodone) 5 MG immediate release tablet Take 1 tablet by mouth Every 8 (Eight) Hours As Needed for Severe Pain . 5 tablet 0    • oxyCODONE-acetaminophen (PERCOCET)  MG per tablet Take 1 tablet by mouth Every 8 (Eight) Hours As Needed for Moderate Pain .      • triamterene-hydrochlorothiazide (MAXZIDE) 75-50 MG per tablet Take 1 tablet by mouth Daily.      • valsartan (DIOVAN) 80 MG tablet Take 80 mg by mouth 2 (Two) Times a Day.       and Allergies:  Augmentin [amoxicillin-pot clavulanate], Ceclor [cefaclor], Codeine, Darvon [propoxyphene], Fulvicin p-g [griseofulvin], Niacin, and Valium [diazepam]    Current Facility-Administered Medications:   •  acetaminophen (TYLENOL) tablet 975 mg, 975 mg, Oral, Q8H, Thang Aceves MD, 975 mg at 01/10/22 0604  •  docusate sodium (COLACE) capsule 100 mg, 100 mg, Oral, BID, Thang Aceves MD, 100 mg at 01/10/22 0822  •  famotidine (PEPCID) tablet 20 mg, 20 mg, Oral, BID AC, Thang Aceves MD, 20 mg at 01/10/22 0822  •  lactated ringers infusion, 50 mL/hr, Intravenous, Continuous, Thang Aceves MD, Last Rate: 50 mL/hr at 01/09/22 2135, 50 mL/hr at 01/09/22 2135  •  " levothyroxine (SYNTHROID, LEVOTHROID) tablet 75 mcg, 75 mcg, Oral, Q AM, Thang Aceves MD, 75 mcg at 01/10/22 0604  •  oxyCODONE-acetaminophen (PERCOCET) 5-325 MG per tablet 1 tablet, 1 tablet, Oral, Q4H PRN, Thang Aceves MD, 1 tablet at 01/09/22 1324  •  potassium chloride (KLOR-CON) packet 40 mEq, 40 mEq, Oral, BID, Thang Aceves MD, 40 mEq at 01/10/22 0822  •  sodium chloride 0.9 % flush 10 mL, 10 mL, Intravenous, Q12H, Thang Aceves MD, 10 mL at 01/10/22 0823  •  sodium chloride 0.9 % flush 10 mL, 10 mL, Intravenous, PRN, Thang Aceves MD    Objective     Vital Signs   Temp:  [97.6 °F (36.4 °C)-98.6 °F (37 °C)] 98.6 °F (37 °C)  Heart Rate:  [] 102  Resp:  [18-20] 18  BP: (115-125)/(63-92) 118/92    Physical Exam:  General appearance - well appearing, and in no distress  Mental status - alert but not oriented to date   Eyes - sclera anicteric  Neck - supple, no significant adenopathy  Chest - no tachypnea, retractions or cyanosis  Heart - normal rate and regular rhythm  Abdomen - soft, nontender, nondistended, no masses or organomegaly  Breast - left mastectomy incision well healed with no seroma/hematoma formation. AILYN Drain removed.     Results Review:    Lab Results (last 24 hours)     Procedure Component Value Units Date/Time    Basic Metabolic Panel [[id number]]  (Abnormal) Collected: 01/09/22 1134    Specimen: Blood Updated: 01/09/22 1157     Glucose 118 mg/dL      BUN 10 mg/dL      Creatinine 0.52 mg/dL      Sodium 135 mmol/L      Potassium 4.5 mmol/L      Comment: Slight hemolysis detected by analyzer. Results may be affected.        Chloride 98 mmol/L      CO2 28.0 mmol/L      Calcium 8.5 mg/dL      eGFR Non African Amer 114 mL/min/1.73      BUN/Creatinine Ratio 19.2     Anion Gap 9.0 mmol/L     Narrative:      GFR Normal >60  Chronic Kidney Disease <60  Kidney Failure <15      Blood Culture - Blood, Arm, Right [[id number]]  (Normal) Collected: 01/08/22 1045    Specimen: Blood from Arm,  Right Updated: 01/09/22 1115     Blood Culture No growth at 24 hours    Blood Culture - Blood, Arm, Right [[id number]]  (Normal) Collected: 01/08/22 1040    Specimen: Blood from Arm, Right Updated: 01/09/22 1115     Blood Culture No growth at 24 hours        Imaging Results (Last 24 Hours)     ** No results found for the last 24 hours. **                Assessment/Plan       Ms. Dominguez is a 76 year old female s/p left mastectomy with sentinel lymph node biopsy and subsequent hematoma washout. AILYN Drain removed at bedside today. Follow up with me in 1 month after discharge.       Naye Tavarez MD  01/10/22  09:56 CST              Electronically signed by Naye Tavarez MD at 01/10/22 1002

## 2022-01-10 NOTE — THERAPY TREATMENT NOTE
Acute Care - Physical Therapy Treatment Note  [facility]     Patient Name: Alissa Dominguez  : 1945  MRN: [id number]  Today's Date: 1/10/2022      Visit Dx:     ICD-10-CM ICD-9-CM   1. Hyponatremia  E87.1 276.1   2. Closed head injury, initial encounter  S09.90XA 959.01   3. Fall, initial encounter  W19.XXXA E888.9   4. Altered mental status, unspecified altered mental status type  R41.82 780.97   5. Delirium  R41.0 780.09   6. Hypokalemia  E87.6 276.8   7. Dysphagia, unspecified type  R13.10 787.20   8. Impaired mobility  Z74.09 799.89     Patient Active Problem List   Diagnosis   • Venous insufficiency (chronic) (peripheral)   • Type 2 diabetes mellitus with hyperglycemia (HCC)   • Pure hypercholesterolemia   • Essential (primary) hypertension   • Preoperative evaluation to rule out surgical contraindication   • Abnormal EKG   • Shortness of breath   • 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, estrogen receptor positive (HCC)   • Breast mass   • Hematoma (nontraumatic) of breast   • Altered mental status   • Hyponatremia   • Hyponatremia   • Hypokalemia     Past Medical History:   Diagnosis Date   • Cataracts, bilateral    • Cellulitis of left lower limb    • Cellulitis of right lower limb    • COPD (chronic obstructive pulmonary disease) ([facility])    • DJD (degenerative joint disease), cervical     DJD C Spine/Spondylolysis, Cervical Region   • Essential (primary) hypertension    • GERD (gastroesophageal reflux disease)    • Hematoma     Left mastectomy site   • Hypothyroidism    • Major depressive disorder, recurrent, moderate ([facility])    • Malignant neoplasm of left breast ([facility])    • Migraine    • Morbid (severe) obesity due to excess calories ([facility])    • Obstructive sleep apnea    • PONV (postoperative nausea and vomiting)    • Primary generalized (osteo)arthritis    • Pure hypercholesterolemia    • Tinea unguium    • Type 2 diabetes mellitus with hyperglycemia (HCC)    • Venous  insufficiency (chronic) (peripheral)      Past Surgical History:   Procedure Laterality Date   • BREAST ABSCESS INCISION AND DRAINAGE Left 12/21/2021    Procedure: EVACUATION OF LEFT MASTECTOMY HEMATOMA;  Surgeon: Naye Tavarez MD;  Location:  [facility] OR;  Service: General;  Laterality: Left;   • BREAST SURGERY Left     Left mastectomy   • CATARACT EXTRACTION, BILATERAL     • CATARACT EXTRACTION, BILATERAL     • ESSURE TUBAL LIGATION     • HYSTERECTOMY     • MASTECTOMY W/ SENTINEL NODE BIOPSY Left 11/30/2021    Procedure: LEFT MASTECTOMY WITH SENTINEL LYMPH NODE BIOPSY WITH MAGTRACE;  Surgeon: Naye Tavarez MD;  Location:  [facility] OR;  Service: General;  Laterality: Left;   • VARICOSE VEIN SURGERY Right      PT Assessment (last 12 hours)     PT Evaluation and Treatment     Row Name 01/10/22 1319          Physical Therapy Time and Intention    Subjective Information complains of; weakness; fatigue; pain  -LC     Document Type therapy note (daily note)  -     Mode of Treatment physical therapy  -     Row Name 01/10/22 1319          General Information    Existing Precautions/Restrictions fall  -     Row Name 01/10/22 1319          Pain Scale: FACES Pre/Post-Treatment    Pain: FACES Scale, Pretreatment 6-->hurts even more  -LC     Posttreatment Pain Rating 6-->hurts even more  -LC     Pain Location - Orientation generalized  -     Pain Location neck  -LC     Row Name 01/10/22 1319          Bed Mobility    Bed Mobility sit-supine  -LC     Sit-Supine [place] (Bed Mobility) verbal cues; contact guard  -     Assistive Device (Bed Mobility) head of bed elevated  -     Row Name 01/10/22 1319          Gait/Stairs (Locomotion)    [place] (Gait) verbal cues; contact guard  -     Assistive Device (Gait) walker, front-wheeled  -     Distance in Feet (Gait) 15' x2  -LC     Pattern (Gait) step-through  -     Deviations/Abnormal Patterns (Gait) gait speed decreased; stride length decreased   -     Bilateral Gait Deviations forward flexed posture  -     Comment (Gait/Stairs) to BR then to bed  -     Row Name 01/10/22 1319          Motor Skills    Therapeutic Exercise aerobic  -     Row Name 01/10/22 1319          Aerobic Exercise    Comment, Aerobic Exercise (Therapeutic Exercise) BLE AROM x15 reps seated  -     Row Name 01/10/22 1319          Positioning and Restraints    Pre-Treatment Position sitting in chair/recliner  -     Post Treatment Position bed  -     In Bed fowlers; call light within reach; encouraged to call for assist; exit alarm on  -           User Key  (r) = Recorded By, (t) = Taken By, (c) = Cosigned By    Initials Name Provider Type    LC Annie Young, PTA Physical Therapy Assistant                Physical Therapy Education                 Title: PT OT SLP Therapies (In Progress)     Topic: Physical Therapy (In Progress)     Point: Mobility training (In Progress)     Learning Progress Summary           Patient Acceptance, E, NR by MS at 1/10/2022 1142    Comment: role of PT in her care                   Point: Home exercise program (Not Started)     Learner Progress:  Not documented in this visit.          Point: Body mechanics (Not Started)     Learner Progress:  Not documented in this visit.          Point: Precautions (Not Started)     Learner Progress:  Not documented in this visit.                      User Key     Initials Effective Dates Name Provider Type Discipline    MS 06/19/18 -  Debora Villegas, PT, DPT, NCS Physical Therapist PT              PT Recommendation and Plan             Time Calculation:    PT Charges     Row Name 01/10/22 1352 01/10/22 1141          Time Calculation    Start Time 1319  -LC 0800  -MS     Stop Time 1349  -LC 0840  -MS     Time Calculation (min) 30 min  -LC 40 min  -MS     PT Received On 01/10/22  - 01/10/22  -MS     PT Goal Re-Cert Due Date -- 01/20/22  -MS            Time Calculation- PT    Total Timed Code Minutes- PT 30  minute(s)  -LC --            Untimed Charges    PT Eval/Re-eval Minutes -- 40  -MS            Total Minutes    Untimed Charges Total Minutes -- 40  -MS      Total Minutes -- 40  -MS           User Key  (r) = Recorded By, (t) = Taken By, (c) = Cosigned By    Initials Name Provider Type    Debora Perez R, PT, DPT, NCS Physical Therapist    LC Annie Young PTA Physical Therapy Assistant              Therapy Charges for Today     Code Description Service Date Service Provider Modifiers Qty    [id number] HC GAIT TRAINING EA 15 MIN 1/10/2022 Annie Young PTA GP 2          PT G-Codes  Outcome Measure Options: AM-PAC 6 Clicks Basic Mobility (PT)  AM-PAC 6 Clicks Score (PT): 16    Annie Young PTA  1/10/2022

## 2022-01-10 NOTE — PLAN OF CARE
Goal Outcome Evaluation:  Plan of Care Reviewed With: patient, daughter        Progress: no change  Outcome Summary: Pt alert to person and place but confused at times. Up with assistance and a walker. Pt pulling nasal cannula,  and telemtry off. Pt will desat when she pulls off O2. Bedalarm in place, daughter went home.

## 2022-01-10 NOTE — THERAPY EVALUATION
Patient Name: Alissa Dominguez  : 1945    MRN: [id number]                              Today's Date: 1/10/2022       Admit Date: 2022    Visit Dx:     ICD-10-CM ICD-9-CM   1. Hyponatremia  E87.1 276.1   2. Closed head injury, initial encounter  S09.90XA 959.01   3. Fall, initial encounter  W19.XXXA E888.9   4. Altered mental status, unspecified altered mental status type  R41.82 780.97   5. Delirium  R41.0 780.09   6. Hypokalemia  E87.6 276.8   7. Dysphagia, unspecified type  R13.10 787.20   8. Impaired mobility  Z74.09 799.89     Patient Active Problem List   Diagnosis   • Venous insufficiency (chronic) (peripheral)   • Type 2 diabetes mellitus with hyperglycemia (HCC)   • Pure hypercholesterolemia   • Essential (primary) hypertension   • Preoperative evaluation to rule out surgical contraindication   • Abnormal EKG   • Shortness of breath   • 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, estrogen receptor positive (HCC)   • Breast mass   • Hematoma (nontraumatic) of breast   • Altered mental status   • Hyponatremia   • Hyponatremia   • Hypokalemia     Past Medical History:   Diagnosis Date   • Cataracts, bilateral    • Cellulitis of left lower limb    • Cellulitis of right lower limb    • COPD (chronic obstructive pulmonary disease) ([facility])    • DJD (degenerative joint disease), cervical     DJD C Spine/Spondylolysis, Cervical Region   • Essential (primary) hypertension    • GERD (gastroesophageal reflux disease)    • Hematoma     Left mastectomy site   • Hypothyroidism    • Major depressive disorder, recurrent, moderate (HCC)    • Malignant neoplasm of left breast (HCC)    • Migraine    • Morbid (severe) obesity due to excess calories ([facility])    • Obstructive sleep apnea    • PONV (postoperative nausea and vomiting)    • Primary generalized (osteo)arthritis    • Pure hypercholesterolemia    • Tinea unguium    • Type 2 diabetes mellitus with hyperglycemia (HCC)    • Venous insufficiency  (chronic) (peripheral)      Past Surgical History:   Procedure Laterality Date   • BREAST ABSCESS INCISION AND DRAINAGE Left 12/21/2021    Procedure: EVACUATION OF LEFT MASTECTOMY HEMATOMA;  Surgeon: Naye Tavarez MD;  Location:  [facility] OR;  Service: General;  Laterality: Left;   • BREAST SURGERY Left     Left mastectomy   • CATARACT EXTRACTION, BILATERAL     • CATARACT EXTRACTION, BILATERAL     • ESSURE TUBAL LIGATION     • HYSTERECTOMY     • MASTECTOMY W/ SENTINEL NODE BIOPSY Left 11/30/2021    Procedure: LEFT MASTECTOMY WITH SENTINEL LYMPH NODE BIOPSY WITH MAGTRACE;  Surgeon: Naye Tavarez MD;  Location:  [facility] OR;  Service: General;  Laterality: Left;   • VARICOSE VEIN SURGERY Right       General Information     Row Name 01/10/22 0800          Physical Therapy Time and Intention    Document Type evaluation  AMS, nausea/vomiting, hypnatremia, s/p L breast mastectomy due to L breast ca  -MS     Mode of Treatment physical therapy; individual therapy  -MS     Row Name 01/10/22 0800          General Information    Patient Profile Reviewed yes  -MS     Prior Level of Function independent:; all household mobility; ADL's  pt poor historian  -MS     Existing Precautions/Restrictions fall  -MS     Barriers to Rehab cognitive status  -MS     Row Name 01/10/22 0800          Living Environment    Lives With alone  -MS     Row Name 01/10/22 0800          Cognition    Orientation Status (Cognition) oriented x 4  but confused in conversation  -MS     Row Name 01/10/22 0800          Safety Issues, Functional Mobility    Impairments Affecting Function (Mobility) endurance/activity tolerance; strength  -MS           User Key  (r) = Recorded By, (t) = Taken By, (c) = Cosigned By    Initials Name Provider Type    MS Debora Villegas R, PT, DPT, NCS Physical Therapist               Mobility     Row Name 01/10/22 0800          Bed Mobility    Bed Mobility supine-sit  -MS     Supine-Sit [place] (Bed Mobility) contact  guard; verbal cues; nonverbal cues (demo/gesture)  -MS     Assistive Device (Bed Mobility) head of bed elevated  -MS     Row Name 01/10/22 0800          Sit-Stand Transfer    Sit-Stand [place] (Transfers) contact guard; verbal cues; nonverbal cues (demo/gesture)  -MS     Row Name 01/10/22 0800          Gait/Stairs (Locomotion)    [place] Level (Gait) contact guard; verbal cues; nonverbal cues (demo/gesture)  -MS     Assistive Device (Gait) walker, front-wheeled  -MS     Distance in Feet (Gait) 20ft x2 limited by fatigue  -MS           User Key  (r) = Recorded By, (t) = Taken By, (c) = Cosigned By    Initials Name Provider Type    Debora Perez, PT, DPT, NCS Physical Therapist               Obj/Interventions     Row Name 01/10/22 0800          Range of Motion Comprehensive    General Range of Motion no range of motion deficits identified  -MS     Row Name 01/10/22 0800          Strength Comprehensive (MMT)    Comment, General Manual Muscle Testing (MMT) Assessment grossly 4/5  -MS     Row Name 01/10/22 0800          Balance    Balance Assessment sitting static balance; sitting dynamic balance; standing static balance; standing dynamic balance  -MS     Static Sitting Balance WFL  -MS     Dynamic Sitting Balance WFL  -MS     Static Standing Balance mild impairment; supported  -MS     Dynamic Standing Balance mild impairment; supported  -MS           User Key  (r) = Recorded By, (t) = Taken By, (c) = Cosigned By    Initials Name Provider Type    Debora Perez, PT, DPT, NCS Physical Therapist               Goals/Plan     Row Name 01/10/22 0800          Bed Mobility Goal 1 (PT)    Activity/Assistive Device (Bed Mobility Goal 1, PT) bed mobility activities, all  -MS     [place] Level/Cues Needed (Bed Mobility Goal 1, PT) independent  -MS     Time Frame (Bed Mobility Goal 1, PT) long term goal (LTG); by discharge  -MS     Progress/Outcomes (Bed Mobility Goal 1, PT) goal ongoing  -MS     Row Name  01/10/22 0800          Transfer Goal 1 (PT)    Activity/Assistive Device (Transfer Goal 1, PT) sit-to-stand/stand-to-sit; bed-to-chair/chair-to-bed; walker, rolling  -MS     [place] Level/Cues Needed (Transfer Goal 1, PT) modified independence  -MS     Time Frame (Transfer Goal 1, PT) long term goal (LTG); by discharge  -MS     Progress/Outcome (Transfer Goal 1, PT) goal ongoing  -MS     Row Name 01/10/22 0800          Gait Training Goal 1 (PT)    Activity/Assistive Device (Gait Training Goal 1, PT) gait (walking locomotion); assistive device use; walker, rolling  -MS     [place] Level (Gait Training Goal 1, PT) supervision required  -MS     Distance (Gait Training Goal 1, PT) 75ft maintaining O2 saturation at or above 90%  -MS     Time Frame (Gait Training Goal 1, PT) long term goal (LTG); by discharge  -MS     Progress/Outcome (Gait Training Goal 1, PT) goal ongoing  -MS           User Key  (r) = Recorded By, (t) = Taken By, (c) = Cosigned By    Initials Name Provider Type    Luiz Perezanda R, PT, DPT, NCS Physical Therapist               Clinical Impression     Row Name 01/10/22 0800          Pain    Additional Documentation Pain Scale: FACES Pre/Post-Treatment (Group)  -MS     Row Name 01/10/22 0800          Pain Scale: FACES Pre/Post-Treatment    Pain: FACES Scale, Pretreatment 8-->hurts whole lot  -MS     Posttreatment Pain Rating 8-->hurts whole lot  -MS     Pain Location - Orientation generalized  -MS     Pre/Posttreatment Pain Comment chronic pain  -MS     Row Name 01/10/22 0800          Plan of Care Review    Plan of Care Reviewed With patient  -MS     Progress no change  -MS     Outcome Summary The patient presents alert and oriented x4 but confused at times in conversation. She demonstrates generalized weakness and difficulty with attending to tasks. She was able to walk to the bathroom and to the chair with use of a walker. She does required assist with directing the walker. The patient was  on 2L when I entered the room and I removed for mobility to the restroom. She desaturated to 87% so 2L was placed back on her and she increased to 94%. PT to continue to work with the patient on strengthening and increased activity. Recommend discharge SNF or home with 24/7 care.  -MS     Row Name 01/10/22 0800          Therapy Assessment/Plan (PT)    Patient/Family Therapy Goals Statement (PT) get out of bed  -MS     Rehab Potential (PT) good, to achieve stated therapy goals  -MS     Criteria for Skilled Interventions Met (PT) yes; meets criteria; skilled treatment is necessary  -MS     Predicted Duration of Therapy Intervention (PT) until discharge  -MS     Row Name 01/10/22 0800          Vital Signs    Pre SpO2 (%) 98  -MS     O2 Delivery Pre Treatment supplemental O2  -MS     Intra SpO2 (%) 87  -MS     O2 Delivery Intra Treatment room air  -MS     Post SpO2 (%) 94  -MS     O2 Delivery Post Treatment supplemental O2  -MS     Row Name 01/10/22 0800          Positioning and Restraints    Post Treatment Position chair  -MS     In Chair sitting; call light within reach; encouraged to call for assist; with family/caregiver  -MS           User Key  (r) = Recorded By, (t) = Taken By, (c) = Cosigned By    Initials Name Provider Type    MS Bakari Debora R, PT, DPT, NCS Physical Therapist               Outcome Measures     Row Name 01/10/22 0800          How much help from another person do you currently need...    Turning from your back to your side while in flat bed without using bedrails? 3  -MS     Moving from lying on back to sitting on the side of a flat bed without bedrails? 3  -MS     Moving to and from a bed to a chair (including a wheelchair)? 3  -MS     Standing up from a chair using your arms (e.g., wheelchair, bedside chair)? 3  -MS     Climbing 3-5 steps with a railing? 1  -MS     To walk in hospital room? 3  -MS     AM-PAC 6 Clicks Score (PT) 16  -MS     Row Name 01/10/22 0800          Functional Assessment     Outcome Measure Options AM-PAC 6 Clicks Basic Mobility (PT)  -MS           User Key  (r) = Recorded By, (t) = Taken By, (c) = Cosigned By    Initials Name Provider Type    MS Debora Villegas, PT, DPT, NCS Physical Therapist                             Physical Therapy Education                 Title: PT OT SLP Therapies (In Progress)     Topic: Physical Therapy (In Progress)     Point: Mobility training (In Progress)     Learning Progress Summary           Patient Acceptance, E, NR by MS at 1/10/2022 1142    Comment: role of PT in her care                   Point: Home exercise program (Not Started)     Learner Progress:  Not documented in this visit.          Point: Body mechanics (Not Started)     Learner Progress:  Not documented in this visit.          Point: Precautions (Not Started)     Learner Progress:  Not documented in this visit.                      User Key     Initials Effective Dates Name Provider Type Discipline    MS 06/19/18 -  BakariDebora, PT, DPT, NCS Physical Therapist PT              PT Recommendation and Plan  Planned Therapy Interventions (PT): balance training, bed mobility training, gait training, patient/family education, strengthening, transfer training  Plan of Care Reviewed With: patient  Progress: no change  Outcome Summary: The patient presents alert and oriented x4 but confused at times in conversation. She demonstrates generalized weakness and difficulty with attending to tasks. She was able to walk to the bathroom and to the chair with use of a walker. She does required assist with directing the walker. The patient was on 2L when I entered the room and I removed for mobility to the restroom. She desaturated to 87% so 2L was placed back on her and she increased to 94%. PT to continue to work with the patient on strengthening and increased activity. Recommend discharge SNF or home with 24/7 care.     Time Calculation:    PT Charges     Row Name 01/10/22 1141             Time  Calculation    Start Time 0800  -MS      Stop Time 0840  -MS      Time Calculation (min) 40 min  -MS      PT Received On 01/10/22  -MS      PT Goal Re-Cert Due Date 01/20/22  -MS              Untimed Charges    PT Eval/Re-eval Minutes 40  -MS              Total Minutes    Untimed Charges Total Minutes 40  -MS       Total Minutes 40  -MS            User Key  (r) = Recorded By, (t) = Taken By, (c) = Cosigned By    Initials Name Provider Type    MS Debora Villegas, PT, DPT, NCS Physical Therapist              Therapy Charges for Today     Code Description Service Date Service Provider Modifiers Qty    13483497309 HC PT EVAL LOW COMPLEXITY 3 1/10/2022 Debora Villegas PT, DPT, NCS GP 1          PT G-Codes  Outcome Measure Options: AM-PAC 6 Clicks Basic Mobility (PT)  AM-PAC 6 Clicks Score (PT): 16    Debora Villegas, PT, DPT, NCS  1/10/2022

## 2022-01-10 NOTE — CONSULTS
Naye Tavarez MD Consult Note - General Surgery     Referring Provider: Provider, No Known    Patient Care Team:  Pb Mitchell APRN as PCP - General (Family Medicine)  Pb Mitchell APRN as Referring Physician (Family Medicine)  Martin Vasquez DO as Cardiologist (Cardiology)  Naye Tavarez MD as Consulting Physician (General Surgery)    Chief complaint AILYN drain in place     Subjective .     History of present illness:  Ms. Dominguez is a 76 year old female well known to me s/p left mastectomy with sentinel lymph node biopsy for invasive breast cancer. Unfortunately, she had a delayed hematoma/seroma formation and required operative washout. She presented over the weekend with altered mental status and was admitted. She has a AILYN drain in place from her surgery. It has been off suction x 9 days with no fluid reaccumulation in the breast. She accidentally tore part of the drain off during a fall.     Review of Systems  All systems were reviewed and negative except for: altered mental status and fall     Constitution:chills, fevers, night sweats and weight loss, weight gain  Eyes:  double vision, blurriness and loss of vision  ENT:  earaches, hearing loss and hoarseness  Respiratory:  cough, dry, cough, productive, hemoptysis and shortness of air  Cardiovascular:  chest pressure / pain, at rest, chest pressure / pain, on exertion, irregular pulse and palpitations  Gastrointestinal: bright red blood per rectum, change in bowel habits, constipation, diarrhea, heartburn, hematemesis, melena, nausea, pain and vomiting  Genitourinary:  difficulty / inability to void, pain, blood in urine and painful urination  Integument:  itching, rash, redness and swelling  Breast:  lump / mass, nipple discharge and pain  Hematologic / Lymphatic: easy bruising and lymphadenopathy  Musculoskeletal: joint pain, muscle pain and muscle weakness  Neurological: dizziness, loss of consciousness, numbness, vertigo  and weakness  Behavioral/Psych: anxiety and depression  Endocrine: diabetes, thyroid disorder      History  Past Medical History:   Diagnosis Date   • Cataracts, bilateral    • Cellulitis of left lower limb    • Cellulitis of right lower limb    • COPD (chronic obstructive pulmonary disease) ([facility])    • DJD (degenerative joint disease), cervical     DJD C Spine/Spondylolysis, Cervical Region   • Essential (primary) hypertension    • GERD (gastroesophageal reflux disease)    • Hematoma     Left mastectomy site   • Hypothyroidism    • Major depressive disorder, recurrent, moderate ([facility])    • Malignant neoplasm of left breast ([facility])    • Migraine    • Morbid (severe) obesity due to excess calories ([facility])    • Obstructive sleep apnea    • PONV (postoperative nausea and vomiting)    • Primary generalized (osteo)arthritis    • Pure hypercholesterolemia    • Tinea unguium    • Type 2 diabetes mellitus with hyperglycemia ([facility])    • Venous insufficiency (chronic) (peripheral)    ,   Past Surgical History:   Procedure Laterality Date   • BREAST ABSCESS INCISION AND DRAINAGE Left 12/21/2021    Procedure: EVACUATION OF LEFT MASTECTOMY HEMATOMA;  Surgeon: Naye Tavarez MD;  Location:  [facility] OR;  Service: General;  Laterality: Left;   • BREAST SURGERY Left     Left mastectomy   • CATARACT EXTRACTION, BILATERAL     • CATARACT EXTRACTION, BILATERAL     • ESSURE TUBAL LIGATION     • HYSTERECTOMY     • MASTECTOMY W/ SENTINEL NODE BIOPSY Left 11/30/2021    Procedure: LEFT MASTECTOMY WITH SENTINEL LYMPH NODE BIOPSY WITH MAGTRACE;  Surgeon: Naye Tavarez MD;  Location:  [facility] OR;  Service: General;  Laterality: Left;   • VARICOSE VEIN SURGERY Right    ,   Family History   Problem Relation Age of Onset   • Hypertension Sister    • Heart disease Mother    ,   Social History     Tobacco Use   • Smoking status: Current Every Day Smoker     Packs/day: 1.00     Types: Electronic Cigarette   • Smokeless tobacco: Never Used   Vaping  "Use   • Vaping Use: Every day   • Substances: Nicotine, patient states it has \"3%\" not sure if it is THC or CBD   • Devices: Disposable   • Passive vaping exposure: Yes   Substance Use Topics   • Alcohol use: No   • Drug use: No   ,   Medications Prior to Admission   Medication Sig Dispense Refill Last Dose   • acetaminophen (Tylenol) 325 MG tablet Take 3 tablets by mouth Every 8 (Eight) Hours. Take every 8 hours for 3 days then take prn as needed. 100 tablet 2    • docusate sodium (COLACE) 100 MG capsule Take 100 mg by mouth 2 (Two) Times a Day.      • levothyroxine (Euthyrox) 75 MCG tablet Take 75 mcg by mouth Daily.      • ondansetron (Zofran) 4 MG tablet Take 1 tablet by mouth Every 8 (Eight) Hours As Needed for Nausea. 15 tablet 0    • ondansetron (Zofran) 4 MG tablet Take 1 tablet by mouth Every 8 (Eight) Hours As Needed for Nausea. 15 tablet 0    • oxyCODONE (Roxicodone) 5 MG immediate release tablet Take 1 tablet by mouth Every 8 (Eight) Hours As Needed for Severe Pain . 5 tablet 0    • oxyCODONE-acetaminophen (PERCOCET)  MG per tablet Take 1 tablet by mouth Every 8 (Eight) Hours As Needed for Moderate Pain .      • triamterene-hydrochlorothiazide (MAXZIDE) 75-50 MG per tablet Take 1 tablet by mouth Daily.      • valsartan (DIOVAN) 80 MG tablet Take 80 mg by mouth 2 (Two) Times a Day.       and Allergies:  Augmentin [amoxicillin-pot clavulanate], Ceclor [cefaclor], Codeine, Darvon [propoxyphene], Fulvicin p-g [griseofulvin], Niacin, and Valium [diazepam]    Current Facility-Administered Medications:   •  acetaminophen (TYLENOL) tablet 975 mg, 975 mg, Oral, Q8H, Thang Aceves MD, 975 mg at 01/10/22 0604  •  docusate sodium (COLACE) capsule 100 mg, 100 mg, Oral, BID, Thang Aceves MD, 100 mg at 01/10/22 0822  •  famotidine (PEPCID) tablet 20 mg, 20 mg, Oral, BID AC, Thang Aceves MD, 20 mg at 01/10/22 0822  •  lactated ringers infusion, 50 mL/hr, Intravenous, Continuous, Thang Aceves MD, Last " Rate: 50 mL/hr at 01/09/22 2135, 50 mL/hr at 01/09/22 2135  •  levothyroxine (SYNTHROID, LEVOTHROID) tablet 75 mcg, 75 mcg, Oral, Q AM, Thang Aceves MD, 75 mcg at 01/10/22 0604  •  oxyCODONE-acetaminophen (PERCOCET) 5-325 MG per tablet 1 tablet, 1 tablet, Oral, Q4H PRN, Thang Aceves MD, 1 tablet at 01/09/22 1324  •  potassium chloride (KLOR-CON) packet 40 mEq, 40 mEq, Oral, BID, Thang Aceves MD, 40 mEq at 01/10/22 0822  •  sodium chloride 0.9 % flush 10 mL, 10 mL, Intravenous, Q12H, Thang Aceves MD, 10 mL at 01/10/22 0823  •  sodium chloride 0.9 % flush 10 mL, 10 mL, Intravenous, PRN, Thang Aceves MD    Objective     Vital Signs   Temp:  [97.6 °F (36.4 °C)-98.6 °F (37 °C)] 98.6 °F (37 °C)  Heart Rate:  [] 102  Resp:  [18-20] 18  BP: (115-125)/(63-92) 118/92    Physical Exam:  General appearance - well appearing, and in no distress  Mental status - alert but not oriented to date   Eyes - sclera anicteric  Neck - supple, no significant adenopathy  Chest - no tachypnea, retractions or cyanosis  Heart - normal rate and regular rhythm  Abdomen - soft, nontender, nondistended, no masses or organomegaly  Breast - left mastectomy incision well healed with no seroma/hematoma formation. AILYN Drain removed.     Results Review:    Lab Results (last 24 hours)     Procedure Component Value Units Date/Time    Basic Metabolic Panel [[id number]]  (Abnormal) Collected: 01/09/22 1134    Specimen: Blood Updated: 01/09/22 1157     Glucose 118 mg/dL      BUN 10 mg/dL      Creatinine 0.52 mg/dL      Sodium 135 mmol/L      Potassium 4.5 mmol/L      Comment: Slight hemolysis detected by analyzer. Results may be affected.        Chloride 98 mmol/L      CO2 28.0 mmol/L      Calcium 8.5 mg/dL      eGFR Non African Amer 114 mL/min/1.73      BUN/Creatinine Ratio 19.2     Anion Gap 9.0 mmol/L     Narrative:      GFR Normal >60  Chronic Kidney Disease <60  Kidney Failure <15      Blood Culture - Blood, Arm, Right [[id number]]   (Normal) Collected: 01/08/22 1045    Specimen: Blood from Arm, Right Updated: 01/09/22 1115     Blood Culture No growth at 24 hours    Blood Culture - Blood, Arm, Right [[id number]]  (Normal) Collected: 01/08/22 1040    Specimen: Blood from Arm, Right Updated: 01/09/22 1115     Blood Culture No growth at 24 hours        Imaging Results (Last 24 Hours)     ** No results found for the last 24 hours. **                Assessment/Plan       Ms. Dominguez is a 76 year old female s/p left mastectomy with sentinel lymph node biopsy and subsequent hematoma washout. AILYN Drain removed at bedside today. Follow up with me in 1 month after discharge.       Naye Tavarez MD  01/10/22  09:56 CST

## 2022-01-11 NOTE — PLAN OF CARE
Goal Outcome Evaluation:  Plan of Care Reviewed With: patient, daughter         Patient alert and oriented x2 to person and place, Confused, attempts to get out of bed multiple times. Safety precautions maintained. VSS. ROCHELLE BARRIENTOS. 1x assist. Uses toilet upon assistance. Poor intake. Encouraged to eat. Continue to monitor.

## 2022-01-11 NOTE — PLAN OF CARE
Goal Outcome Evaluation:  Plan of Care Reviewed With: patient        Progress: improving  Outcome Summary: PT tx completed. Pt fowlers position in bed, asking for help. Bed mobility CG/Art, sit<>stand CG, amb 40' x 2 with r wx CG. Moblizing well, but risk for falls due to cognitive status and decreased safety awareness. Benefit from 24/7 care or SNF.

## 2022-01-11 NOTE — THERAPY TREATMENT NOTE
Acute Care - Physical Therapy Treatment Note  [facility]     Patient Name: Alissa Dominguez  : 1945  MRN: [id number]  Today's Date: 2022      Visit Dx:     ICD-10-CM ICD-9-CM   1. Hyponatremia  E87.1 276.1   2. Closed head injury, initial encounter  S09.90XA 959.01   3. Fall, initial encounter  W19.XXXA E888.9   4. Altered mental status, unspecified altered mental status type  R41.82 780.97   5. Delirium  R41.0 780.09   6. Hypokalemia  E87.6 276.8   7. Dysphagia, unspecified type  R13.10 787.20   8. Impaired mobility  Z74.09 799.89   9. Impaired mobility and ADLs  Z74.09 V49.89    Z78.9      Patient Active Problem List   Diagnosis   • Venous insufficiency (chronic) (peripheral)   • Type 2 diabetes mellitus with hyperglycemia (HCC)   • Pure hypercholesterolemia   • Essential (primary) hypertension   • Preoperative evaluation to rule out surgical contraindication   • Abnormal EKG   • Shortness of breath   • 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, estrogen receptor positive ([facility])   • Breast mass   • Hematoma (nontraumatic) of breast   • Altered mental status   • Hyponatremia   • Hyponatremia   • Hypokalemia     Past Medical History:   Diagnosis Date   • Cataracts, bilateral    • Cellulitis of left lower limb    • Cellulitis of right lower limb    • COPD (chronic obstructive pulmonary disease) ([facility])    • DJD (degenerative joint disease), cervical     DJD C Spine/Spondylolysis, Cervical Region   • Essential (primary) hypertension    • GERD (gastroesophageal reflux disease)    • Hematoma     Left mastectomy site   • Hypothyroidism    • Major depressive disorder, recurrent, moderate ([facility])    • Malignant neoplasm of left breast ([facility])    • Migraine    • Morbid (severe) obesity due to excess calories ([facility])    • Obstructive sleep apnea    • PONV (postoperative nausea and vomiting)    • Primary generalized (osteo)arthritis    • Pure hypercholesterolemia    • Tinea unguium    • Type 2 diabetes  mellitus with hyperglycemia (HCC)    • Venous insufficiency (chronic) (peripheral)      Past Surgical History:   Procedure Laterality Date   • BREAST ABSCESS INCISION AND DRAINAGE Left 12/21/2021    Procedure: EVACUATION OF LEFT MASTECTOMY HEMATOMA;  Surgeon: Naye Tavarez MD;  Location:  [facility] OR;  Service: General;  Laterality: Left;   • BREAST SURGERY Left     Left mastectomy   • CATARACT EXTRACTION, BILATERAL     • CATARACT EXTRACTION, BILATERAL     • ESSURE TUBAL LIGATION     • HYSTERECTOMY     • MASTECTOMY W/ SENTINEL NODE BIOPSY Left 11/30/2021    Procedure: LEFT MASTECTOMY WITH SENTINEL LYMPH NODE BIOPSY WITH MAGTRACE;  Surgeon: Naye Tavarez MD;  Location:  [facility] OR;  Service: General;  Laterality: Left;   • VARICOSE VEIN SURGERY Right      PT Assessment (last 12 hours)     PT Evaluation and Treatment     Row Name 01/11/22 0930          Physical Therapy Time and Intention    Subjective Information no complaints  -KJ     Document Type therapy note (daily note)  -KJ     Mode of Treatment physical therapy  -KJ     Patient Effort good  -KJ     Row Name 01/11/22 0930          General Information    Existing Precautions/Restrictions fall  -KJ     Row Name 01/11/22 0930          Pain Scale: FACES Pre/Post-Treatment    Pain: FACES Scale, Pretreatment 4-->hurts little more  -KJ     Posttreatment Pain Rating 6-->hurts even more  -KJ     Pain Location neck  -KJ     Row Name 01/11/22 0930          Bed Mobility    Supine-Sit [place] (Bed Mobility) supervision  -KJ     Sit-Supine [place] (Bed Mobility) minimum assist (75% patient effort)  -KJ     Assistive Device (Bed Mobility) head of bed elevated  -KJ     Row Name 01/11/22 0930          Transfers    Sit-Stand [place] (Transfers) contact guard  -KJ     [place] Level (Toilet Transfer) verbal cues; contact guard  -KJ     Assistive Device (Toilet Transfer) walker, front-wheeled  -KJ     Row Name 01/11/22 0930          Sit-Stand Transfer     Assistive Device (Sit-Stand Transfers) walker, front-wheeled  -KJ     Row Name 01/11/22 0930          Toilet Transfer    Type (Toilet Transfer) sit-stand; stand-sit  -KJ     Row Name 01/11/22 0930          Gait/Stairs (Locomotion)    [place] Level (Gait) verbal cues; contact guard  -KJ     Assistive Device (Gait) walker, front-wheeled  -KJ     Distance in Feet (Gait) 40' x 2  -KJ     Pattern (Gait) step-through  -KJ     Deviations/Abnormal Patterns (Gait) stride length decreased; gait speed decreased; shane decreased  -KJ     Bilateral Gait Deviations forward flexed posture  -KJ     Row Name 01/11/22 0930          Aerobic Exercise    Comment, Aerobic Exercise (Therapeutic Exercise) AROM x 20 reps  -KJ     Row Name 01/11/22 0930          Positioning and Restraints    Pre-Treatment Position in bed  -KJ     Post Treatment Position bed  -KJ     In Bed call light within reach; exit alarm on  -KJ           User Key  (r) = Recorded By, (t) = Taken By, (c) = Cosigned By    Initials Name Provider Type    Jing Bautista, PTA Physical Therapy Assistant                Physical Therapy Education                 Title: PT OT SLP Therapies (In Progress)     Topic: Physical Therapy (In Progress)     Point: Mobility training (In Progress)     Learning Progress Summary           Patient Acceptance, E, NR by MS at 1/10/2022 1142    Comment: role of PT in her care                   Point: Home exercise program (Not Started)     Learner Progress:  Not documented in this visit.          Point: Body mechanics (Not Started)     Learner Progress:  Not documented in this visit.          Point: Precautions (Not Started)     Learner Progress:  Not documented in this visit.                      User Key     Initials Effective Dates Name Provider Type Discipline    MS 06/19/18 -  Debora Villegas, PT, DPT, NCS Physical Therapist PT              PT Recommendation and Plan     Plan of Care Reviewed With: patient  Progress:  improving  Outcome Summary: PT tx completed. Pt fowlers position in bed, asking for help. Bed mobility CG/Art, sit<>stand CG, amb 40' x 2 with r wx CG. Moblizing well, but risk for falls due to cognitive status and decreased safety awareness. Benefit from 24/7 care or SNF.       Time Calculation:    PT Charges     Row Name 01/11/22 1012             Time Calculation    Start Time 0930  -KJ      Stop Time 1010  -KJ      Time Calculation (min) 40 min  -KJ      PT Received On 01/11/22  -KJ      PT Goal Re-Cert Due Date 01/20/22  -KJ            User Key  (r) = Recorded By, (t) = Taken By, (c) = Cosigned By    Initials Name Provider Type    Jing Bautista, DADA Physical Therapy Assistant              Therapy Charges for Today     Code Description Service Date Service Provider Modifiers Qty    05068147814 HC GAIT TRAINING EA 15 MIN 1/11/2022 Jing Demarco, DADA GP 2    12176755598 HC PT THER PROC EA 15 MIN 1/11/2022 Jing Demarco, DADA GP 1          PT G-Codes  Outcome Measure Options: AM-PAC 6 Clicks Daily Activity (OT)  AM-PAC 6 Clicks Score (PT): 16  AM-PAC 6 Clicks Score (OT): 18    Jing Demarco PTA  1/11/2022

## 2022-01-11 NOTE — PROGRESS NOTES
[facility] Medicine Services  INPATIENT PROGRESS NOTE    Patient Name: Alissa Dominguez  Date of Admission: 1/8/2022  Today's Date: 01/11/22  Length of Stay: 3  Primary Care Physician: Pb Mitchell APRN    Subjective   Chief Complaint: Follow-up altered mental status/hyponatremia    HPI   Patient is resting comfortably in bed.  He breakfast and lunch without issue.  No nausea or vomiting.  Afebrile.  Worked with therapies.  Pain stable.    Review of Systems   All pertinent negatives and positives are as above. All other systems have been reviewed and are negative unless otherwise stated.     Objective    Temp:  [97.3 °F (36.3 °C)-98.3 °F (36.8 °C)] 98.3 °F (36.8 °C)  Heart Rate:  [] 97  Resp:  [18] 18  BP: (119-153)/() 153/109  Physical Exam  GEN: Awake, alert, interactive, in NAD  HEENT:  PERRLA, EOMI, Anicteric, Trachea midline  Lungs: no wheezing/rales/rhonchi  Heart: RRR, +S1/s2, no rub  ABD: soft, nt/nd, +BS, no guarding/rebound  Extremities:  no cyanosis, no edema  Skin: no petechiae  Neuro: BARRIENTOS, no focal deficits  Psych: normal mood & affect        Results Review:  I have reviewed the labs, radiology results, and diagnostic studies.    Laboratory Data:   Results from last 7 days   Lab Units 01/10/22  1128 01/09/22  0539 01/08/22  1045   WBC 10*3/mm3 7.24 6.63 6.95   HEMOGLOBIN g/dL 13.5 13.2 13.4   HEMATOCRIT % 42.9 43.5 42.9   PLATELETS 10*3/mm3 300 284 298        Results from last 7 days   Lab Units 01/11/22  0845 01/10/22  1128 01/09/22  1134 01/08/22 2017 01/08/22  1045   SODIUM mmol/L 131* 132* 135*   < > 129*   POTASSIUM mmol/L 4.2 5.3* 4.5   < > 2.9*   CHLORIDE mmol/L 98 96* 98   < > 89*   CO2 mmol/L 27.0 28.0 28.0   < > 29.0   BUN mg/dL 6* 8 10   < > 12   CREATININE mg/dL 0.53* 0.64 0.52*   < > 0.61   CALCIUM mg/dL 8.7 9.0 8.5*   < > 9.2   BILIRUBIN mg/dL  --   --   --   --  0.8   ALK PHOS U/L  --   --   --   --  57   ALT (SGPT) U/L  --   --   --    --  13   AST (SGOT) U/L  --   --   --   --  36*   GLUCOSE mg/dL 112* 109* 118*   < > 140*    < > = values in this interval not displayed.       Culture Data:   Blood Culture   Date Value Ref Range Status   01/08/2022 No growth at 2 days  Preliminary   01/08/2022 No growth at 2 days  Preliminary       Radiology Data:   Imaging Results (Last 24 Hours)     ** No results found for the last 24 hours. **          I have reviewed the patient's current medications.     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  I think there was some concern on arrival of potential medication side effect from pain meds versus hyponatremia.  Pain meds have been weaned down.  No obvious signs of active infection.  Afebrile.    #2 hyponatremia -improved with IV fluids from 127 up to 135 then trended back down.  Only 131 this morning.  Been off fluids since yesterday.  Looking back it seems she has had some degree of hyponatremia since November.  Given her cancer history SIADH could be in the differential.  We will check a urine osmole and sodium.  Do note that she had a large amount of fluids in her room yesterday and she states she is eating well today.  We will fluid restrict for now and monitor.  Would not recommend Maxide after discharge.        #3 essential hypertension -blood pressure currently doing fine off her home meds, monitor    #4 breast neoplasm -that is post mastectomy and lymph node biopsy.  AILYN drain removed by surgery on 1/10.  Recommend outpatient follow-up in a month.    #5 hypokalemia -improved, continue to monitor      Discharge Planning: Plan for SNF at discharge.  CM working on placement.  Bed availability likely on 1/13 per  report.    Electronically signed by Kenn Moore DO, 01/11/22, 12:02 CST.

## 2022-01-12 NOTE — THERAPY TREATMENT NOTE
Acute Care - Occupational Therapy Treatment Note  [facility]     Patient Name: Alissa Dominguez  : 1945  MRN: [id number]  Today's Date: 2022             Admit Date: 2022       ICD-10-CM ICD-9-CM   1. Hyponatremia  E87.1 276.1   2. Closed head injury, initial encounter  S09.90XA 959.01   3. Fall, initial encounter  W19.XXXA E888.9   4. Altered mental status, unspecified altered mental status type  R41.82 780.97   5. Delirium  R41.0 780.09   6. Hypokalemia  E87.6 276.8   7. Dysphagia, unspecified type  R13.10 787.20   8. Impaired mobility  Z74.09 799.89   9. Impaired mobility and ADLs  Z74.09 V49.89    Z78.9      Patient Active Problem List   Diagnosis   • Venous insufficiency (chronic) (peripheral)   • Type 2 diabetes mellitus with hyperglycemia (HCC)   • Pure hypercholesterolemia   • Essential (primary) hypertension   • Preoperative evaluation to rule out surgical contraindication   • Abnormal EKG   • Shortness of breath   • 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, estrogen receptor positive ([facility])   • Breast mass   • Hematoma (nontraumatic) of breast   • Altered mental status   • Hyponatremia   • Hyponatremia   • Hypokalemia     Past Medical History:   Diagnosis Date   • Cataracts, bilateral    • Cellulitis of left lower limb    • Cellulitis of right lower limb    • COPD (chronic obstructive pulmonary disease) ([facility])    • DJD (degenerative joint disease), cervical     DJD C Spine/Spondylolysis, Cervical Region   • Essential (primary) hypertension    • GERD (gastroesophageal reflux disease)    • Hematoma     Left mastectomy site   • Hypothyroidism    • Major depressive disorder, recurrent, moderate ([facility])    • Malignant neoplasm of left breast ([facility])    • Migraine    • Morbid (severe) obesity due to excess calories ([facility])    • Obstructive sleep apnea    • PONV (postoperative nausea and vomiting)    • Primary generalized (osteo)arthritis    • Pure hypercholesterolemia    • Tinea  unguium    • Type 2 diabetes mellitus with hyperglycemia (HCC)    • Venous insufficiency (chronic) (peripheral)      Past Surgical History:   Procedure Laterality Date   • BREAST ABSCESS INCISION AND DRAINAGE Left 12/21/2021    Procedure: EVACUATION OF LEFT MASTECTOMY HEMATOMA;  Surgeon: Naye Tavarez MD;  Location:  [facility] OR;  Service: General;  Laterality: Left;   • BREAST SURGERY Left     Left mastectomy   • CATARACT EXTRACTION, BILATERAL     • CATARACT EXTRACTION, BILATERAL     • ESSURE TUBAL LIGATION     • HYSTERECTOMY     • MASTECTOMY W/ SENTINEL NODE BIOPSY Left 11/30/2021    Procedure: LEFT MASTECTOMY WITH SENTINEL LYMPH NODE BIOPSY WITH MAGTRACE;  Surgeon: Naye Tavarez MD;  Location:  [facility];  Service: General;  Laterality: Left;   • VARICOSE VEIN SURGERY Right          OT ASSESSMENT FLOWSHEET (last 12 hours)     OT Evaluation and Treatment     Row Name 01/12/22 0804                   OT Time and Intention    Document Type therapy note (daily note)  -              User Key  (r) = Recorded By, (t) = Taken By, (c) = Cosigned By    Initials Name Effective Dates    TS Val Castro, BAINS 06/16/21 -                  Occupational Therapy Education                 Title: PT OT SLP Therapies (In Progress)     Topic: Occupational Therapy (In Progress)     Point: ADL training (In Progress)     Description:   Instruct learner(s) on proper safety adaptation and remediation techniques during self care or transfers.   Instruct in proper use of assistive devices.              Learning Progress Summary           Patient Acceptance, E, NR by MM at 1/10/2022 5950    Comment: OT role, benefits, POC, d/c planning, need for assist, RW safety                   Point: Home exercise program (Not Started)     Description:   Instruct learner(s) on appropriate technique for monitoring, assisting and/or progressing therapeutic exercises/activities.              Learner Progress:  Not documented in this visit.           Point: Precautions (In Progress)     Description:   Instruct learner(s) on prescribed precautions during self-care and functional transfers.              Learning Progress Summary           Patient Acceptance, E, NR by MM at 1/10/2022 1708    Comment: OT role, benefits, POC, d/c planning, need for assist, RW safety                   Point: Body mechanics (In Progress)     Description:   Instruct learner(s) on proper positioning and spine alignment during self-care, functional mobility activities and/or exercises.              Learning Progress Summary           Patient Acceptance, E, NR by MM at 1/10/2022 1708    Comment: OT role, benefits, POC, d/c planning, need for assist, RW safety                               User Key     Initials Effective Dates Name Provider Type Discipline     06/16/21 -  Jama Ruelas, OTR/L Occupational Therapist OT                  OT Recommendation and Plan     Plan of Care Review  Plan of Care Reviewed With: patient  Progress: improving  Outcome Summary: Pt mildly confused, but follows all instructions given. Pt ambulates/transfers with RW and SBA. Pt SBA/S for ADLs. Pt would benefit from continued rehab at discharge. Continue OT POC  Plan of Care Reviewed With: patient  Outcome Summary: Pt mildly confused, but follows all instructions given. Pt ambulates/transfers with RW and SBA. Pt SBA/S for ADLs. Pt would benefit from continued rehab at discharge. Continue OT POC        Time Calculation:    Time Calculation- OT     Row Name 01/12/22 1457             Time Calculation- OT    OT Start Time 0800  -TS      OT Stop Time 0854  -TS      OT Time Calculation (min) 54 min  -TS      Total Timed Code Minutes- OT 54 minute(s)  -TS      OT Received On 01/12/22  -TS              Timed Charges    78500 - OT Self Care/Mgmt Minutes 54  -TS              Total Minutes    Timed Charges Total Minutes 54  -TS       Total Minutes 54  -TS            User Key  (r) = Recorded By, (t) = Taken  By, (c) = Cosigned By    Initials Name Provider Type    TS Val Castro COTA Occupational Therapy Assistant              Therapy Charges for Today     Code Description Service Date Service Provider Modifiers Qty    27463613020 HC OT SELF CARE/MGMT/TRAIN EA 15 MIN 1/12/2022 Val Castro COTA GO 4               Val BOWDEN. NARA Castro  1/12/2022

## 2022-01-12 NOTE — PLAN OF CARE
Goal Outcome Evaluation:  Plan of Care Reviewed With: patient        Progress: improving  Outcome Summary: Pt mildly confused, but follows all instructions given. Pt ambulates/transfers with RW and SBA. Pt SBA/S for ADLs. Pt would benefit from continued rehab at discharge. Continue OT POC

## 2022-01-12 NOTE — PAYOR COMM NOTE
"1/11 CLINICAL UPDATE  UR  [phone]  [id number]    Karly Dominguez (76 y.o. Female)             Date of Birth Social Security Number Address Home Phone MRN    1945  [address] [place] [address] [address] [place] [id number] [phone] [id number]    Yarsani Marital Status             Yarsanism        Admission Date Admission Type Admitting Provider Attending Provider Department, Room/Bed    1/8/22 Emergency Kenn Moore DO Demeo, Michael F, DO [facility] 3A, 349/1    Discharge Date Discharge Disposition Discharge Destination                         Attending Provider: Kenn Moore DO    Allergies: Augmentin [Amoxicillin-pot Clavulanate], Ceclor [Cefaclor], Codeine, Darvon [Propoxyphene], Fulvicin P-g [Griseofulvin], Niacin, Valium [Diazepam]    Isolation: None   Infection: None   Code Status: No CPR   Advance Care Planning Activity    Ht: 157.5 cm (62\")   Wt: 89.4 kg (197 lb)    Admission Cmt: None   Principal Problem: Altered mental status [R41.82]                 Active Insurance as of 1/8/2022     Primary Coverage     Payor Plan Insurance Group Employer/Plan Group    ANTHEM MEDICARE REPLACEMENT ANTHEM MEDICARE ADVANTAGE [id number]     Payor Plan Address Payor Plan Phone Number Payor Plan Fax Number Effective Dates    [address] [phone]  1/1/2015 - None Entered    [facility] [id number]       Subscriber Name Subscriber Birth Date Member ID       KARLY DOMINGUEZ 1945 [id number]                 Emergency Contacts      (Rel.) Home Phone Work Phone Mobile Phone    Kassidy Lind (Daughter) [phone] -- [phone]              Current Facility-Administered Medications   Medication Dose Route Frequency Provider Last Rate Last Admin   • acetaminophen (TYLENOL) tablet 650 mg  650 mg Oral Q8H PRN Kenn Moore DO       • docusate sodium (COLACE) capsule 100 mg  100 mg Oral BID Thang Aceves MD   100 mg at 01/11/22 2049   • famotidine (PEPCID) " tablet 20 mg  20 mg Oral BID AC Thang Aceves MD   20 mg at 01/11/22 1642   • levothyroxine (SYNTHROID, LEVOTHROID) tablet 75 mcg  75 mcg Oral Q AM Thang Aceves MD   75 mcg at 01/12/22 0558   • oxyCODONE-acetaminophen (PERCOCET) 5-325 MG per tablet 1 tablet  1 tablet Oral Q4H PRN Thang Aceves MD   1 tablet at 01/12/22 0411   • sodium chloride 0.9 % flush 10 mL  10 mL Intravenous Q12H Thang Aceves MD   10 mL at 01/10/22 0823   • sodium chloride 0.9 % flush 10 mL  10 mL Intravenous PRN Thang Aceves MD         Orders (last 24 hrs)      Start     Ordered    01/12/22 0727  Extra Tubes  Once         01/12/22 0727    01/12/22 0727  Lavender Top  PROCEDURE ONCE         01/12/22 0727    01/11/22 1533  Osmolality, Urine - Urine, Clean Catch  Once         01/11/22 1532    01/11/22 1533  Sodium, Urine, Random - Urine, Clean Catch  Once         01/11/22 1532    01/11/22 1532  Diet Soft Texture; Ground; Daily Fluid Restriction; 1500 mL Fluid Per Day  Diet Effective Now         01/11/22 1531    01/11/22 1530  acetaminophen (TYLENOL) tablet 650 mg  Every 8 Hours PRN         01/11/22 1516    01/10/22 0600  Basic Metabolic Panel  Daily       01/09/22 1250    01/09/22 1248  oxyCODONE-acetaminophen (PERCOCET) 5-325 MG per tablet 1 tablet  Every 4 Hours PRN         01/09/22 1248    01/09/22 0600  levothyroxine (SYNTHROID, LEVOTHROID) tablet 75 mcg  Every Early Morning         01/08/22 1459    01/08/22 2100  docusate sodium (COLACE) capsule 100 mg  2 Times Daily         01/08/22 1459    01/08/22 2100  sodium chloride 0.9 % flush 10 mL  Every 12 Hours Scheduled         01/08/22 1459    01/08/22 1800  Incentive Spirometry  Every 4 Hours While Awake       01/08/22 1459    01/08/22 1730  famotidine (PEPCID) tablet 20 mg  2 Times Daily Before Meals         01/08/22 1459    01/08/22 1600  Vital Signs  Every 4 Hours       01/08/22 1459    01/08/22 1501  acetaminophen (TYLENOL) tablet 975 mg  Every 8 Hours,   Status:  Discontinued          01/08/22 1459    01/08/22 1500  Intake & Output  Every Shift       01/08/22 1459    01/08/22 1500  Daily Weights  Daily       01/08/22 1459    01/08/22 1500  Strict Intake & Output  Every Hour       01/08/22 1459    01/08/22 1459  sodium chloride 0.9 % flush 10 mL  As Needed         01/08/22 1459    --  SCANNED - TELEMETRY           01/08/22 0000    --  SCANNED - TELEMETRY           01/08/22 0000    --  SCANNED EKG         01/08/22 0000                   Physician Progress Notes (last 24 hours)      Kenn Moore, DO at 01/11/22 1202              [facility] Medicine Services  INPATIENT PROGRESS NOTE    Patient Name: Alissa Dominguez  Date of Admission: 1/8/2022  Today's Date: 01/11/22  Length of Stay: 3  Primary Care Physician: Pb Mitchell APRN    Subjective   Chief Complaint: Follow-up altered mental status/hyponatremia    HPI   Patient is resting comfortably in bed.  He breakfast and lunch without issue.  No nausea or vomiting.  Afebrile.  Worked with therapies.  Pain stable.    Review of Systems   All pertinent negatives and positives are as above. All other systems have been reviewed and are negative unless otherwise stated.     Objective    Temp:  [97.3 °F (36.3 °C)-98.3 °F (36.8 °C)] 98.3 °F (36.8 °C)  Heart Rate:  [] 97  Resp:  [18] 18  BP: (119-153)/() 153/109  Physical Exam  GEN: Awake, alert, interactive, in NAD  HEENT:  PERRLA, EOMI, Anicteric, Trachea midline  Lungs: no wheezing/rales/rhonchi  Heart: RRR, +S1/s2, no rub  ABD: soft, nt/nd, +BS, no guarding/rebound  Extremities:  no cyanosis, no edema  Skin: no petechiae  Neuro: BARRIENTOS, no focal deficits  Psych: normal mood & affect        Results Review:  I have reviewed the labs, radiology results, and diagnostic studies.    Laboratory Data:   Results from last 7 days   Lab Units 01/10/22  1128 01/09/22  0539 01/08/22  1045   WBC 10*3/mm3 7.24 6.63 6.95   HEMOGLOBIN g/dL 13.5 13.2 13.4   HEMATOCRIT  % 42.9 43.5 42.9   PLATELETS 10*3/mm3 300 284 298        Results from last 7 days   Lab Units 01/11/22  0845 01/10/22  1128 01/09/22  1134 01/08/22 2017 01/08/22  1045   SODIUM mmol/L 131* 132* 135*   < > 129*   POTASSIUM mmol/L 4.2 5.3* 4.5   < > 2.9*   CHLORIDE mmol/L 98 96* 98   < > 89*   CO2 mmol/L 27.0 28.0 28.0   < > 29.0   BUN mg/dL 6* 8 10   < > 12   CREATININE mg/dL 0.53* 0.64 0.52*   < > 0.61   CALCIUM mg/dL 8.7 9.0 8.5*   < > 9.2   BILIRUBIN mg/dL  --   --   --   --  0.8   ALK PHOS U/L  --   --   --   --  57   ALT (SGPT) U/L  --   --   --   --  13   AST (SGOT) U/L  --   --   --   --  36*   GLUCOSE mg/dL 112* 109* 118*   < > 140*    < > = values in this interval not displayed.       Culture Data:   Blood Culture   Date Value Ref Range Status   01/08/2022 No growth at 2 days  Preliminary   01/08/2022 No growth at 2 days  Preliminary       Radiology Data:   Imaging Results (Last 24 Hours)     ** No results found for the last 24 hours. **          I have reviewed the patient's current medications.     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  I think there was some concern on arrival of potential medication side effect from pain meds versus hyponatremia.  Pain meds have been weaned down.  No obvious signs of active infection.  Afebrile.    #2 hyponatremia -improved with IV fluids from 127 up to 135 then trended back down.  Only 131 this morning.  Been off fluids since yesterday.  Looking back it seems she has had some degree of hyponatremia since November.  Given her cancer history SIADH could be in the differential.  We will check a urine osmole and sodium.  Do note that she had a large amount of fluids in her room yesterday and she  states she is eating well today.  We will fluid restrict for now and monitor.  Would not recommend Maxide after discharge.        #3 essential hypertension -blood pressure currently doing fine off her home meds, monitor    #4 breast neoplasm -that is post mastectomy and lymph node biopsy.  AILYN drain removed by surgery on 1/10.  Recommend outpatient follow-up in a month.    #5 hypokalemia -improved, continue to monitor      Discharge Planning: Plan for SNF at discharge.  CM working on placement.  Bed availability likely on 1/13 per report.    Electronically signed by Kenn Moore DO, 01/11/22, 12:02 CST.      Electronically signed by Kenn Moore DO at 01/11/22 5217

## 2022-01-12 NOTE — PLAN OF CARE
Goal Outcome Evaluation:  Plan of Care Reviewed With: patient           Outcome Summary: Patient is alert, disoriented to time and situation, complaints of pain to back medicated times 2 on this shift with some relief of pain, up  to BR with assist of one and walker, dressing to old AILYN drain site CDI , bed alarm in use, BARRIENTOS, VSS, Tele, safety maintained, will continue to monitor.

## 2022-01-12 NOTE — THERAPY TREATMENT NOTE
Acute Care - Physical Therapy Treatment Note  [facility]     Patient Name: Alissa Dominguez  : 1945  MRN: [id number]  Today's Date: 2022      Visit Dx:     ICD-10-CM ICD-9-CM   1. Hyponatremia  E87.1 276.1   2. Closed head injury, initial encounter  S09.90XA 959.01   3. Fall, initial encounter  W19.XXXA E888.9   4. Altered mental status, unspecified altered mental status type  R41.82 780.97   5. Delirium  R41.0 780.09   6. Hypokalemia  E87.6 276.8   7. Dysphagia, unspecified type  R13.10 787.20   8. Impaired mobility  Z74.09 799.89   9. Impaired mobility and ADLs  Z74.09 V49.89    Z78.9      Patient Active Problem List   Diagnosis   • Venous insufficiency (chronic) (peripheral)   • Type 2 diabetes mellitus with hyperglycemia (HCC)   • Pure hypercholesterolemia   • Essential (primary) hypertension   • Preoperative evaluation to rule out surgical contraindication   • Abnormal EKG   • Shortness of breath   • 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, estrogen receptor positive ([facility])   • Breast mass   • Hematoma (nontraumatic) of breast   • Altered mental status   • Hyponatremia   • Hyponatremia   • Hypokalemia     Past Medical History:   Diagnosis Date   • Cataracts, bilateral    • Cellulitis of left lower limb    • Cellulitis of right lower limb    • COPD (chronic obstructive pulmonary disease) ([facility])    • DJD (degenerative joint disease), cervical     DJD C Spine/Spondylolysis, Cervical Region   • Essential (primary) hypertension    • GERD (gastroesophageal reflux disease)    • Hematoma     Left mastectomy site   • Hypothyroidism    • Major depressive disorder, recurrent, moderate ([facility])    • Malignant neoplasm of left breast ([facility])    • Migraine    • Morbid (severe) obesity due to excess calories ([facility])    • Obstructive sleep apnea    • PONV (postoperative nausea and vomiting)    • Primary generalized (osteo)arthritis    • Pure hypercholesterolemia    • Tinea unguium    • Type 2 diabetes  mellitus with hyperglycemia (HCC)    • Venous insufficiency (chronic) (peripheral)      Past Surgical History:   Procedure Laterality Date   • BREAST ABSCESS INCISION AND DRAINAGE Left 12/21/2021    Procedure: EVACUATION OF LEFT MASTECTOMY HEMATOMA;  Surgeon: Naye Tavarez MD;  Location:  [facility] OR;  Service: General;  Laterality: Left;   • BREAST SURGERY Left     Left mastectomy   • CATARACT EXTRACTION, BILATERAL     • CATARACT EXTRACTION, BILATERAL     • ESSURE TUBAL LIGATION     • HYSTERECTOMY     • MASTECTOMY W/ SENTINEL NODE BIOPSY Left 11/30/2021    Procedure: LEFT MASTECTOMY WITH SENTINEL LYMPH NODE BIOPSY WITH MAGTRACE;  Surgeon: Naye Tavarez MD;  Location:  [facility] OR;  Service: General;  Laterality: Left;   • VARICOSE VEIN SURGERY Right      PT Assessment (last 12 hours)     PT Evaluation and Treatment     Row Name 01/12/22 08          Physical Therapy Time and Intention    Subjective Information no complaints  -KJ     Mode of Treatment physical therapy  -KJ     Patient Effort good  -KJ     Row Name 01/12/22 0804          General Information    Existing Precautions/Restrictions fall  -KJ     Barriers to Rehab cognitive status  -KJ     Row Name 01/12/22 0804          Pain Scale: Numbers Pre/Post-Treatment    Pretreatment Pain Rating 0/10 - no pain  -KJ     Posttreatment Pain Rating 0/10 - no pain  -KJ     Row Name 01/12/22 0804          Bed Mobility    Comment (Bed Mobility) up in chair  -KJ     Row Name 01/12/22 0804          Transfers    Sit-Stand [place] (Transfers) verbal cues; contact guard  -KJ     Row Name 01/12/22 0804          Sit-Stand Transfer    Assistive Device (Sit-Stand Transfers) walker, front-wheeled  -KJ     Row Name 01/12/22 0804          Gait/Stairs (Locomotion)    [place] Level (Gait) contact guard; verbal cues  -KJ     Assistive Device (Gait) walker, front-wheeled  -KJ     Distance in Feet (Gait) 40' x 2  -KJ     Deviations/Abnormal Patterns (Gait) gait speed  decreased; stride length decreased  -KJ     Bilateral Gait Deviations forward flexed posture  -KJ     Row Name 01/12/22 0804          Aerobic Exercise    Comment, Aerobic Exercise (Therapeutic Exercise) AROM x 20  -KJ     Row Name 01/12/22 0804          Positioning and Restraints    Pre-Treatment Position sitting in chair/recliner  -KJ     Post Treatment Position chair  -KJ     In Bed call light within reach  -KJ           User Key  (r) = Recorded By, (t) = Taken By, (c) = Cosigned By    Initials Name Provider Type    Jing Bautista, PTA Physical Therapy Assistant                Physical Therapy Education                 Title: PT OT SLP Therapies (In Progress)     Topic: Physical Therapy (In Progress)     Point: Mobility training (In Progress)     Learning Progress Summary           Patient Acceptance, E, NR by MS at 1/10/2022 1142    Comment: role of PT in her care                   Point: Home exercise program (Not Started)     Learner Progress:  Not documented in this visit.          Point: Body mechanics (Not Started)     Learner Progress:  Not documented in this visit.          Point: Precautions (Not Started)     Learner Progress:  Not documented in this visit.                      User Key     Initials Effective Dates Name Provider Type Discipline    MS 06/19/18 -  Debora Villegas, PT, DPT, NCS Physical Therapist PT              PT Recommendation and Plan     Plan of Care Reviewed With: patient  Progress: improving  Outcome Summary: PT tx completed. Pt sitting in chair, c/o neck. Transfers CG/Art, amb 40' x 2 with r wx CG. Decreased tolerance to activity. Benefit from strengthening.       Time Calculation:    PT Charges     Row Name 01/12/22 1148             Time Calculation    Start Time 1126  -KJ      Stop Time 1138  -KJ      Time Calculation (min) 12 min  -KJ      PT Received On 01/12/22  -KJ      PT Goal Re-Cert Due Date 01/20/22  -KJ              Time Calculation- PT    Total Timed Code Minutes-  PT 12 minute(s)  -LAURIE            User Key  (r) = Recorded By, (t) = Taken By, (c) = Cosigned By    Initials Name Provider Type    Jing Bautista, DADA Physical Therapy Assistant              Therapy Charges for Today     Code Description Service Date Service Provider Modifiers Qty    16894462964 HC GAIT TRAINING EA 15 MIN 1/11/2022 Jing Demarco, DADA GP 2    69722929312 HC PT THER PROC EA 15 MIN 1/11/2022 Jing Demarco, DADA GP 1    46644922626 HC GAIT TRAINING EA 15 MIN 1/12/2022 Jing eDmarco, DADA GP 1          PT G-Codes  Outcome Measure Options: AM-PAC 6 Clicks Daily Activity (OT)  AM-PAC 6 Clicks Score (PT): 16  AM-PAC 6 Clicks Score (OT): 18    Jing Demarco PTA  1/12/2022

## 2022-01-12 NOTE — PROGRESS NOTES
[facility] Medicine Services  INPATIENT PROGRESS NOTE    Patient Name: Alissa Dominguez  Date of Admission: 1/8/2022  Today's Date: 01/12/22  Length of Stay: 4  Primary Care Physician: Pb Mitchell APRN    Subjective   Chief Complaint: Follow-up altered mental status/hyponatremia    HPI:  Patient resting in bed comfortably.  She reports that she feels well.  States left chest site where the drain was removed is still a little bit sore but no significant pain or discomfort.  Tolerating p.o. without issue.  Positive BM.    Review of Systems   All pertinent negatives and positives are as above. All other systems have been reviewed and are negative unless otherwise stated.     Objective    Temp:  [97.9 °F (36.6 °C)-98.5 °F (36.9 °C)] 98 °F (36.7 °C)  Heart Rate:  [] 96  Resp:  [16-18] 16  BP: (131-153)/() 132/99  Physical Exam  GEN: Awake, alert, interactive, in NAD  HEENT:  PERRLA, EOMI, Anicteric, Trachea midline  Lungs: no wheezing/rales/rhonchi  Heart: RRR, +S1/s2, no rub  ABD: soft, nt/nd, +BS, no guarding/rebound  Extremities:  no cyanosis, no edema  Skin: no petechiae  Neuro: AAO x2-3, BARRIENTOS, no focal deficits  Psych: normal mood & affect        Results Review:  I have reviewed the labs, radiology results, and diagnostic studies.    Laboratory Data:   Results from last 7 days   Lab Units 01/10/22  1128 01/09/22  0539 01/08/22  1045   WBC 10*3/mm3 7.24 6.63 6.95   HEMOGLOBIN g/dL 13.5 13.2 13.4   HEMATOCRIT % 42.9 43.5 42.9   PLATELETS 10*3/mm3 300 284 298        Results from last 7 days   Lab Units 01/12/22  0508 01/11/22  0845 01/10/22  1128 01/08/22 2017 01/08/22  1045   SODIUM mmol/L 134* 131* 132*   < > 129*   POTASSIUM mmol/L 4.3 4.2 5.3*   < > 2.9*   CHLORIDE mmol/L 98 98 96*   < > 89*   CO2 mmol/L 29.0 27.0 28.0   < > 29.0   BUN mg/dL 4* 6* 8   < > 12   CREATININE mg/dL 0.56* 0.53* 0.64   < > 0.61   CALCIUM mg/dL 8.6 8.7 9.0   < > 9.2   BILIRUBIN mg/dL  --    --   --   --  0.8   ALK PHOS U/L  --   --   --   --  57   ALT (SGPT) U/L  --   --   --   --  13   AST (SGOT) U/L  --   --   --   --  36*   GLUCOSE mg/dL 97 112* 109*   < > 140*    < > = values in this interval not displayed.       Culture Data:   Blood Culture   Date Value Ref Range Status   01/08/2022 No growth at 2 days  Preliminary   01/08/2022 No growth at 2 days  Preliminary       Radiology Data:   Imaging Results (Last 24 Hours)     ** No results found for the last 24 hours. **          I have reviewed the patient's current medications.     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  I think there was some concern on arrival of potential medication side effect from pain meds versus hyponatremia.  Pain meds have been weaned down.  No obvious signs of active infection.  Afebrile.    #2 hyponatremia -she has had some chronic level of hyponatremia since November.  Doing better today and back up to 134.  Continue fluid restriction. Again as mentioned prior would not use Maxide at discharge given HCTZ component.    #3 essential hypertension -blood pressure currently doing fine off her home meds, monitor    #4 breast neoplasm -that is post mastectomy and lymph node biopsy.  AILYN drain removed by surgery on 1/10.  Recommend outpatient follow-up in a month.    #5 hypokalemia -improved      Discharge Planning: Patient is doing well.  Plan for DC to SNF tomorrow.    Electronically signed by Kenn Moore DO, 01/12/22, 10:49 CST.

## 2022-01-12 NOTE — PLAN OF CARE
Goal Outcome Evaluation:  Plan of Care Reviewed With: patient        Progress: improving  Outcome Summary: PT tx completed. Pt sitting in chair, c/o neck. Transfers CG/Art, amb 40' x 2 with r wx CG. Decreased tolerance to activity. Benefit from strengthening.

## 2022-01-13 NOTE — CASE MANAGEMENT/SOCIAL WORK
Continued Stay Note   [facility]     Patient Name: Alissa Dominguez  MRN: [id number]  Today's Date: 1/13/2022    Admit Date: 1/8/2022     Discharge Plan     Row Name 01/13/22 0847       Plan    Plan [facility]- upon ins. precert completion(hopeful today)    Patient/Family in Agreement with Plan yes    Plan Comments Pt should be able to d/c to [facility] today upon ins. precert approval. Will follow. [phone]               Discharge Codes    No documentation.                     JERMAINE Wyman

## 2022-01-13 NOTE — CASE MANAGEMENT/SOCIAL WORK
Continued Stay Note  [facility]     Patient Name: Alissa Dominguez  MRN: [id number]  Today's Date: 1/13/2022    Admit Date: 1/8/2022     Discharge Plan     Row Name 01/13/22 1009       Plan    Plan [facility]    Patient/Family in Agreement with Plan yes    Final Discharge Disposition Code 03 - skilled nursing facility (SNF)    Final Note Saida called from [facility] and ins. has approved pt to be admitted there today.  Pt will be admitted at SNF level care.  Informed pt's dtr-Kassidy Lind [phone] and with pt's confusion she prefers pt travel via [facility] ambulance [phone].    [facility]  [phone]  Fax: [phone]  Saida’s Cell [phone]      Row Name 01/13/22 0776       Plan    Plan [facility]- upon ins. precert completed (hopeful today)    Patient/Family in Agreement with Plan yes    Plan Comments Pt should be able to d/c to [facility] today upon ins. precert approval. Will follow. [phone]               Discharge Codes    No documentation.                     JERMAINE Wyman

## 2022-01-13 NOTE — PLAN OF CARE
Goal Outcome Evaluation:           Progress: improving  Outcome Summary: A&Ox3, disoriented to situation. Forgetful. Pleasant. Frequent reorientation provided. C/o back pain relieved with prn po. Up x 1 with walker to BR. 1500ml fluid restriction maintained. L dressing intact. Soft ground diet, tolerating pills whole. SCDs bilaterally. Pt to be DC to [facility] today. Call light in reach. Safety maintained.

## 2022-01-13 NOTE — DISCHARGE PLACEMENT REQUEST
" [facility]INCENT PARDO [phone]  Karly Dominguez (76 y.o. Female)             Date of Birth Social Security Number Address Home Phone MRN    1945  [address] [facility] [address] [place] [id number] [phone] [id number]    Islam Marital Status             Islam        Admission Date Admission Type Admitting Provider Attending Provider Department, Room/Bed    1/8/22 Emergency Kenn Moore, Kenn Lagunas,  [facility] 3A, 349/1    Discharge Date Discharge Disposition Discharge Destination                         Attending Provider: Kenn Moore,     Allergies: Augmentin [Amoxicillin-pot Clavulanate], Ceclor [Cefaclor], Codeine, Darvon [Propoxyphene], Fulvicin P-g [Griseofulvin], Niacin, Valium [Diazepam]    Isolation: None   Infection: COVID Screen (preop/placement) (01/13/22)   Code Status: No CPR   Advance Care Planning Activity    Ht: 157.5 cm (62\")   Wt: 91.7 kg (202 lb 3.2 oz)    Admission Cmt: None   Principal Problem: Altered mental status [R41.82]                 Active Insurance as of 1/8/2022     Primary Coverage     Payor Plan Insurance Group Employer/Plan Group    ANTHEM MEDICARE REPLACEMENT ANTHEM MEDICARE ADVANTAGE [id number]     Payor Plan Address Payor Plan Phone Number Payor Plan Fax Number Effective Dates    [address] [phone]  1/1/2015 - None Entered    [facility] [address]       Subscriber Name Subscriber Birth Date Member ID       KARLY DOMINGUEZ 1945 [id number]                 Emergency Contacts      (Rel.) Home Phone Work Phone Mobile Phone    Kassidy Lind (Daughter) [phone] -- [phone]              "

## 2022-01-13 NOTE — PLAN OF CARE
Goal Outcome Evaluation:  Plan of Care Reviewed With: patient           Outcome Summary: Patient alert and oriented times 1-2, complains of back pain, medicated times 2 this shift, up with assist of one and walker to BR, VSS, Jose Roberto, BARRIENTOS, safety maintained, will continue to monitor.

## 2022-01-13 NOTE — DISCHARGE SUMMARY
[facility] Medicine Services  DISCHARGE SUMMARY       Date of Admission: 1/8/2022  Date of Discharge:  1/13/2022  Primary Care Physician: Pb Mitchell APRN    Presenting Problem/History of Present Illness:  Altered mental status, unspecified altered mental status type [R41.82]  Hyponatremia [E87.1]     Final Discharge Diagnoses:  Active Hospital Problems    Diagnosis    • **Altered mental status    • Hyponatremia    • Hypokalemia    • 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 Tavarez, surgery    Procedures Performed:   none    Pertinent Test Results:   Procedure Component Value Units Date/Time   CT Head Without Contrast [[id number]] Josh as Reviewed   Order Status: Completed Collected: 01/08/22 1202    Updated: 01/08/22 1209   Narrative:     EXAM:   CT OF THE HEAD WITHOUT IV CONTRAST   CT CERVICAL SPINE WITHOUT IV CONTRAST 1/8/2022.       COMPARISON: None       INDICATION: Altered mental status       PROCEDURE: Non contrast enhanced head CT and cervical spine CT were   performed. The head images were formatted in the axial plane at 5 mm   thick intervals. The cervical spine images were formatted in the axial,   coronal and sagittal planes.       Radiation dose equals DLP 1004 mGy-cm.  Automated exposure control dose   reduction technique was implemented.       FINDINGS:   HEAD: Generalized volume loss. Chronic microvessel changes.. There is no   evidence of mass-effect or midline shift. The gray-white differentiation   is preserved.  There is no evidence of intracranial contusion,   hemorrhage, or skull fracture.  The visualized portions of the paranasal   sinuses and mastoid air cells are unremarkable.       CERVICAL SPINE: The odontoid process is well approximated with the   anterior body of C1 and well aligned between the lateral masses of C1.   Reversal  of the normal lordosis of the cervical spine with grade 1   listhesis of C2 on C3 C3 and C4. There is no acute compression   deformity. No dislocation. Multilevel degenerative changes, with disc   disease and facet arthropathy. Visualized lung apices demonstrate no   focal consolidation, pleural effusion or pneumothorax. There is no   paraspinal hematoma.                   Impression:     CT head. No acute intracranial abnormalities.       CT cervical spine. No acute osseous posttraumatic findings.   This report was finalized on 01/08/2022 12:06 by Dr. Kandace Fabian MD.   CT Cervical Spine Without Contrast [[id number]] Josh as Reviewed   Order Status: Completed Collected: 01/08/22 1202    Updated: 01/08/22 1209   Narrative:     EXAM:   CT OF THE HEAD WITHOUT IV CONTRAST   CT CERVICAL SPINE WITHOUT IV CONTRAST 1/8/2022.       COMPARISON: None       INDICATION: Altered mental status       PROCEDURE: Non contrast enhanced head CT and cervical spine CT were   performed. The head images were formatted in the axial plane at 5 mm   thick intervals. The cervical spine images were formatted in the axial,   coronal and sagittal planes.       Radiation dose equals DLP 1004 mGy-cm.  Automated exposure control dose   reduction technique was implemented.       FINDINGS:   HEAD: Generalized volume loss. Chronic microvessel changes.. There is no   evidence of mass-effect or midline shift. The gray-white differentiation   is preserved.  There is no evidence of intracranial contusion,   hemorrhage, or skull fracture.  The visualized portions of the paranasal   sinuses and mastoid air cells are unremarkable.       CERVICAL SPINE: The odontoid process is well approximated with the   anterior body of C1 and well aligned between the lateral masses of C1.   Reversal of the normal lordosis of the cervical spine with grade 1   listhesis of C2 on C3 C3 and C4. There is no acute compression   deformity. No dislocation. Multilevel  degenerative changes, with disc   disease and facet arthropathy. Visualized lung apices demonstrate no   focal consolidation, pleural effusion or pneumothorax. There is no   paraspinal hematoma.                   Impression:     CT head. No acute intracranial abnormalities.       CT cervical spine. No acute osseous posttraumatic findings.   This report was finalized on 01/08/2022 12:06 by Dr. Kandace Fabian MD.   XR Chest 1 View [[id number]] Josh as Reviewed   Order Status: Completed Collected: 01/08/22 1142    Updated: 01/08/22 1146   Narrative:     Exam:   XR CHEST 1 VW-         Date:  1/8/2022       History:  Female, age  76 years;ams; R41.82-Altered mental status,   unspecified; S09.90XA-Unspecified injury of head, initial encounter;   W19.XXXA-Unspecified fall, initial encounter       COMPARISON:  Chest x-ray dated 11/10/2021       Findings :       Mild cardiomegaly. Chronic coarsening of the interstitium. There is a   catheter projected over the left hemithorax. Lungs are without focal   infiltrate, mass or effusions.  The bones show no acute pathology.         Impression:     Impression:       Mild cardiomegaly, with chronic interstitial lung changes.       This report was finalized on 01/08/2022 11:43 by Dr. Kandace Fabian MD.         Chief Complaint on Day of Discharge:   Follow-up hyponatremia    History of Present Illness on Day of Discharge:   Patient is doing better.  Tolerating p.o.  No nausea or vomiting.  No chest pain or shortness of breath.    Hospital Course:  The patient is a 76 y.o. female who presented to the hospital with altered mental status.  She had evidence of hyponatremia and had been not eating or drinking well initially per reports.  Daughter feels her mom likely has underlying dementia.  Short-term memory loss at baseline.  Lives by herself.  She recently had a hematoma and drainage from the left breast on 11/30 with a drainage tube placed on 12/21.  The drainage tube was removed  "while in the hospital by Dr. Tavarez plans for follow-up in a month.  There was some concern during stay may be her mental status was somewhat being affected by pain meds.  Her pain meds were reduced.  Note that she has had some chronic level of hyponatremia since November.  Her home hydrochlorothiazide was held.  Overall her blood pressure is actually doing fairly well here without much medications.  We will resume her on valsartan daily at discharge.  She has improved however and appears at baseline.  Felt to have maximized inpatient stay.  Will discharge to skilled nursing facility today.    Condition on Discharge: stable/improved on room air    Physical Exam on Discharge:  /94 (BP Location: Right arm, Patient Position: Lying)   Pulse 98   Temp 98.2 °F (36.8 °C) (Oral)   Resp 18   Ht 157.5 cm (62\")   Wt 91.7 kg (202 lb 3.2 oz)   LMP  (LMP Unknown)   SpO2 95%   BMI 36.98 kg/m²   Physical Exam  GEN: Awake, alert, interactive, in NAD  HEENT:  PERRLA, EOMI, Anicteric, Trachea midline  Lungs: no wheezing/rales/rhonchi  Heart: RRR, +S1/s2, no rub  ABD: soft, nt/nd, +BS, no guarding/rebound  Extremities:  no cyanosis, no edema  Skin: no petechiae  Neuro: AAO x2-3, BARRIENTOS, no focal deficits  Psych: normal mood & affect       Discharge Disposition:  Skilled Nursing Facility (DC - External)    Discharge Medications:     Discharge Medications      New Medications      Instructions Start Date   oxyCODONE-acetaminophen 5-325 MG per tablet  Commonly known as: PERCOCET  Replaces: oxyCODONE-acetaminophen  MG per tablet   1 tablet, Oral, Every 6 Hours PRN         Changes to Medications      Instructions Start Date   valsartan 80 MG tablet  Commonly known as: DIOVAN  What changed: when to take this   80 mg, Oral, Daily         Continue These Medications      Instructions Start Date   acetaminophen 325 MG tablet  Commonly known as: Tylenol   975 mg, Oral, Every 8 Hours, Take every 8 hours for 3 days then take prn " as needed.      docusate sodium 100 MG capsule  Commonly known as: COLACE   100 mg, Oral, 2 Times Daily      Euthyrox 75 MCG tablet  Generic drug: levothyroxine   75 mcg, Oral, Daily      ondansetron 4 MG tablet  Commonly known as: Zofran   4 mg, Oral, Every 8 Hours PRN         Stop These Medications    oxyCODONE-acetaminophen  MG per tablet  Commonly known as: PERCOCET  Replaced by: oxyCODONE-acetaminophen 5-325 MG per tablet     triamterene-hydrochlorothiazide 75-50 MG per tablet  Commonly known as: MAXZIDE            Discharge Diet:    Dietary Orders (From admission, onward)     Start     Ordered    01/11/22 1532  Diet Soft Texture; Ground; Daily Fluid Restriction; 1500 mL Fluid Per Day  Diet Effective Now        Question Answer Comment   Diet Texture / Consistency Soft Texture    Select Texture: Ground    Common Modifiers Daily Fluid Restriction    Fluid Restriction Per Day: 1500 mL Fluid Per Day        01/11/22 1531                  Activity at Discharge:    Increase to baseline as tolerated    Discharge Care Plan/Instructions:   Take meds as prescribed, f/u with pcp and surgery    Follow-up Appointments:   Future Appointments   Date Time Provider Department Center   1/25/2022 11:00 AM Justice Lazar APRN [facility] P[facility]AD       Test Results Pending at Discharge: none    Electronically signed by Kenn Moore DO, 01/13/22, 10:49 CST.    Time: 33 minutes

## 2022-01-14 NOTE — PAYOR COMM NOTE
"DC TO SNF 1-13-22  [id number]     [phone]    Karly Dominguez (76 y.o. Female)             Date of Birth Social Security Number Address Home Phone MRN    1945  [address] [place] [address] [address] [place] [id number] [phone] [id number]    Alevism Marital Status             Baptism        Admission Date Admission Type Admitting Provider Attending Provider Department, Room/Bed    1/8/22 Emergency Kenn Moore DO  [facility] 3A, 349/1    Discharge Date Discharge Disposition Discharge Destination          1/13/2022 Skilled Nursing Facility (DC - External)              Attending Provider: (none)   Allergies: Augmentin [Amoxicillin-pot Clavulanate], Ceclor [Cefaclor], Codeine, Darvon [Propoxyphene], Fulvicin P-g [Griseofulvin], Niacin, Valium [Diazepam]    Isolation: None   Infection: None   Code Status: Prior   Advance Care Planning Activity    Ht: 157.5 cm (62\")   Wt: 91.7 kg (202 lb 3.2 oz)    Admission Cmt: None   Principal Problem: Altered mental status [R41.82]                 Active Insurance as of 1/8/2022     Primary Coverage     Payor Plan Insurance Group Employer/Plan Group    [employer]HEM MEDICARE REPLACEMENT AN[employer]M MEDICARE ADVANTAGE [id number]     Payor Plan Address Payor Plan Phone Number Payor Plan Fax Number Effective Dates    [address] [phone]  1/1/2015 - None Entered    [facility] [id number]       Subscriber Name Subscriber Birth Date Member ID       KARLY DOMINGUEZ 1945 [id number]                 Emergency Contacts      (Rel.) Home Phone Work Phone Mobile Phone    Kassidy Lind (Daughter) [phone] -- [phone]               Discharge Summary      Kenn Moore DO at 01/13/22 1049              [facility] Medicine Services  DISCHARGE SUMMARY       Date of Admission: 1/8/2022  Date of Discharge:  1/13/2022  Primary Care Physician: Pb Mitchell APRN    Presenting Problem/History of Present " Illness:  Altered mental status, unspecified altered mental status type [R41.82]  Hyponatremia [E87.1]     Final Discharge Diagnoses:  Active Hospital Problems    Diagnosis    • **Altered mental status    • Hyponatremia    • Hypokalemia    • 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 Tavarez, surgery    Procedures Performed:   none    Pertinent Test Results:   Procedure Component Value Units Date/Time   CT Head Without Contrast [[id number]] Josh as Reviewed   Order Status: Completed Collected: 01/08/22 1202    Updated: 01/08/22 1209   Narrative:     EXAM:   CT OF THE HEAD WITHOUT IV CONTRAST   CT CERVICAL SPINE WITHOUT IV CONTRAST 1/8/2022.       COMPARISON: None       INDICATION: Altered mental status       PROCEDURE: Non contrast enhanced head CT and cervical spine CT were   performed. The head images were formatted in the axial plane at 5 mm   thick intervals. The cervical spine images were formatted in the axial,   coronal and sagittal planes.       Radiation dose equals DLP 1004 mGy-cm.  Automated exposure control dose   reduction technique was implemented.       FINDINGS:   HEAD: Generalized volume loss. Chronic microvessel changes.. There is no   evidence of mass-effect or midline shift. The gray-white differentiation   is preserved.  There is no evidence of intracranial contusion,   hemorrhage, or skull fracture.  The visualized portions of the paranasal   sinuses and mastoid air cells are unremarkable.       CERVICAL SPINE: The odontoid process is well approximated with the   anterior body of C1 and well aligned between the lateral masses of C1.   Reversal of the normal lordosis of the cervical spine with grade 1   listhesis of C2 on C3 C3 and C4. There is no acute compression   deformity. No dislocation. Multilevel degenerative changes, with disc   disease and facet arthropathy. Visualized  lung apices demonstrate no   focal consolidation, pleural effusion or pneumothorax. There is no   paraspinal hematoma.                   Impression:     CT head. No acute intracranial abnormalities.       CT cervical spine. No acute osseous posttraumatic findings.   This report was finalized on 01/08/2022 12:06 by Dr. Kandace Fabian MD.   CT Cervical Spine Without Contrast [[id number]] Josh as Reviewed   Order Status: Completed Collected: 01/08/22 1202    Updated: 01/08/22 1209   Narrative:     EXAM:   CT OF THE HEAD WITHOUT IV CONTRAST   CT CERVICAL SPINE WITHOUT IV CONTRAST 1/8/2022.       COMPARISON: None       INDICATION: Altered mental status       PROCEDURE: Non contrast enhanced head CT and cervical spine CT were   performed. The head images were formatted in the axial plane at 5 mm   thick intervals. The cervical spine images were formatted in the axial,   coronal and sagittal planes.       Radiation dose equals DLP 1004 mGy-cm.  Automated exposure control dose   reduction technique was implemented.       FINDINGS:   HEAD: Generalized volume loss. Chronic microvessel changes.. There is no   evidence of mass-effect or midline shift. The gray-white differentiation   is preserved.  There is no evidence of intracranial contusion,   hemorrhage, or skull fracture.  The visualized portions of the paranasal   sinuses and mastoid air cells are unremarkable.       CERVICAL SPINE: The odontoid process is well approximated with the   anterior body of C1 and well aligned between the lateral masses of C1.   Reversal of the normal lordosis of the cervical spine with grade 1   listhesis of C2 on C3 C3 and C4. There is no acute compression   deformity. No dislocation. Multilevel degenerative changes, with disc   disease and facet arthropathy. Visualized lung apices demonstrate no   focal consolidation, pleural effusion or pneumothorax. There is no   paraspinal hematoma.                   Impression:     CT head. No acute  intracranial abnormalities.       CT cervical spine. No acute osseous posttraumatic findings.   This report was finalized on 01/08/2022 12:06 by Dr. Kandace Fabian MD.   XR Chest 1 View [[id number]] Josh as Reviewed   Order Status: Completed Collected: 01/08/22 1142    Updated: 01/08/22 1146   Narrative:     Exam:   XR CHEST 1 VW-         Date:  1/8/2022       History:  Female, age  76 years;ams; R41.82-Altered mental status,   unspecified; S09.90XA-Unspecified injury of head, initial encounter;   W19.XXXA-Unspecified fall, initial encounter       COMPARISON:  Chest x-ray dated 11/10/2021       Findings :       Mild cardiomegaly. Chronic coarsening of the interstitium. There is a   catheter projected over the left hemithorax. Lungs are without focal   infiltrate, mass or effusions.  The bones show no acute pathology.         Impression:     Impression:       Mild cardiomegaly, with chronic interstitial lung changes.       This report was finalized on 01/08/2022 11:43 by Dr. Kandace Fabian MD.         Chief Complaint on Day of Discharge:   Follow-up hyponatremia    History of Present Illness on Day of Discharge:   Patient is doing better.  Tolerating p.o.  No nausea or vomiting.  No chest pain or shortness of breath.    Hospital Course:  The patient is a 76 y.o. female who presented to the hospital with altered mental status.  She had evidence of hyponatremia and had been not eating or drinking well initially per reports.  Daughter feels her mom likely has underlying dementia.  Short-term memory loss at baseline.  Lives by herself.  She recently had a hematoma and drainage from the left breast on 11/30 with a drainage tube placed on 12/21.  The drainage tube was removed while in the hospital by Dr. Tavarez plans for follow-up in a month.  There was some concern during stay may be her mental status was somewhat being affected by pain meds.  Her pain meds were reduced.  Note that she has had some chronic level of  "hyponatremia since November.  Her home hydrochlorothiazide was held.  Overall her blood pressure is actually doing fairly well here without much medications.  We will resume her on valsartan daily at discharge.  She has improved however and appears at baseline.  Felt to have maximized inpatient stay.  Will discharge to skilled nursing facility today.    Condition on Discharge: stable/improved on room air    Physical Exam on Discharge:  /94 (BP Location: Right arm, Patient Position: Lying)   Pulse 98   Temp 98.2 °F (36.8 °C) (Oral)   Resp 18   Ht 157.5 cm (62\")   Wt 91.7 kg (202 lb 3.2 oz)   LMP  (LMP Unknown)   SpO2 95%   BMI 36.98 kg/m²   Physical Exam  GEN: Awake, alert, interactive, in NAD  HEENT:  PERRLA, EOMI, Anicteric, Trachea midline  Lungs: no wheezing/rales/rhonchi  Heart: RRR, +S1/s2, no rub  ABD: soft, nt/nd, +BS, no guarding/rebound  Extremities:  no cyanosis, no edema  Skin: no petechiae  Neuro: AAO x2-3, BARRIENTOS, no focal deficits  Psych: normal mood & affect       Discharge Disposition:  Skilled Nursing Facility (DC - External)    Discharge Medications:     Discharge Medications      New Medications      Instructions Start Date   oxyCODONE-acetaminophen 5-325 MG per tablet  Commonly known as: PERCOCET  Replaces: oxyCODONE-acetaminophen  MG per tablet   1 tablet, Oral, Every 6 Hours PRN         Changes to Medications      Instructions Start Date   valsartan 80 MG tablet  Commonly known as: DIOVAN  What changed: when to take this   80 mg, Oral, Daily         Continue These Medications      Instructions Start Date   acetaminophen 325 MG tablet  Commonly known as: Tylenol   975 mg, Oral, Every 8 Hours, Take every 8 hours for 3 days then take prn as needed.      docusate sodium 100 MG capsule  Commonly known as: COLACE   100 mg, Oral, 2 Times Daily      Euthyrox 75 MCG tablet  Generic drug: levothyroxine   75 mcg, Oral, Daily      ondansetron 4 MG tablet  Commonly known as: Zofran   4 mg, " Oral, Every 8 Hours PRN         Stop These Medications    oxyCODONE-acetaminophen  MG per tablet  Commonly known as: PERCOCET  Replaced by: oxyCODONE-acetaminophen 5-325 MG per tablet     triamterene-hydrochlorothiazide 75-50 MG per tablet  Commonly known as: MAXZIDE            Discharge Diet:    Dietary Orders (From admission, onward)     Start     Ordered    01/11/22 1532  Diet Soft Texture; Ground; Daily Fluid Restriction; 1500 mL Fluid Per Day  Diet Effective Now        Question Answer Comment   Diet Texture / Consistency Soft Texture    Select Texture: Ground    Common Modifiers Daily Fluid Restriction    Fluid Restriction Per Day: 1500 mL Fluid Per Day        01/11/22 1531                  Activity at Discharge:    Increase to baseline as tolerated    Discharge Care Plan/Instructions:   Take meds as prescribed, f/u with pcp and surgery    Follow-up Appointments:   Future Appointments   Date Time Provider Department Center   1/25/2022 11:00 AM Justice Lazar APRN [facility] POD PAD PAD       Test Results Pending at Discharge: none    Electronically signed by Kenn Moore DO, 01/13/22, 10:49 CST.    Time: 33 minutes          Electronically signed by Kenn Moore DO at 01/13/22 1144

## 2022-01-14 NOTE — THERAPY DISCHARGE NOTE
Acute Care - Physical Therapy Discharge Summary  [facility]       Patient Name: Alissa Dominguez  : 1945  MRN: [id number]    Today's Date: 2022                 Admit Date: 2022      PT Recommendation and Plan    Visit Dx:    ICD-10-CM ICD-9-CM   1. Hyponatremia  E87.1 276.1   2. Closed head injury, initial encounter  S09.90XA 959.01   3. Fall, initial encounter  W19.XXXA E888.9   4. Altered mental status, unspecified altered mental status type  R41.82 780.97   5. Delirium  R41.0 780.09   6. Hypokalemia  E87.6 276.8   7. Dysphagia, unspecified type  R13.10 787.20   8. Impaired mobility  Z74.09 799.89   9. Impaired mobility and ADLs  Z74.09 V49.89    Z78.9    10. 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, estrogen receptor positive, unspecified laterality (HCC)  C50.419 174.4    Z17.0 V86.0                PT Rehab Goals     Row Name 22 [id number]             Bed Mobility Goal 1 (PT)    Activity/Assistive Device (Bed Mobility Goal 1, PT) bed mobility activities, all  -AB      [place] Level/Cues Needed (Bed Mobility Goal 1, PT) independent  -AB      Time Frame (Bed Mobility Goal 1, PT) long term goal (LTG); by discharge  -AB      Progress/Outcomes (Bed Mobility Goal 1, PT) goal not met  -AB              Transfer Goal 1 (PT)    Activity/Assistive Device (Transfer Goal 1, PT) sit-to-stand/stand-to-sit; bed-to-chair/chair-to-bed; walker, rolling  -AB      [place] Level/Cues Needed (Transfer Goal 1, PT) modified independence  -AB      Time Frame (Transfer Goal 1, PT) long term goal (LTG); by discharge  -AB      Progress/Outcome (Transfer Goal 1, PT) goal not met  -AB              Gait Training Goal 1 (PT)    Activity/Assistive Device (Gait Training Goal 1, PT) gait (walking locomotion); assistive device use; walker, rolling  -AB      [place] Level (Gait Training Goal 1, PT) supervision required  -AB      Distance (Gait Training Goal 1, PT) 75ft maintaining O2 saturation at  or above 90%  -AB      Time Frame (Gait Training Goal 1, PT) long term goal (LTG); by discharge  -AB      Progress/Outcome (Gait Training Goal 1, PT) goal not met  -AB            User Key  (r) = Recorded By, (t) = Taken By, (c) = Cosigned By    Initials Name Provider Type Discipline    Marybel Wong, PTA Physical Therapy Assistant PT                    PT Discharge Summary  Anticipated Discharge Disposition (PT): skilled nursing facility  Reason for Discharge: Discharge from facility  Outcomes Achieved: Refer to plan of care for updates on goals achieved  Discharge Destination: SNF      Marybel Bauman, DADA   1/14/2022

## 2022-01-15 NOTE — THERAPY DISCHARGE NOTE
Acute Care - Occupational Therapy Discharge Summary  [facility]     Patient Name: Alissa Dominguez  : 1945  MRN: [id number]    Today's Date: 1/15/2022                 Admit Date: 2022        OT Recommendation and Plan    Visit Dx:    ICD-10-CM ICD-9-CM   1. Hyponatremia  E87.1 276.1   2. Closed head injury, initial encounter  S09.90XA 959.01   3. Fall, initial encounter  W19.XXXA E888.9   4. Altered mental status, unspecified altered mental status type  R41.82 780.97   5. Delirium  R41.0 780.09   6. Hypokalemia  E87.6 276.8   7. Dysphagia, unspecified type  R13.10 787.20   8. Impaired mobility  Z74.09 799.89   9. Impaired mobility and ADLs  Z74.09 V49.89    Z78.9    10. 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, estrogen receptor positive, unspecified laterality (HCC)  C50.419 174.4    Z17.0 V86.0                OT Rehab Goals     Row Name 01/15/22 0700             Bathing Goal 1 (OT)    Activity/Device (Bathing Goal 1, OT) bathing skills, all  -TS      [place] Level/Cues Needed (Bathing Goal 1, OT) supervision required; set-up required; verbal cues required  -TS      Time Frame (Bathing Goal 1, OT) long term goal (LTG); by discharge  -TS      Progress/Outcomes (Bathing Goal 1, OT) goal not met  -TS              Dressing Goal 1 (OT)    Activity/Device (Dressing Goal 1, OT) dressing skills, all  -TS      [place]/Cues Needed (Dressing Goal 1, OT) supervision required; set-up required  -TS      Time Frame (Dressing Goal 1, OT) long term goal (LTG); by discharge  -TS      Progress/Outcome (Dressing Goal 1, OT) goal not met  -TS              Toileting Goal 1 (OT)    Activity/Device (Toileting Goal 1, OT) toileting skills, all  -TS      [place] Level/Cues Needed (Toileting Goal 1, OT) independent  -TS      Time Frame (Toileting Goal 1, OT) long term goal (LTG); by discharge  -TS      Progress/Outcome (Toileting Goal 1, OT) goal not met  -TS            User Key  (r) = Recorded  By, (t) = Taken By, (c) = Cosigned By    Initials Name Provider Type Discipline    TS Val Castro COTA Occupational Therapy Assistant OT                    Timed Therapy Charges  Total Units: 4    Charges  Total Units: 4    Procedure Name Documented Minutes Units Code    HC OT SELF CARE/MGMT/TRAIN EA 15 MIN 54  4    32015 (CPT®)               Documented Minutes  Total Minutes: 54    Therapy Provided Minutes    53544 - OT Self Care/Mgmt Minutes 54                    OT Discharge Summary  Anticipated Discharge Disposition (OT): skilled nursing facility  Reason for Discharge: Discharge from facility  Outcomes Achieved: Refer to plan of care for updates on goals achieved  Discharge Destination: SNF      NARA Khanna  1/15/2022

## 2022-01-16 PROBLEM — R09.02 HYPOXIA: Status: ACTIVE | Noted: 2022-01-01

## 2022-01-16 NOTE — ED PROVIDER NOTES
Subjective   76 yof with PMH of hypothyroidism, DM, hyperlipidemia, JOSE MANUEL, HTN, COPD, DDD, GERD, migraine headaches and breast cancer presents via EMS with c/o SOB. EMS states she has fallen several times and is here for back pain.  The patient denies back pain to me.  She states she is short of breath and she is here for evaluation of the SOB.  She does have a history of COPD but does not wear oxygen.  She denies CP.  She denies fever or cough.  She denies n/v/d.            Review of Systems   Constitutional: Negative for activity change, appetite change, fatigue and fever.   HENT: Negative for congestion, ear pain, facial swelling and sore throat.    Eyes: Negative for discharge and visual disturbance.   Respiratory: Positive for shortness of breath. Negative for apnea, chest tightness, wheezing and stridor.    Cardiovascular: Negative for chest pain and palpitations.   Gastrointestinal: Negative for abdominal distention, abdominal pain, diarrhea, nausea and vomiting.   Genitourinary: Negative for difficulty urinating and dysuria.   Musculoskeletal: Negative for arthralgias and myalgias.   Skin: Negative for rash and wound.   Neurological: Negative for dizziness and seizures.   Psychiatric/Behavioral: Negative for agitation and confusion.       Past Medical History:   Diagnosis Date   • Cataracts, bilateral    • Cellulitis of left lower limb    • Cellulitis of right lower limb    • COPD (chronic obstructive pulmonary disease) (HCC)    • DJD (degenerative joint disease), cervical     DJD C Spine/Spondylolysis, Cervical Region   • Essential (primary) hypertension    • GERD (gastroesophageal reflux disease)    • Hematoma     Left mastectomy site   • Hypothyroidism    • Major depressive disorder, recurrent, moderate (HCC)    • Malignant neoplasm of left breast (HCC)    • Migraine    • Morbid (severe) obesity due to excess calories ([facility])    • Obstructive sleep apnea    • PONV (postoperative nausea and vomiting)    •  "Primary generalized (osteo)arthritis    • Pure hypercholesterolemia    • Tinea unguium    • Type 2 diabetes mellitus with hyperglycemia (HCC)    • Venous insufficiency (chronic) (peripheral)        Allergies   Allergen Reactions   • Augmentin [Amoxicillin-Pot Clavulanate] Other (See Comments)     UNKNOWN   • Ceclor [Cefaclor] Other (See Comments)     UNKNOWN     • Codeine Other (See Comments)     Not Specified   • Darvon [Propoxyphene] Other (See Comments)     Not Specified   • Fulvicin P-G [Griseofulvin] Other (See Comments)     UNKNOWN   • Niacin Other (See Comments)     UNKNOWN   • Valium [Diazepam] Other (See Comments)     UNKNOWN       Past Surgical History:   Procedure Laterality Date   • BREAST ABSCESS INCISION AND DRAINAGE Left 12/21/2021    Procedure: EVACUATION OF LEFT MASTECTOMY HEMATOMA;  Surgeon: Naye Tavarez MD;  Location:  [facility] OR;  Service: General;  Laterality: Left;   • BREAST SURGERY Left     Left mastectomy   • CATARACT EXTRACTION, BILATERAL     • CATARACT EXTRACTION, BILATERAL     • ESSURE TUBAL LIGATION     • HYSTERECTOMY     • MASTECTOMY W/ SENTINEL NODE BIOPSY Left 11/30/2021    Procedure: LEFT MASTECTOMY WITH SENTINEL LYMPH NODE BIOPSY WITH MAGTRACE;  Surgeon: Naye Tavarez MD;  Location:  [facility] OR;  Service: General;  Laterality: Left;   • VARICOSE VEIN SURGERY Right        Family History   Problem Relation Age of Onset   • Hypertension Sister    • Heart disease Mother        Social History     Socioeconomic History   • Marital status:    Tobacco Use   • Smoking status: Current Every Day Smoker     Packs/day: 1.00     Types: Electronic Cigarette   • Smokeless tobacco: Never Used   Vaping Use   • Vaping Use: Every day   • Substances: Nicotine, patient states it has \"3%\" not sure if it is THC or CBD   • Devices: Disposable   • Passive vaping exposure: Yes   Substance and Sexual Activity   • Alcohol use: No   • Drug use: No   • Sexual activity: Defer           Objective "   Physical Exam  Constitutional:       Appearance: She is well-developed.   HENT:      Head: Normocephalic.   Eyes:      Pupils: Pupils are equal, round, and reactive to light.   Cardiovascular:      Rate and Rhythm: Normal rate and regular rhythm.      Heart sounds: No murmur heard.      Pulmonary:      Effort: Pulmonary effort is normal.      Breath sounds: Normal breath sounds.   Abdominal:      General: Bowel sounds are normal.      Palpations: Abdomen is soft.   Musculoskeletal:         General: Normal range of motion.      Cervical back: Normal range of motion and neck supple.   Skin:     General: Skin is warm and dry.   Neurological:      Mental Status: She is alert.      Comments: She is alert and answers some questions appropriately.         Procedures           ED Course  ED Course as of 01/16/22 2140   Sun Jan 16, 2022   1546 I spoke with ROBERT Verduzco, at [facility] who is caring for the patient.  She states the patient has not fallen.  She states the patient has been dyspneic since lunch.  Her sp02 was 83%.  She is not routinely on oxygen.  She states she is on a wheat with several covid positive patients. She states her rapid covid test yesterday and today have been negative.   [KS]   2035 CTA of the chest - Summary: 1. No pulmonary embolism. 2. Chronic lung changes.  Chest xray - Summary: Stable chronic lung changes.  Covid test and influenza are negative.   Blood work are unremarkable.  She does have a UTI.  [KS]   2101 Assumed care of patient at this time from Ms. Aldo Blevins APRN. Will admit patient to the hospitalist services for further management of hypoxia, UTI, and concern for COVID. Please see ms. Sevilla's note for HPI, ROS, and physical examination findings.  [JS]   2102 I discussed the patient's history, assessment and testing results with Dr Green.  She has been hypoxic. He recommends admission.  Transfer of care to NEVILLE Bland PA-C. [KS]   2138 Phone discussion with Dr. Velazquez,  hospitalist.  Will kindly set patient for admission under her services at this time with no further recommendations. [JS]      ED Course User Index  [JS] Theo Bland PA-C  [KS] Aldo Blevins, HERMES                                                 MDM      Final diagnoses:   Hypoxia   Urinary tract infection without hematuria, site unspecified       ED Disposition  ED Disposition     ED Disposition Condition Comment    Decision to Admit  Level of Care: Med/Surg [1]   Diagnosis: Hypoxia [826314]   Admitting Physician: ROQUE NG [[id number]]            No follow-up provider specified.       Medication List      No changes were made to your prescriptions during this visit.          Theo Bland PA-C  01/16/22 0850

## 2022-01-17 PROBLEM — E03.9 HYPOTHYROIDISM: Status: ACTIVE | Noted: 2022-01-01

## 2022-01-17 PROBLEM — N30.00 ACUTE CYSTITIS: Status: ACTIVE | Noted: 2022-01-01

## 2022-01-17 NOTE — H&P
"    [facility] Medicine Services  HISTORY AND PHYSICAL    Date of Admission: 1/16/2022  Primary Care Physician: Pb Mitchell APRN    Subjective     Chief Complaint: \"back pain\"    History of Present Illness  76-year-old female who is a chronic resident at a local nursing facility.  She was initially brought in for \"back pain.\".  The patient does admit to some chronic lower back pain, but on arrival to the nursing facility, EMS noted the patient was hypoxic with an O2 in the 80s.  She was placed on 2 L nasal cannula and transported to our emergency department.  On removal of the 2 L nasal cannula, the patient returned her O2 saturation into the 80s.  She does not chronically wear oxygen.  Patient was also found to have a urinary tract infection.  Patient denies chest pain or shortness of breath.  She does have confusion, and is a poor historian.        Review of Systems   Lower back pain  Confusion    Otherwise complete ROS reviewed and negative except as mentioned in the HPI.    Past Medical History:   Past Medical History:   Diagnosis Date   • Cataracts, bilateral    • Cellulitis of left lower limb    • Cellulitis of right lower limb    • COPD (chronic obstructive pulmonary disease) ([facility])    • DJD (degenerative joint disease), cervical     DJD C Spine/Spondylolysis, Cervical Region   • Essential (primary) hypertension    • GERD (gastroesophageal reflux disease)    • Hematoma     Left mastectomy site   • Hypothyroidism    • Major depressive disorder, recurrent, moderate ([facility])    • Malignant neoplasm of left breast ([facility])    • Migraine    • Morbid (severe) obesity due to excess calories ([facility])    • Obstructive sleep apnea    • PONV (postoperative nausea and vomiting)    • Primary generalized (osteo)arthritis    • Pure hypercholesterolemia    • Tinea unguium    • Type 2 diabetes mellitus with hyperglycemia ([facility])    • Venous insufficiency (chronic) (peripheral)      Past Surgical " History:  Past Surgical History:   Procedure Laterality Date   • BREAST ABSCESS INCISION AND DRAINAGE Left 12/21/2021    Procedure: EVACUATION OF LEFT MASTECTOMY HEMATOMA;  Surgeon: Naye Tavarez MD;  Location:  [facility] OR;  Service: General;  Laterality: Left;   • BREAST SURGERY Left     Left mastectomy   • CATARACT EXTRACTION, BILATERAL     • CATARACT EXTRACTION, BILATERAL     • ESSURE TUBAL LIGATION     • HYSTERECTOMY     • MASTECTOMY W/ SENTINEL NODE BIOPSY Left 11/30/2021    Procedure: LEFT MASTECTOMY WITH SENTINEL LYMPH NODE BIOPSY WITH MAGTRACE;  Surgeon: Naye Tavarez MD;  Location:  [facility] OR;  Service: General;  Laterality: Left;   • VARICOSE VEIN SURGERY Right      Social History:  reports that she has been smoking electronic cigarette. She has been smoking about 1.00 pack per day. She has never used smokeless tobacco. She reports that she does not drink alcohol and does not use drugs.    Family History: family history includes Heart disease in her mother; Hypertension in her sister.       Allergies:  Allergies   Allergen Reactions   • Augmentin [Amoxicillin-Pot Clavulanate] Other (See Comments)     UNKNOWN   • Ceclor [Cefaclor] Other (See Comments)     UNKNOWN     • Codeine Other (See Comments)     Not Specified   • Darvon [Propoxyphene] Other (See Comments)     Not Specified   • Fulvicin P-G [Griseofulvin] Other (See Comments)     UNKNOWN   • Niacin Other (See Comments)     UNKNOWN   • Valium [Diazepam] Other (See Comments)     UNKNOWN       Medications:  Prior to Admission medications    Medication Sig Start Date End Date Taking? Authorizing Provider   acetaminophen (Tylenol) 325 MG tablet Take 3 tablets by mouth Every 8 (Eight) Hours. Take every 8 hours for 3 days then take prn as needed. 12/3/21 12/3/22  Naye Tavarez MD   docusate sodium (COLACE) 100 MG capsule Take 100 mg by mouth 2 (Two) Times a Day.    Provider, MD Chelsie   levothyroxine (Euthyrox) 75 MCG tablet Take 75 mcg  "by mouth Daily.    Provider, MD Chelsie   ondansetron (Zofran) 4 MG tablet Take 1 tablet by mouth Every 8 (Eight) Hours As Needed for Nausea. 12/21/21 12/21/22  Naye Tavarez MD   oxyCODONE-acetaminophen (PERCOCET) 5-325 MG per tablet Take 1 tablet by mouth Every 6 (Six) Hours As Needed for Moderate Pain  for up to 3 days. 1/13/22 1/16/22  Kenn Moore DO   valsartan (DIOVAN) 80 MG tablet Take 1 tablet by mouth Daily. 1/13/22   Kenn Moore DO     I have utilized all available immediate resources to obtain, update, and review the patient's current medications.    Objective     Vital Signs: /94 (BP Location: Left arm, Patient Position: Lying)   Pulse 50   Temp 98.9 °F (37.2 °C) (Oral)   Resp 22   Ht 172.7 cm (68\")   Wt 87.1 kg (192 lb)   LMP  (LMP Unknown)   SpO2 94%   BMI 29.19 kg/m²   Physical Exam  Vitals reviewed.   Constitutional:       Appearance: She is ill-appearing.   HENT:      Head: Normocephalic and atraumatic.      Nose: Nose normal.   Eyes:      Conjunctiva/sclera: Conjunctivae normal.      Comments: Appears to have a right upper lid droop.   Cardiovascular:      Rate and Rhythm: Normal rate and regular rhythm.      Heart sounds: Normal heart sounds.   Pulmonary:      Effort: Pulmonary effort is normal.      Breath sounds: Normal breath sounds.   Abdominal:      General: There is no distension.      Palpations: Abdomen is soft.   Musculoskeletal:         General: No swelling or tenderness.      Cervical back: Normal range of motion and neck supple.      Right lower leg: No edema.      Left lower leg: No edema.   Skin:     General: Skin is warm and dry.      Comments: Right pretibial skin wound.  Black discoloration with no surrounding erythema.   Neurological:      Mental Status: She is alert.      Cranial Nerves: No cranial nerve deficit.      Comments: Patient will follow directions.  She has difficulty giving her history, and occasional confusion.  But she is able " to tell me she is a resident of a local nursing facility.   Psychiatric:         Mood and Affect: Mood normal.         Behavior: Behavior normal.       Results Reviewed:  Lab Results (last 24 hours)     Procedure Component Value Units Date/Time    Urinalysis With Culture If Indicated - Urine, Clean Catch [[id number]]  (Abnormal) Collected: 01/16/22 1907    Specimen: Urine, Clean Catch Updated: 01/16/22 1954     Color, UA Dark Yellow     Appearance, UA Cloudy     pH, UA <=5.0     Specific Gravity, UA >1.030     Glucose, UA Negative     Ketones, UA Trace     Bilirubin, UA Small (1+)     Blood, UA Small (1+)     Protein, UA >=300 mg/dL (3+)     Leuk Esterase, UA Trace     Nitrite, UA Positive     Urobilinogen, UA 1.0 E.U./dL    Urinalysis, Microscopic Only - Urine, Clean Catch [[id number]]  (Abnormal) Collected: 01/16/22 1907    Specimen: Urine, Clean Catch Updated: 01/16/22 1954     RBC, UA 0-2 /HPF      WBC, UA 13-20 /HPF      Bacteria, UA 4+ /HPF      Squamous Epithelial Cells, UA 3-6 /HPF      Hyaline Casts, UA None Seen /LPF      Methodology Manual Light Microscopy    Urine Culture - Urine, Urine, Clean Catch [[id number]] Collected: 01/16/22 1907    Specimen: Urine, Clean Catch Updated: 01/16/22 1954    Influenza Antigen, Rapid - Swab, Nasopharynx [[id number]]  (Normal) Collected: 01/16/22 1647    Specimen: Swab from Nasopharynx Updated: 01/16/22 1821     Influenza A Ag, EIA Negative     Influenza B Ag, EIA Negative    Narrative:      Recommend confirmation of negative results by viral culture or molecular assay.    COVID PRE-OP / PRE-PROCEDURE SCREENING ORDER (NO ISOLATION) - Swab, Nasal Cavity [[id number]]  (Normal) Collected: 01/16/22 1647    Specimen: Swab from Nasal Cavity Updated: 01/16/22 1816    Narrative:      The following orders were created for panel order COVID PRE-OP / PRE-PROCEDURE SCREENING ORDER (NO ISOLATION) - Swab, Nasal Cavity.  Procedure                               Abnormality         Status                      ---------                               -----------         ------                     COVID-19,[employer] IN-CHRISTINE...[[id number]]  Normal              Final result                 Please view results for these tests on the individual orders.    COVID-19,[employer] [employer] IN-HOUSE,Nasal Swab No Transport Media 3-4 HR TAT - Swab, Nasal Cavity [[id number]]  (Normal) Collected: 01/16/22 1647    Specimen: Swab from Nasal Cavity Updated: 01/16/22 1816     COVID19 Not Detected    Narrative:      Fact sheet for providers: https://www.fda.gov/media/504105/download     Fact sheet for patients: https://www.Patient Feed.gov/media/588671/download    Test performed by PCR.    Consider negative results in combination with clinical observations, patient history, and epidemiological information.  Fact sheet for providers: https://www.fda.gov/media/780341/download     Fact sheet for patients: https://www.Patient Feed.gov/media/395298/download    Test performed by PCR.    Consider negative results in combination with clinical observations, patient history, and epidemiological information.    D-dimer, Quantitative [[id number]]  (Abnormal) Collected: 01/16/22 1648    Specimen: Blood Updated: 01/16/22 1811     D-Dimer, Quantitative 1.60 mg/L (FEU)     Narrative:      Reference Range is 0-0.50 mg/L FEU. However, results <0.50 mg/L FEU tends to rule out DVT or PE. Results >0.50 mg/L FEU are not useful in predicting absence or presence of DVT or PE.      Protime-INR [[id number]]  (Abnormal) Collected: 01/16/22 1648    Specimen: Blood Updated: 01/16/22 1740     Protime 14.7 Seconds      INR 1.20    Procalcitonin [[id number]]  (Normal) Collected: 01/16/22 1648    Specimen: Blood Updated: 01/16/22 1734     Procalcitonin 0.10 ng/mL     Narrative:      As a Marker for Sepsis (Non-Neonates):     1. <0.5 ng/mL represents a low risk of severe sepsis and/or septic shock.  2. >2 ng/mL represents a high risk of severe sepsis and/or septic shock.    As a Marker for Lower  "Respiratory Tract Infections that require antibiotic therapy:  PCT on Admission     Antibiotic Therapy             6-12 Hrs later  >0.5                          Strongly Recommended            >0.25 - <0.5             Recommended  0.1 - 0.25                  Discouraged                       Remeasure/reassess PCT  <0.1                         Strongly Discouraged         Remeasure/reassess PCT      As 28 day mortality risk marker: \"Change in Procalcitonin Result\" (>80% or <=80%) if Day 0 (or Day 1) and Day 4 values are available. Refer to http://www.[facility]ettaAdiCytepct-calculator.com/    Change in PCT <=80 %   A decrease of PCT levels below or equal to 80% defines a positive change in PCT test result representing a higher risk for 28-day all-cause mortality of patients diagnosed with severe sepsis or septic shock.    Change in PCT >80 %   A decrease of PCT levels of more than 80% defines a negative change in PCT result representing a lower risk for 28-day all-cause mortality of patients diagnosed with severe sepsis or septic shock.                Comprehensive Metabolic Panel [[id number]]  (Abnormal) Collected: 01/16/22 1648    Specimen: Blood Updated: 01/16/22 1729     Glucose 169 mg/dL      BUN 18 mg/dL      Creatinine 0.71 mg/dL      Sodium 143 mmol/L      Potassium 3.8 mmol/L      Chloride 104 mmol/L      CO2 28.0 mmol/L      Calcium 9.4 mg/dL      Total Protein 7.0 g/dL      Albumin 4.00 g/dL      ALT (SGPT) 11 U/L      AST (SGOT) 19 U/L      Alkaline Phosphatase 66 U/L      Total Bilirubin 0.6 mg/dL      eGFR Non African Amer 80 mL/min/1.73      Globulin 3.0 gm/dL      A/G Ratio 1.3 g/dL      BUN/Creatinine Ratio 25.4     Anion Gap 11.0 mmol/L     Narrative:      GFR Normal >60  Chronic Kidney Disease <60  Kidney Failure <15      Troponin [[id number]]  (Normal) Collected: 01/16/22 1648    Specimen: Blood Updated: 01/16/22 1724     Troponin T <0.010 ng/mL     Narrative:      Troponin T Reference Range:  <= 0.03 ng/mL-   " Negative for AMI  >0.03 ng/mL-     Abnormal for myocardial necrosis.  Clinicians would have to utilize clinical acumen, EKG, Troponin and serial changes to determine if it is an Acute Myocardial Infarction or myocardial injury due to an underlying chronic condition.       Results may be falsely decreased if patient taking Biotin.      Lipase [[id number]]  (Normal) Collected: 01/16/22 1648    Specimen: Blood Updated: 01/16/22 1724     Lipase 16 U/L     Lactic Acid, Plasma [[id number]]  (Normal) Collected: 01/16/22 1648    Specimen: Blood Updated: 01/16/22 1719     Lactate 1.8 mmol/L     CBC & Differential [[id number]]  (Abnormal) Collected: 01/16/22 1648    Specimen: Blood Updated: 01/16/22 1706    Narrative:      The following orders were created for panel order CBC & Differential.  Procedure                               Abnormality         Status                     ---------                               -----------         ------                     CBC Auto Differential[[id number]]        Abnormal            Final result                 Please view results for these tests on the individual orders.    CBC Auto Differential [[id number]]  (Abnormal) Collected: 01/16/22 1648    Specimen: Blood Updated: 01/16/22 1706     WBC 5.82 10*3/mm3      RBC 5.54 10*6/mm3      Hemoglobin 13.0 g/dL      Hematocrit 43.6 %      MCV 78.7 fL      MCH 23.5 pg      MCHC 29.8 g/dL      RDW 17.2 %      RDW-SD 48.1 fl      MPV 9.4 fL      Platelets 332 10*3/mm3      Neutrophil % 71.0 %      Lymphocyte % 16.0 %      Monocyte % 11.3 %      Eosinophil % 0.9 %      Basophil % 0.5 %      Immature Grans % 0.3 %      Neutrophils, Absolute 4.13 10*3/mm3      Lymphocytes, Absolute 0.93 10*3/mm3      Monocytes, Absolute 0.66 10*3/mm3      Eosinophils, Absolute 0.05 10*3/mm3      Basophils, Absolute 0.03 10*3/mm3      Immature Grans, Absolute 0.02 10*3/mm3      nRBC 0.0 /100 WBC     Blood Culture - Blood, Hand, Left [[id number]] Collected: 01/16/22 1648     Specimen: Blood from Hand, Left Updated: 01/16/22 1705    Blood Culture - Blood, Arm, Left [[id number]] Collected: 01/16/22 1648    Specimen: Blood from Arm, Left Updated: 01/16/22 1705        Imaging Results (Last 24 Hours)     Procedure Component Value Units Date/Time    CT Angiogram Chest [[id number]] Collected: 01/16/22 2028     Updated: 01/16/22 2033    Narrative:      EXAMINATION: CT ANGIOGRAM CHEST-      1/16/2022 8:04 PM CST     HISTORY: PE suspected, low/intermediate prob, positive D-dimer     In order to have a CT radiation dose as low as reasonably achievable  Automated Exposure Control was utilized for adjustment of the mA and/or  KV according to patient size.     DLP in mGycm= 338.     CT angiogram chest.  CT angiography protocol.   CT imaging with bolus IV contrast injection.   Under concurrent supervision axial, sagittal, coronal, and  three-dimensional data sets were constructed.     Normal heart size.  Coronary artery calcification.  Thoracic aorta calcification.     Symmetric and normally opacified pulmonary arteries.  No pulmonary embolism.     Chronic interstitial lung disease.  Mild dependent atelectasis.  No pneumonia, pneumothorax, or pleural effusion.     Summary:  1. No pulmonary embolism.  2. Chronic lung changes.                                   This report was finalized on 01/16/2022 20:30 by Dr. López Tolentino MD.    XR Chest 1 View [[id number]] Collected: 01/16/22 1655     Updated: 01/16/22 1659    Narrative:      EXAMINATION: XR CHEST 1 VW-     1/16/2022 4:23 PM CST     HISTORY: Shortness of breath.     One view chest x-ray.     Comparison is made with January 8, 2022.     Heart size is magnified though there does appear to be mild  cardiomegaly.  Aortic arch calcification is present.     Chronic lung changes with scattered granulomas.     No focal consolidation.     No pneumothorax or heart failure.     Summary:  1. Stable chronic lung changes.     This report was finalized on  01/16/2022 16:56 by Dr. López Tolentino MD.        I have personally reviewed and interpreted the radiology studies and ECG obtained at time of admission.     Assessment / Plan     Assessment:   Active Hospital Problems    Diagnosis    • Hypoxia      Impression:  1. hypoxia, with chronic COPD  2. urinary tract infection  3. Microcytosis  4. hyperglycemia, with history of diabetes mellitus type 2.  Currently not on any oral medications  5. Hypertension  6. mild confusion versus dementia    Plan:   1. O2 support  2.  Levaquin  3.  Anemia profile  4.  A1c in the morning, to include morning labs  5.  Resume appropriate home medications  6.  Neurochecks  7.  Fall/skin/and aspiration precautions  8.  PT OT consult  9.  Gentle IV hydration  10.  The wound care to the right pretibial wound      Code Status/Advanced Care Plan: DNR, per prior record    The patient's surrogate decision maker is family.     I discussed my findings and recommendations with the patient.    Estimated length of stay is 1 to 2 days.     The patient was seen and examined by me on 1/16/2022 at 1045.    Electronically signed by Christine Foy DO, 01/16/22, 22:57 CST.

## 2022-01-17 NOTE — PROGRESS NOTES
[facility] Medicine Services  INPATIENT PROGRESS NOTE    Patient Name: Alissa Dominguez  Date of Admission: 1/16/2022  Today's Date: 01/17/22  Length of Stay: 0  Primary Care Physician: Pb Mitchell APRN    Subjective   Chief Complaint: Follow-up shortness of breath    HPI   Patient was just recently here hospitalized altered mental status which was felt to be related to hyponatremia and pain medications.  She did intermittently require a lot of oxygen during hospital stay.  She apparently was sent back from nursing home last night for shortness of breath and hypoxia.  Put on 2 L of oxygen in the ER and had sats in the mid 90s.  A CTA showed no acute disease.  There was also some concern for a UTI and she was started on antibiotics.    I evaluated the patient in 377 she was resting comfortably in bed.  She states she feels somewhat achy and tired.  She seems somewhat confused again however and just seems to be answering yes to all my questions.  Hard to really differentiate ROS.        Review of Systems   Difficult to fully assess due to patient factors    Objective    Temp:  [97.4 °F (36.3 °C)-98.9 °F (37.2 °C)] 97.5 °F (36.4 °C)  Heart Rate:  [] 101  Resp:  [16-22] 18  BP: (131-152)/(82-95) 131/82  Physical Exam  GEN: Awake, alert, interactive, in NAD  HEENT:  PERRLA, EOMI, Anicteric, Trachea midline  Lungs: no wheezing/rales/rhonchi  Heart: RRR, +S1/s2, no rub  ABD: soft, nt/nd, +BS, no guarding/rebound  Extremities:  no cyanosis, no edema  Skin: no petechiae  Neuro: AAO x2-3, BARRIENTOS, no focal deficits  Psych: normal mood & affect      Results Review:  I have reviewed the labs, radiology results, and diagnostic studies.    Laboratory Data:   Results from last 7 days   Lab Units 01/17/22  0541 01/16/22  1648 01/14/22  1600   WBC 10*3/mm3 6.25 5.82 6.13   HEMOGLOBIN g/dL 12.6 13.0 12.8   HEMATOCRIT % 41.6 43.6 42.4   PLATELETS 10*3/mm3 297 332 325        Results from last  7 days   Lab Units 01/17/22  0541 01/16/22  1648 01/14/22  1600   SODIUM mmol/L 143 143 137   POTASSIUM mmol/L 3.6 3.8 4.4   CHLORIDE mmol/L 103 104 99   CO2 mmol/L 28.0 28.0 25.0   BUN mg/dL 16 18 14   CREATININE mg/dL 0.57 0.71 0.59   CALCIUM mg/dL 8.7 9.4 9.8   BILIRUBIN mg/dL 0.7 0.6 0.5   ALK PHOS U/L 60 66 63   ALT (SGPT) U/L 12 11 11   AST (SGOT) U/L 21 19 16   GLUCOSE mg/dL 107* 169* 104*       Culture Data:   No results found for: BLOODCX, URINECX, WOUNDCX, MRSACX, RESPCX, STOOLCX    Radiology Data:   Imaging Results (Last 24 Hours)     Procedure Component Value Units Date/Time    CT Angiogram Chest [[id number]] Collected: 01/16/22 2028     Updated: 01/16/22 2033    Narrative:      EXAMINATION: CT ANGIOGRAM CHEST-      1/16/2022 8:04 PM CST     HISTORY: PE suspected, low/intermediate prob, positive D-dimer     In order to have a CT radiation dose as low as reasonably achievable  Automated Exposure Control was utilized for adjustment of the mA and/or  KV according to patient size.     DLP in mGycm= 338.     CT angiogram chest.  CT angiography protocol.   CT imaging with bolus IV contrast injection.   Under concurrent supervision axial, sagittal, coronal, and  three-dimensional data sets were constructed.     Normal heart size.  Coronary artery calcification.  Thoracic aorta calcification.     Symmetric and normally opacified pulmonary arteries.  No pulmonary embolism.     Chronic interstitial lung disease.  Mild dependent atelectasis.  No pneumonia, pneumothorax, or pleural effusion.     Summary:  1. No pulmonary embolism.  2. Chronic lung changes.                                   This report was finalized on 01/16/2022 20:30 by Dr. López Tolentino MD.    XR Chest 1 View [[id number]] Collected: 01/16/22 1655     Updated: 01/16/22 1659    Narrative:      EXAMINATION: XR CHEST 1 VW-     1/16/2022 4:23 PM CST     HISTORY: Shortness of breath.     One view chest x-ray.     Comparison is made with January 8, 2022.      Heart size is magnified though there does appear to be mild  cardiomegaly.  Aortic arch calcification is present.     Chronic lung changes with scattered granulomas.     No focal consolidation.     No pneumothorax or heart failure.     Summary:  1. Stable chronic lung changes.     This report was finalized on 01/16/2022 16:56 by Dr. López Tolentino MD.          I have reviewed the patient's current medications.     Assessment/Plan     Active Hospital Problems    Diagnosis    • **Acute cystitis    • Hypothyroidism    • Hypoxia    • Breast mass    • Essential (primary) hypertension        #1 acute UTI -she does somewhat seem a little more confused than she did at discharge but really otherwise overtly nontoxic.  Afebrile.  No white count elevation.  Normal renal function.  Was started on Levaquin by admitting provider.  Follow-up culture.    #2 shortness of breath/hypoxia -unclear etiology.  CTA is negative for PE or acute changes. May just chronically need O2.  Likely needs pulmonary follow-up after discharge.  No documented history of COPD.  Not on inhalers.    #3 essential hypertension -BP stable on home meds    #4 hypothyroidism -on Synthroid    #5 breast mass -post mastectomy and lymph node biopsy with drain that was removed last hospital stay.  Needs outpatient follow-up with surgery as planned prior    Discharge Planning: Patient admitted overnight for shortness of breath.  Now on oxygen.  No obvious and amenable disease.  Monitor closely.  Started on antibiotics for UTI and confusion.  Needs PT and OT eval to return to SNF.  If stable should be able to return back to SNF soon antibiotics for UTI with O2 as needed.    Electronically signed by Kenn Moore DO, 01/17/22, 10:15 CST.    ADDENDUM:  Prelim results from dopplers showing b/l LE dvt. Maybe patient had some small peripheral PE not seen on cta that is causing mild hypoxia. Regardless at this time will start on lovenox 1mg/kg bid. D/c scds ordered on  admit.     Electronically signed by Kenn Moore DO, 01/17/22, 6:28 PM CST.

## 2022-01-17 NOTE — PLAN OF CARE
Goal Outcome Evaluation:  Plan of Care Reviewed With: patient        Progress: no change  Outcome Summary: Pt admitted from ER to 3C this shift. Disoriented to place and situation. O2 at 2L NC. SOA with exertion. Up to BSC x2 assist. Voiding. IV fluids infusing. No c/o pain. VSS. Will continue to monitor.

## 2022-01-17 NOTE — CASE MANAGEMENT/SOCIAL WORK
Discharge Planning Assessment  [facility]     Patient Name: Alissa Dominguez  MRN: [id number]  Today's Date: 1/17/2022    Admit Date: 1/16/2022     Discharge Needs Assessment     Row Name 01/17/22 1343       Living Environment    Lives With facility resident    Current Living Arrangements extended care facility    Quality of Family Relationships helpful; involved; supportive    Able to Return to Prior Arrangements yes       Transition Planning    Patient/Family Anticipates Transition to inpatient rehabilitation facility    Patient/Family Anticipated Services at Transition skilled nursing       Discharge Needs Assessment    Current Outpatient/Agency/Support Group skilled nursing facility    Concerns to be Addressed no discharge needs identified    Outpatient/Agency/Support Group Needs skilled nursing facility    Discharge Facility/Level of Care Needs nursing facility, skilled    Discharge Coordination/Progress Pt is from [facility] [phone]. Spoke with her daughter, Kassidy [phone], and she is concerned pt will lose her bed. Spoke with Saida [phone] from [facility] and she cannot guarantee pt will be able to keep the same bed unless they privately pay to hold it. She will require therapy evaluations to return to the facility so insurance will approve. Therapy has been ordered.               Discharge Plan    No documentation.               Continued Care and Services - Admitted Since 1/16/2022    Coordination has not been started for this encounter.     Selected Continued Care - Prior Encounters Includes selections from prior encounters from 10/18/2021 to 1/17/2022    Discharged on 1/13/2022 Admission date: 1/8/2022 - Discharge disposition: Skilled Nursing Facility (DC - External)    Destination     Service Provider Selected Services Address Phone Fax Patient Preferred    [facility]  Skilled Nursing [address], [facility] [address] [phone] --                Discharged  on 12/4/2021 Admission date: 11/30/2021 - Discharge disposition: Skilled Nursing Facility (DC - External)    Destination     Service Provider Selected Services Address Phone Fax Patient Preferred    [facility] [address] [place] [address] [phone] --                       Demographic Summary    No documentation.                Functional Status    No documentation.                Psychosocial    No documentation.                Abuse/Neglect    No documentation.                Legal    No documentation.                Substance Abuse    No documentation.                Patient Forms    No documentation.                   JERMAINE Del Castillo

## 2022-01-18 PROBLEM — R41.82 ALTERED MENTAL STATUS: Status: RESOLVED | Noted: 2022-01-01 | Resolved: 2022-01-01

## 2022-01-18 PROBLEM — J44.1 COPD EXACERBATION (HCC): Status: ACTIVE | Noted: 2022-01-01

## 2022-01-18 PROBLEM — N64.89 HEMATOMA (NONTRAUMATIC) OF BREAST: Status: RESOLVED | Noted: 2021-01-01 | Resolved: 2022-01-01

## 2022-01-18 NOTE — PROGRESS NOTES
[facility] Medicine Services  INPATIENT PROGRESS NOTE    Length of Stay: 0  Date of Admission: 1/16/2022  Primary Care Physician: bP Mitchell APRN    Subjective     Chief Complaint:     Shortness of breath    HPI     The patient is more alert.  Antibiotics can be changed to oral Levaquin at 250 mg p.o. daily.  The patient is tolerating oral intake so IV fluids can be discontinued.  PT/OT have been consulted and recommend the patient return to SNF for further rehab.  The patient will require oxygen supplementation at discharge.  Urine cultures positive for E. coli sensitive to Levaquin.  White blood cell count within normal limits.  Hemoglobin unremarkable.  CMP today unremarkable.  Patient is stable for discharge to SNF when a bed is available.    Review of Systems     All pertinent negatives and positives are as above. All other systems have been reviewed and are negative unless otherwise stated.     Objective    Temp:  [97.3 °F (36.3 °C)-98.5 °F (36.9 °C)] 97.3 °F (36.3 °C)  Heart Rate:  [67-86] 86  Resp:  [16-18] 16  BP: (121-163)/(80-89) 143/88    Lab Results (last 24 hours)     Procedure Component Value Units Date/Time    Blood Culture - Blood, Arm, Left [[id number]]  (Normal) Collected: 01/16/22 1648    Specimen: Blood from Arm, Left Updated: 01/18/22 1715     Blood Culture No growth at 2 days    Blood Culture - Blood, Hand, Left [[id number]]  (Normal) Collected: 01/16/22 1648    Specimen: Blood from Hand, Left Updated: 01/18/22 1715     Blood Culture No growth at 2 days    Urine Culture - Urine, Urine, Clean Catch [[id number]]  (Abnormal)  (Susceptibility) Collected: 01/16/22 1907    Specimen: Urine, Clean Catch Updated: 01/18/22 0811     Urine Culture >100,000 CFU/mL Escherichia coli    Susceptibility      Escherichia coli     GINA     Ampicillin Intermediate     Ampicillin + Sulbactam Susceptible     Cefazolin Susceptible     Cefepime Susceptible     Ceftazidime  Susceptible     Ceftriaxone Susceptible     Gentamicin Susceptible     Levofloxacin Resistant     Nitrofurantoin Susceptible     Piperacillin + Tazobactam Susceptible     Trimethoprim + Sulfamethoxazole Susceptible                  Linear View                         Imaging Results (Last 24 Hours)     Procedure Component Value Units Date/Time    US Venous Doppler Lower Extremity Bilateral (duplex) [[id number]] Collected: 01/18/22 1605     Updated: 01/18/22 1609    Narrative:      History: Swelling       Impression:      Impression: There is evidence of deep venous thrombosis in bilateral  lower extremities.     Comments: Bilateral lower extremity venous duplex exam was performed  using color Doppler flow, Doppler waveform analysis, and grayscale  imaging, with and without compression. There is evidence of nonocclusive  deep venous thrombosis in the popliteal vein in the right lower  extremity. There is also evidence of deep venous thrombosis in the  posterior tibial vein in the left lower extremity. No thrombus is  identified in the saphenofemoral junctions and greater saphenous veins  bilaterally.         This report was finalized on 01/18/2022 16:06 by Dr. Kwame Corado MD.             Intake/Output Summary (Last 24 hours) at 1/18/2022 6778  Last data filed at 1/17/2022 1947  Gross per 24 hour   Intake 1000 ml   Output --   Net 1000 ml       Physical Exam  Constitutional:       Appearance: Normal appearance.   HENT:      Head: Normocephalic and atraumatic.      Right Ear: External ear normal.      Left Ear: External ear normal.      Nose: Nose normal.      Mouth/Throat:      Mouth: Mucous membranes are moist.      Pharynx: Oropharynx is clear.   Eyes:      Conjunctiva/sclera: Conjunctivae normal.   Cardiovascular:      Rate and Rhythm: Regular rhythm.      Pulses: Normal pulses.      Heart sounds: Normal heart sounds.   Pulmonary:      Effort: Pulmonary effort is normal. No respiratory distress.      Breath  sounds: Normal breath sounds.   Abdominal:      General: Abdomen is flat. Bowel sounds are normal.      Palpations: Abdomen is soft. There is no mass.   Musculoskeletal:      Right lower leg: Edema present.      Left lower leg: Edema present.   Skin:     General: Skin is warm and dry.      Coloration: Skin is not pale.   Neurological:      General: No focal deficit present.      Mental Status: She is alert and oriented to person, place, and time. Mental status is at baseline.   Psychiatric:         Mood and Affect: Mood normal.           Results Review:  I have reviewed the labs, radiology results, and diagnostic studies since my last progress note and made treatment changes reflective of the results.   I have reviewed the current medications.    Assessment/Plan     Active Hospital Problems    Diagnosis    • **COPD exacerbation (HCC)    • Acute cystitis    • Hypothyroidism    • Breast mass    • Essential (primary) hypertension        PLAN:  Oxygen supplementation discharge  Discontinue IV Levaquin in favor of oral Levaquin  Urine cultures note nearly pansensitive E. Coli  No further dyspnea  Return to skilled nursing facility when bed is available and insurance precertification has been obtained    Electronically signed by Damián Briceño DO, 01/18/22, 17:19 CST.

## 2022-01-18 NOTE — CASE MANAGEMENT/SOCIAL WORK
Continued Stay Note   [facility]     Patient Name: Alissa Dominguez  MRN: [id number]  Today's Date: 1/18/2022    Admit Date: 1/16/2022     Discharge Plan     Row Name 01/18/22 1444       Plan    Plan [facility] pending insurance approval    Plan Comments PT has evaluated pt. Waiting on OT evaluation. [facility] will take pt back once insurance approves.               Discharge Codes    No documentation.                     JERMAINE Del Castillo

## 2022-01-18 NOTE — PLAN OF CARE
Goal Outcome Evaluation:  Plan of Care Reviewed With: patient        Progress: improving  Outcome Summary: OT eval completed. Pt AxO x4 but demos conversational confusion. Pt c/o tiredness and knee pain throughout eval. Pt requires modA with supine to sit and Jasmeet with sit to supine bed mobility with vc. Pt HR increased from 60s to 150s during sitting activity and returned back to 80 at rest. Nsg was notified and reported she would monitor and evaluate the situation throughout day. Pt requires maxA to don socks at EOB. Pt amb from bed<>BR with CGA x2, vc, and front-wheeled walker. Pt requires Jasmeet to CGA x2 and vc with all sit<>stand transfers. During toilet transfer pt uses grab bars for sit<>stand and requires Jasmeet to CGA x2 and vc for positioning on toilet and gripping toilet paper. Pt demos a decreased sense of safety awareness throughout eval. Pt would benefit from skilled OT services to increase functional endurance and safety awareness and to increase independence in ADLs. Recommended d/c SNF.

## 2022-01-18 NOTE — THERAPY EVALUATION
Patient Name: Alissa Dominguez  : 1945    MRN: [id number]                              Today's Date: 2022       Admit Date: 2022    Visit Dx:     ICD-10-CM ICD-9-CM   1. Hypoxia  R09.02 799.02   2. Urinary tract infection without hematuria, site unspecified  N39.0 599.0   3. Impaired functional mobility and activity tolerance  Z74.09 V49.89     Patient Active Problem List   Diagnosis   • Essential (primary) hypertension   • Breast mass   • Hypoxia   • Acute cystitis   • Hypothyroidism   • COPD (chronic obstructive pulmonary disease) ([facility])     Past Medical History:   Diagnosis Date   • Cataracts, bilateral    • Cellulitis of left lower limb    • Cellulitis of right lower limb    • COPD (chronic obstructive pulmonary disease) ([facility])    • DJD (degenerative joint disease), cervical     DJD C Spine/Spondylolysis, Cervical Region   • Essential (primary) hypertension    • GERD (gastroesophageal reflux disease)    • Hematoma     Left mastectomy site   • Hypothyroidism    • Major depressive disorder, recurrent, moderate ([facility])    • Malignant neoplasm of left breast ([facility])    • 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, estrogen receptor positive ([facility]) 2021   • Migraine    • Morbid (severe) obesity due to excess calories ([facility])    • Obstructive sleep apnea    • PONV (postoperative nausea and vomiting)    • Primary generalized (osteo)arthritis    • Pure hypercholesterolemia    • Tinea unguium    • Type 2 diabetes mellitus with hyperglycemia ([facility])    • Venous insufficiency (chronic) (peripheral)      Past Surgical History:   Procedure Laterality Date   • BREAST ABSCESS INCISION AND DRAINAGE Left 2021    Procedure: EVACUATION OF LEFT MASTECTOMY HEMATOMA;  Surgeon: Naye Tavarez MD;  Location: [facility];  Service: General;  Laterality: Left;   • BREAST SURGERY Left     Left mastectomy   • CATARACT EXTRACTION, BILATERAL     • CATARACT EXTRACTION, BILATERAL     • ESSURE TUBAL  LIGATION     • HYSTERECTOMY     • MASTECTOMY W/ SENTINEL NODE BIOPSY Left 11/30/2021    Procedure: LEFT MASTECTOMY WITH SENTINEL LYMPH NODE BIOPSY WITH MAGTRACE;  Surgeon: Naye Tavarez MD;  Location: [facility];  Service: General;  Laterality: Left;   • VARICOSE VEIN SURGERY Right       General Information     Row Name 01/18/22 0823          Physical Therapy Time and Intention    Document Type evaluation; other (see comments)  see MAR  -     Mode of Treatment physical therapy  -     Row Name 01/18/22 0823          General Information    Patient Profile Reviewed yes  -     Prior Level of Function --  reports she normally uses a walker; unclear of how much assist required at nursing facility prior to this admission; in/out of hospital/SNF since Dec 2021  -     Existing Precautions/Restrictions fall; oxygen therapy device and L/min  -     Barriers to Rehab medically complex; cognitive status  -     Row Name 01/18/22 0823          Living Environment    Lives With facility resident  -     Row Name 01/18/22 0823          Home Main Entrance    Number of Stairs, Main Entrance none  -     Row Name 01/18/22 0823          Stairs Within Home, Primary    Number of Stairs, Within Home, Primary none  -     Row Name 01/18/22 0823          Cognition    Orientation Status (Cognition) oriented x 4  -     Row Name 01/18/22 0823          Safety Issues, Functional Mobility    Safety Issues Affecting Function (Mobility) friction/shear risk; safety precaution awareness; safety precautions follow-through/compliance; insight into deficits/self-awareness  -     Impairments Affecting Function (Mobility) balance; cognition; endurance/activity tolerance; pain; sensation/sensory awareness; shortness of breath; strength  -     Cognitive Impairments, Mobility Safety/Performance safety precaution awareness; safety precaution follow-through; insight into deficits/self-awareness  -     Row Name 01/18/22 0823           Relationship/Environment    Name(s) of Who Lives With Patient has been in a SNF since after surgery in December 2021  -           User Key  (r) = Recorded By, (t) = Taken By, (c) = Cosigned By    Initials Name Provider Type    Maira Ochoa, PT Physical Therapist               Mobility     Row Name 01/18/22 0823          Bed Mobility    Bed Mobility supine-sit; sit-supine  -     Supine-Sit [place] (Bed Mobility) moderate assist (50% patient effort); verbal cues  -     Sit-Supine [place] (Bed Mobility) minimum assist (75% patient effort); verbal cues  -     Assistive Device (Bed Mobility) bed rails; head of bed elevated  -     Comment (Bed Mobility) increase time and effort to complete task; initially w/ posterior lean upon sitting up on side of bed  -     Row Name 01/18/22 0823          Transfers    Comment (Transfers) on/off toilet w/ use of grab bars w/ min to CGA 2 w/ VCs  -     Row Name 01/18/22 0823          Sit-Stand Transfer    Sit-Stand [place] (Transfers) minimum assist (75% patient effort); contact guard; 2 person assist; verbal cues  -     Assistive Device (Sit-Stand Transfers) walker, front-wheeled  -     Row Name 01/18/22 0823          Gait/Stairs (Locomotion)    [place] Level (Gait) contact guard; verbal cues; 1 person assist; 1 person to manage equipment  -     Assistive Device (Gait) walker, front-wheeled  -     Distance in Feet (Gait) ~ 15 ft twice  -     Deviations/Abnormal Patterns (Gait) gait speed decreased; stride length decreased  -     Bilateral Gait Deviations forward flexed posture; heel strike decreased  -     Comment (Gait/Stairs) demonstrates flex forward head, rounded shlds, flexed spine  -           User Key  (r) = Recorded By, (t) = Taken By, (c) = Cosigned By    Initials Name Provider Type    Maira Ochoa, PT Physical Therapist               Obj/Interventions     Row Name 01/18/22 0823          Range of Motion  Comprehensive    General Range of Motion no range of motion deficits identified  -     Row Name 01/18/22 0823          Strength Comprehensive (MMT)    Comment, General Manual Muscle Testing (MMT) Assessment defer to OT for formal UE strength assessment, LEs grossly 4+/5 except R hip flexor 3/5, L hip flexor 4- to 3+/5  -     Row Name 01/18/22 0823          Balance    Balance Assessment sitting static balance; sitting dynamic balance; standing static balance; standing dynamic balance  -     Static Sitting Balance mild impairment; supported; unsupported; sitting, edge of bed  -     Dynamic Sitting Balance mild impairment; supported; unsupported; sitting, edge of bed  -     Static Standing Balance mild impairment; supported; standing  -     Dynamic Standing Balance mild impairment; supported; standing  -     Comment, Balance initially w/ posterior lean upon sitting edge of bed; noted posterior lean w/ U/LE activity in sitting  -[facility] Name 01/18/22 0823          Sensory Assessment (Somatosensory)    Sensory Assessment (Somatosensory) other (see comments)  no reported c/os n/t  -           User Key  (r) = Recorded By, (t) = Taken By, (c) = Cosigned By    Initials Name Provider Type    Maira Ochoa, PT Physical Therapist               Goals/Plan     [facility] Name 01/18/22 0823          Bed Mobility Goal 1 (PT)    Activity/Assistive Device (Bed Mobility Goal 1, PT) bed mobility activities, all  -     [place] Level/Cues Needed (Bed Mobility Goal 1, PT) standby assist; modified independence  -     Time Frame (Bed Mobility Goal 1, PT) long term goal (LTG); 10 days  -     Progress/Outcomes (Bed Mobility Goal 1, PT) goal ongoing  -     Row Name 01/18/22 0823          Transfer Goal 1 (PT)    Activity/Assistive Device (Transfer Goal 1, PT) sit-to-stand/stand-to-sit; bed-to-chair/chair-to-bed; walker, rolling  -     [place] Level/Cues Needed (Transfer Goal 1, PT) standby assist  -      "Time Frame (Transfer Goal 1, PT) long term goal (LTG); 10 days  -JE     Progress/Outcome (Transfer Goal 1, PT) goal ongoing  -     Row Name 01/18/22 0823          Gait Training Goal 1 (PT)    Activity/Assistive Device (Gait Training Goal 1, PT) gait (walking locomotion); assistive device use; decrease fall risk; improve balance and speed; increase endurance/gait distance; increase energy conservation; walker, rolling  -     [place] Level (Gait Training Goal 1, PT) standby assist  -JE     Distance (Gait Training Goal 1, PT)  ft w/ less than or equal to 2 standing rest  -     Time Frame (Gait Training Goal 1, PT) long term goal (LTG); 10 days  -     Progress/Outcome (Gait Training Goal 1, PT) goal ongoing  -           User Key  (r) = Recorded By, (t) = Taken By, (c) = Cosigned By    Initials Name Provider Type    Maira Ochoa, PT Physical Therapist               Clinical Impression     Row Name 01/18/22 0823          Pain    Additional Documentation Pain Scale: Numbers Pre/Post-Treatment (Group)  -     Row Name 01/18/22 0823          Pain Scale: Numbers Pre/Post-Treatment    Pretreatment Pain Rating 6/10  -     Posttreatment Pain Rating 0/10 - no pain  -     Pain Location - Side Bilateral  -     Pre/Posttreatment Pain Comment legs and knees; reported no pain upon return to bed  -     Pain Intervention(s) Ambulation/increased activity; Repositioned; Rest  -     Row Name 01/18/22 0823          Plan of Care Review    Plan of Care Reviewed With patient  -     Progress no change  -     Outcome Summary PT eval completed.  Pt oriented X 4, however conversationally confused.  Pt demonstrates generalized weakness and decrease balance w/ increase fall risk.  Pt reports \"not feeling well at all.\"   Pt required increase assist of 1 to sit edge of bed and upon sitting up reported she could not breath at all.  SPO2 92%.  HR initiialy 60 bpm, however w/ sitting activity, HR increased to " "150s before returning to 80 w/ rest.  Pt performed gait activity to/from the bathroom ~ 15 ft twice w/ rwx, assist of 1 and assist of another for equipment.  Performed tfer on/off the toilet w/ assist of 2.  Pt returned to bed w/ assist of 1.  Pt w/ continued elevated HR at end of visit, however SPO2 remained in low 90s.  Pt did require 2 L O2 throughout  visit.  RN notified of pt's elevated HR and advised she would continue to evaluate the situation.  Pt reported no pain at end of visit, just \"exhaustion.\"  Pt will benefit from continued PT services to improve activity tolerance, overall strength, balance, safety awareness, and I w/ functional mobility reducing fall risk.  Recommend continued skilled care at discharge w/ return to SNF.  Will follow for progress and additional needs.  -     Row Name 01/18/22 0823          Therapy Assessment/Plan (PT)    Patient/Family Therapy Goals Statement (PT) improve strength and mobility, increase tolerance to activity  -     Rehab Potential (PT) fair, will monitor progress closely  -     Criteria for Skilled Interventions Met (PT) yes; meets criteria; skilled treatment is necessary  -     Predicted Duration of Therapy Intervention (PT) until discharge or goals achieved  -     Row Name 01/18/22 0823          Vital Signs    Pretreatment Heart Rate (beats/min) 60  -     Intratreatment Heart Rate (beats/min) 152  returned to 80 w/ sitting rest  -     Posttreatment Heart Rate (beats/min) 154  decreased to 120s prior to leaving room; RN notified and aware  -     O2 Delivery Pre Treatment nasal cannula  -     O2 Delivery Intra Treatment nasal cannula  -     O2 Delivery Post Treatment nasal cannula  -     Pre Patient Position Supine  -     Intra Patient Position Standing  -     Post Patient Position Supine  -     Row Name 01/18/22 0823          Positioning and Restraints    Pre-Treatment Position in bed  -JE     Post Treatment Position bed  -JE     In Bed " notified nsg; fowlers; call light within reach; encouraged to call for assist; exit alarm on; side rails up x2  -           User Key  (r) = Recorded By, (t) = Taken By, (c) = Cosigned By    Initials Name Provider Type    Maira Ochoa, PT Physical Therapist               Outcome Measures     Row Name 01/18/22 0823          How much help from another person do you currently need...    Turning from your back to your side while in flat bed without using bedrails? 3  -JE     Moving from lying on back to sitting on the side of a flat bed without bedrails? 3  -JE     Moving to and from a bed to a chair (including a wheelchair)? 3  -JE     Standing up from a chair using your arms (e.g., wheelchair, bedside chair)? 3  -JE     Climbing 3-5 steps with a railing? 3  -JE     To walk in hospital room? 3  -     AM-PAC 6 Clicks Score (PT) 18  -     Row Name 01/18/22 0823          Functional Assessment    Outcome Measure Options AM-PAC 6 Clicks Basic Mobility (PT)  -           User Key  (r) = Recorded By, (t) = Taken By, (c) = Cosigned By    Initials Name Provider Type    Maira Ochoa, PT Physical Therapist                             Physical Therapy Education                 Title: PT OT SLP Therapies (In Progress)     Topic: Physical Therapy (In Progress)     Point: Mobility training (Done)     Learning Progress Summary           Patient Acceptance, E,TB,D, VU,DU,NR by VARUN at 1/18/2022 0929    Comment: Education re: purpose of PT/importance of activity, for improved tech w/ bed mobility, tfers and gait w/ rwx w/ emphasis on safety/falls prevention, proper breathing tech and pacing activity                   Point: Home exercise program (Not Started)     Learner Progress:  Not documented in this visit.          Point: Precautions (Done)     Learning Progress Summary           Patient Acceptance, E,TB,D, VU,DU,NR by VARUN at 1/18/2022 0929    Comment: Education re: purpose of PT/importance of activity, for  "improved tech w/ bed mobility, tfers and gait w/ rwx w/ emphasis on safety/falls prevention, proper breathing tech and pacing activity                               User Key     Initials Effective Dates Name Provider Type Discipline     08/02/18 -  Maira Dominguez, PT Physical Therapist PT              PT Recommendation and Plan  Planned Therapy Interventions (PT): balance training, bed mobility training, gait training, patient/family education, postural re-education, ROM (range of motion), strengthening, transfer training, other (see comments) (safety/falls prevention, pain mgmt)  Plan of Care Reviewed With: patient  Progress: no change  Outcome Summary: PT eval completed.  Pt oriented X 4, however conversationally confused.  Pt demonstrates generalized weakness and decrease balance w/ increase fall risk.  Pt reports \"not feeling well at all.\"   Pt required increase assist of 1 to sit edge of bed and upon sitting up reported she could not breath at all.  SPO2 92%.  HR initiialy 60 bpm, however w/ sitting activity, HR increased to 150s before returning to 80 w/ rest.  Pt performed gait activity to/from the bathroom ~ 15 ft twice w/ rwx, assist of 1 and assist of another for equipment.  Performed tfer on/off the toilet w/ assist of 2.  Pt returned to bed w/ assist of 1.  Pt w/ continued elevated HR at end of visit, however SPO2 remained in low 90s.  Pt did require 2 L O2 throughout  visit.  RN notified of pt's elevated HR and advised she would continue to evaluate the situation.  Pt reported no pain at end of visit, just \"exhaustion.\"  Pt will benefit from continued PT services to improve activity tolerance, overall strength, balance, safety awareness, and I w/ functional mobility reducing fall risk.  Recommend continued skilled care at discharge w/ return to SNF.  Will follow for progress and additional needs.     Time Calculation:    PT Charges     Row Name 01/18/22 0930             Time Calculation    Start " Time 0823  -      Stop Time 0919  -      Time Calculation (min) 56 min  -      PT Received On 01/18/22  -      PT Goal Re-Cert Due Date 01/28/22  -            User Key  (r) = Recorded By, (t) = Taken By, (c) = Cosigned By    Initials Name Provider Type    Maira Ochoa, PT Physical Therapist              Therapy Charges for Today     Code Description Service Date Service Provider Modifiers Qty    95490003026 HC PT EVAL MOD COMPLEXITY 4 1/18/2022 Maira Dominguez, PT GP 1          PT G-Codes  Outcome Measure Options: AM-PAC 6 Clicks Basic Mobility (PT)  AM-PAC 6 Clicks Score (PT): 18    Maira Dominguez, PT  1/18/2022

## 2022-01-18 NOTE — PLAN OF CARE
Goal Outcome Evaluation:           Progress: no change  Outcome Summary: Patient more alert, voiding incontinent, c/o pain see MAR, up with PT/OT up with 1 and walker to BR, IVF to IID, antibiotics to PO, tolerating diet, safety maintained.

## 2022-01-18 NOTE — PLAN OF CARE
Goal Outcome Evaluation:  Plan of Care Reviewed With: patient        Progress: no change  Outcome Summary: VENOUS SCAN SHOWED DVT'S. DR. HE AWARE. LOVENOX ORDERED. PT. UP TO BSC WITH ASSISTANCE. NO C/O'S PAIN VOICED. CONT. PULSE OX INTACT. NO DISTRESS NOTED.

## 2022-01-18 NOTE — PLAN OF CARE
"Goal Outcome Evaluation:  Plan of Care Reviewed With: patient        Progress: no change  Outcome Summary: PT eval completed.  Pt oriented X 4, however conversationally confused.  Pt demonstrates generalized weakness and decrease balance w/ increase fall risk.  Pt reports \"not feeling well at all.\"   Pt required increase assist of 1 to sit edge of bed and upon sitting up reported she could not breath at all.  SPO2 92%.  HR initiialy 60 bpm, however w/ sitting activity, HR increased to 150s before returning to 80 w/ rest.  Pt performed gait activity to/from the bathroom ~ 15 ft twice w/ rwx, assist of 1 and assist of another for equipment.  Performed tfer on/off the toilet w/ assist of 2.  Pt returned to bed w/ assist of 1.  Pt w/ continued elevated HR at end of visit, however SPO2 remained in low 90s.  Pt did require 2 L O2 throughout  visit.  RN notified of pt's elevated HR and advised she would continue to evaluate the situation.  Pt reported no pain at end of visit, just \"exhaustion.\"  Pt will benefit from continued PT services to improve activity tolerance, overall strength, balance, safety awareness, and I w/ functional mobility reducing fall risk.  Recommend continued skilled care at discharge w/ return to SNF.  Will follow for progress and additional needs.  "

## 2022-01-18 NOTE — THERAPY EVALUATION
Patient Name: Alissa Dominguez  : 1945    MRN: [id number]                              Today's Date: 2022       Admit Date: 2022    Visit Dx:     ICD-10-CM ICD-9-CM   1. Hypoxia  R09.02 799.02   2. Urinary tract infection without hematuria, site unspecified  N39.0 599.0   3. Impaired functional mobility and activity tolerance  Z74.09 V49.89   4. Decreased activities of daily living (ADL)  Z78.9 V49.89     Patient Active Problem List   Diagnosis   • Essential (primary) hypertension   • Breast mass   • Hypoxia   • Acute cystitis   • Hypothyroidism   • COPD (chronic obstructive pulmonary disease) ([facility])     Past Medical History:   Diagnosis Date   • Cataracts, bilateral    • Cellulitis of left lower limb    • Cellulitis of right lower limb    • COPD (chronic obstructive pulmonary disease) ([facility])    • DJD (degenerative joint disease), cervical     DJD C Spine/Spondylolysis, Cervical Region   • Essential (primary) hypertension    • GERD (gastroesophageal reflux disease)    • Hematoma     Left mastectomy site   • Hypothyroidism    • Major depressive disorder, recurrent, moderate ([facility])    • Malignant neoplasm of left breast ([facility])    • 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, estrogen receptor positive ([facility]) 2021   • Migraine    • Morbid (severe) obesity due to excess calories ([facility])    • Obstructive sleep apnea    • PONV (postoperative nausea and vomiting)    • Primary generalized (osteo)arthritis    • Pure hypercholesterolemia    • Tinea unguium    • Type 2 diabetes mellitus with hyperglycemia ([facility])    • Venous insufficiency (chronic) (peripheral)      Past Surgical History:   Procedure Laterality Date   • BREAST ABSCESS INCISION AND DRAINAGE Left 2021    Procedure: EVACUATION OF LEFT MASTECTOMY HEMATOMA;  Surgeon: Naye Tavarez MD;  Location: [facility];  Service: General;  Laterality: Left;   • BREAST SURGERY Left     Left mastectomy   • CATARACT EXTRACTION,  BILATERAL     • CATARACT EXTRACTION, BILATERAL     • ESSURE TUBAL LIGATION     • HYSTERECTOMY     • MASTECTOMY W/ SENTINEL NODE BIOPSY Left 11/30/2021    Procedure: LEFT MASTECTOMY WITH SENTINEL LYMPH NODE BIOPSY WITH MAGTRACE;  Surgeon: Naye Tavarez MD;  Location: [facility] OR;  Service: General;  Laterality: Left;   • VARICOSE VEIN SURGERY Right       General Information     Row Name 01/18/22 0811          OT Time and Intention    Document Type evaluation  -MARIA DEL CARMEN (r)  (t) MARIA DEL CARMEN (c)     Mode of Treatment occupational therapy  -MARIA DEL CARMEN (r)  (t) JJ (c)     Row Name 01/18/22 0811          General Information    Patient Profile Reviewed yes  -MARIA DEL CARMEN (r)  (t) JJ (c)     Prior Level of Function --  reports using walker, unclear of how much assist is needed in nursing home prior to admission. Has been in/out of hospital and SNF since Dec 2021. Pt demos conversational confusion.  -MARIA DEL CARMEN (r)  (t) JJ (c)     Existing Precautions/Restrictions fall; oxygen therapy device and L/min  -MARIA DEL CARMEN (r)  (t) JJ (c)     Barriers to Rehab medically complex; cognitive status  -MARIA DEL CARMEN (r)  (t) JJ (c)     Row Name 01/18/22 0811          Occupational Profile    Reason for Services/Referral (Occupational Profile) presents at ED w back pain and confusion, venous scan showed DVTs, dx hypoxia  -[place] (r)  (t) JJ (c)     Row Name 01/18/22 0811          Living Environment    Lives With facility resident  -MARIA DEL CARMEN (r)  (t) JJ (c)     Row Name 01/18/22 0811          Home Main Entrance    Number of Stairs, Main Entrance none  -JJ (r)  (t) JJ (c)     Row Name 01/18/22 0811          Stairs Within Home, Primary    Number of Stairs, Within Home, Primary none  -JJ (r) JR (t) JJ (c)     Row Name 01/18/22 0811          Cognition    Orientation Status (Cognition) oriented x 4  -JJ (r)  (t) JJ (c)     Row Name 01/18/22 0811          Safety Issues, Functional Mobility    Safety Issues Affecting Function (Mobility) awareness of need for assistance; insight into  deficits/self-awareness; judgment; friction/shear risk; safety precaution awareness; safety precautions follow-through/compliance  -MARIA DEL CARMEN (r)  (t) MARIA DEL CARMEN (c)     Impairments Affecting Function (Mobility) balance; cognition; endurance/activity tolerance; pain; sensation/sensory awareness; shortness of breath; strength  -MARIA DEL CARMEN (r)  (t) MARIA DEL CARMEN (c)     Cognitive Impairments, Mobility Safety/Performance insight into deficits/self-awareness; safety precaution awareness; safety precaution follow-through  -MARIA DEL CARMEN (r)  (t) MARIA DEL CARMEN (c)     Row Name 01/18/22 0811          Relationship/Environment    Name(s) of Who Lives With Patient Pt has been in SNF since post surgery in Dec 2021  -MARIA DEL CARMEN (r)  (t) MARIA DEL CARMEN (c)           User Key  (r) = Recorded By, (t) = Taken By, (c) = Cosigned By    Initials Name Provider Type    Katherin Bañuelos, OTR/L, CSRS Occupational Therapist    Nia Pedro, OT Student OT Student                 Mobility/ADL's     Row Name 01/18/22 0811          Bed Mobility    Bed Mobility supine-sit; sit-supine  -MARIA DEL CARMEN (r)  (t) MARIA DEL CARMEN (c)     Supine-Sit [place] (Bed Mobility) moderate assist (50% patient effort); verbal cues  -MARIA DEL CARMEN (r)  (t) MARIA DEL CARMEN (c)     Sit-Supine [place] (Bed Mobility) minimum assist (75% patient effort); verbal cues  -MARIA DEL CRAMEN (r)  (t) MARIA DEL CARMEN (c)     Assistive Device (Bed Mobility) bed rails; head of bed elevated  -MARIA DEL CARMEN (r)  (t) MARIA DEL CARMEN (c)     Comment (Bed Mobility) pt requires increased time and effort to complete tasks.  -MARIA DEL CARMEN (r)  (t) MARIA DEL CARMEN (c)     Row Name 01/18/22 0811          Transfers    Transfers sit-stand transfer; toilet transfer  -MARIA DEL CARMEN (r)  (t) MARIA DEL CARMEN (c)     Sit-Stand [place] (Transfers) minimum assist (75% patient effort); contact guard; 2 person assist; verbal cues  -MARIA DEL CARMEN (r)  (t) MARIA DEL CARMEN (c)     [place] Level (Toilet Transfer) minimum assist (75% patient effort); verbal cues; contact guard; 2 person assist  -MARIA DEL CARMEN (r)  (t) MARIA DEL CARMEN (c)     Assistive Device (Toilet Transfer) commode; walker, front-wheeled; grab  bars/safety frame  -JJ (r) JR (t) JJ (c)     Row Name 01/18/22 08          Sit-Stand Transfer    Assistive Device (Sit-Stand Transfers) walker, front-wheeled  -JJ (r)  (t) JJ (c)     Row Name 01/18/22 [facility]          Toilet Transfer    Type (Toilet Transfer) sit-stand; stand-sit  -JJ (r)  (t) JJ (c)     Row Name 01/18/22 [facility]          Functional Mobility    Functional Mobility- Ind. Level contact guard assist  -JJ (r) JR (t) JJ (c)     Functional Mobility- Device rolling platform walker  -JJ (r) JR (t) JJ (c)     Row Name 01/18/22 [facility]          Activities of Daily Living    BADL Assessment/Intervention lower body dressing; toileting; feeding  -JJ (r)  (t) JJ (c)     Row Name 01/18/22 [facility]          Lower Body Dressing Assessment/Training    [place] Level (Lower Body Dressing) don; socks; maximum assist (25% patient effort)  -JJ (r) JR (t) JJ (c)     Position (Lower Body Dressing) edge of bed sitting  -JJ (r) JR (t) JJ (c)     Row Name 01/18/22 [facility]          Toileting Assessment/Training    [place] Level (Toileting) minimum assist (75% patient effort); verbal cues; contact guard assist  -JJ (r) JR (t) JJ (c)     Assistive Devices (Toileting) commode; grab bar/safety frame  -JJ (r) JR (t) JJ (c)     Position (Toileting) supported sitting  -JJ (r) JR (t) JJ (c)     Comment (Toileting) sit<>stand toilet and uses grab bars w/ min to CGA x2 and vc  -JJ (r) JR (t) JJ (c)     Row Name 01/18/22 [facility]          Self-Feeding Assessment/Training    [place] Level (Feeding) independent; finger foods; liquids to mouth  -JJ (r) JR (t) JJ (c)     Position (Self-Feeding) sitting up in bed  -JJ (r) JR (t) JJ (c)           User Key  (r) = Recorded By, (t) = Taken By, (c) = Cosigned By    Initials Name Provider Type    Katherin Bañuelos, OTR/L, CSRS Occupational Therapist    Nia Pedro, OT Student OT Student               Obj/Interventions     Row Name 01/18/22 0811          Sensory Assessment  (Somatosensory)    Sensory Assessment (Somatosensory) UE sensation intact  -JJ (r) JR (t) JJ (c)     Row Name 01/18/22 0811          Range of Motion Comprehensive    General Range of Motion bilateral upper extremity ROM WFL  -JJ (r) JR (t) JJ (c)     Row Name 01/18/22 0811          Strength Comprehensive (MMT)    General Manual Muscle Testing (MMT) Assessment no strength deficits identified  -JJ (r) JR (t) JJ (c)     Comment, General Manual Muscle Testing (MMT) Assessment BUE grossly 4-/5  -JJ (r) JR (t) JJ (c)     Row Name 01/18/22 0811          Balance    Balance Assessment sitting static balance; sitting dynamic balance; sit to stand dynamic balance; standing static balance; standing dynamic balance  -JJ (r) JR (t) JJ (c)     Static Sitting Balance sitting, edge of bed; unsupported; mild impairment; supported  -JJ (r) JR (t) JJ (c)     Dynamic Sitting Balance sitting, edge of bed; unsupported; mild impairment; supported  -JJ (r) JR (t) JJ (c)     Sit to Stand Dynamic Balance supported; mild impairment; standing  -JJ (r) JR (t) JJ (c)     Static Standing Balance supported; mild impairment; standing  -JJ (r) JR (t) JJ (c)     Dynamic Standing Balance supported; mild impairment; standing  -JJ (r) JR (t) JJ (c)           User Key  (r) = Recorded By, (t) = Taken By, (c) = Cosigned By    Initials Name Provider Type    Katherin Bañuelos, OTR/L, CSRS Occupational Therapist    Nia Pedro, OT Student OT Student               Goals/Plan     Row Name 01/18/22 0811          Dressing Goal 1 (OT)    Activity/Device (Dressing Goal 1, OT) lower body dressing  -MARIA DEL CARMEN (r)  (t) MARIA DEL CARMEN (c)     [place]/Cues Needed (Dressing Goal 1, OT) supervision required  -MARIA DEL CARMEN (r)  (t) [place] (c)     Time Frame (Dressing Goal 1, OT) long term goal (LTG); 10 days  -MARIA DEL CARMEN (r)  (t) MARIA DEL CARMEN (c)     Progress/Outcome (Dressing Goal 1, OT) goal ongoing  -MARIA DEL CARMEN (r)  (t) MARIA DEL CARMEN (c)     Row Name 01/18/22 0811          Toileting Goal 1 (OT)    Activity/Device  (Toileting Goal 1, OT) toileting skills, all; commode  -[place] (r)  (t) JJ (c)     [place] Level/Cues Needed (Toileting Goal 1, OT) supervision required  -JJ (r)  (t) JJ (c)     Time Frame (Toileting Goal 1, OT) long term goal (LTG); 10 days  -JJ (r)  (t) JJ (c)     Progress/Outcome (Toileting Goal 1, OT) goal ongoing  -JJ (r) JR (t) JJ (c)     Row Name 01/18/22 [facility]          Problem Specific Goal 1 (OT)    Time Frame (Problem Specific Goal 1, OT) long term goal (LTG); 10 days  -JJ (r)  (t) JJ (c)     Progress/Outcome (Problem Specific Goal 1, OT) goal ongoing  -JJ (r)  (t) JJ (c)     Row Name 01/18/22 [facility]          Therapy Assessment/Plan (OT)    Planned Therapy Interventions (OT) activity tolerance training; BADL retraining; functional balance retraining; occupation/activity based interventions; patient/caregiver education/training; strengthening exercise  -MARIA DEL CARMEN (r)  (t) [place] (c)           User Key  (r) = Recorded By, (t) = Taken By, (c) = Cosigned By    Initials Name Provider Type    Katherin Bañuelos, OTR/L, CSRS Occupational Therapist    Nia Pedro, OT Student OT Student               Clinical Impression     Row Name 01/18/22 [facility]          Pain Assessment    Additional Documentation Pain Scale: Numbers Pre/Post-Treatment (Group)  -MARIA DEL CARMEN (r)  (t) [place] (c)     Row Name 01/18/22 [facility]          Pain Scale: Numbers Pre/Post-Treatment    Pretreatment Pain Rating 6/10  -JJ (r)  (t) JJ (c)     Posttreatment Pain Rating 0/10 - no pain  -JJ (r)  (t) JJ (c)     Pain Location - Side Bilateral  -[place] (r)  (t) JJ (c)     Pain Location - Orientation --  -[place] (r)  (t) JJ (c)     Pain Location --  -JJ (r)  (t) JJ (c)     Pre/Posttreatment Pain Comment pain in knees and legs  -MARIA DEL CARMEN ([place])  (hank) MARIA DEL CARMEN (c)     Pain Intervention(s) Ambulation/increased activity; Repositioned; Medication (See MAR)  -MARIA DEL CARMEN quinn)  (hank) MARIA DEL CARMEN (cristóbal)     Row Name 01/18/22 0850          Plan of Care Review    Plan of Care Reviewed With patient  -MARIA DEL CARMEN  ([place])  (hank) MARIA DEL CARMEN (c)     Progress improving  -MARIA DEL CARMEN ([place])  (t) MARIA DEL CARMEN (c)     Outcome Summary OT eval completed. Pt AxO x4 but demos conversational confusion. Pt c/o tiredness and knee pain throughout eval. Pt requires modA with supine to sit and Jasmeet with sit to supine bed mobility with vc. Pt HR increased from 60s to 150s during sitting activity and returned back to 80 at rest. Nsg was notified and reported she would monitor and evaluate the situation throughout day. Pt requires maxA to don socks at EOB. Pt amb from bed<>BR with CGA x2, vc, and front-wheeled walker. Pt requires Jasmeet to CGA x2 and vc with all sit<>stand transfers. During toilet transfer pt uses grab bars for sit<>stand and requires Jas[place] to CGA x2 and vc for positioning on toilet and gripping toilet paper. Pt demos a decreased sense of safety awareness throughout eval. Pt would benefit from skilled OT services to increase functional endurance and safety awareness and to increase independence in ADLs. Recommended d/c SNF.  -MARIA DEL CARMEN ([place])  (hank) MARIA DEL CARMEN (c)     Row Name 01/18/22 0811          Therapy Assessment/Plan (OT)    Rehab Potential (OT) good, to achieve stated therapy goals  -MARIA DEL CARMEN ([place])  (hank) MARIA DEL CARMEN (c)     Criteria for Skilled Therapeutic Interventions Met (OT) yes; skilled treatment is necessary  -MARIA DEL CARMEN ([place])  (hank) MARIA DEL CARMEN (c)     Therapy Frequency (OT) 5 times/wk  -MARIA DEL CARMEN ([place])  (hank) MARIA DEL CARMEN (c)     Row Name 01/18/22 0811          Therapy Plan Review/Discharge Plan (OT)    Anticipated Discharge Disposition (OT) skilled nursing facility  -MARIA DEL CARMEN ([place])  (hank) MARIA DEL CARMEN (c)     Row Name 01/18/22 0811          Vital Signs    Pretreatment Heart Rate (beats/min) 60  -MARIA DEL CARMEN (r)  (hank) JJ (c)     Intratreatment Heart Rate (beats/min) 152  with seated rest returned to 80  -MARIA DEL CARMEN (r)  (hank) JJ (c)     Posttreatment Heart Rate (beats/min) 154  before exiting room, HR decreased into 120s, nsg notified and aware  -MARIA DEL CARMEN ([place])  (t) MARIA DEL CARMEN (c)     O2 Delivery Pre Treatment nasal cannula  -MARIA DEL CARMEN (r)  (t) MARIA DEL CARMEN (c)     O2 Delivery  Intra Treatment nasal cannula  -MARIA DEL CARMEN (r)  (t) MARIA DEL CARMEN (c)     O2 Delivery Post Treatment nasal cannula  -MARIA DEL CARMEN (r)  (t) [place] (c)     Row Name 01/18/22 0811          Positioning and Restraints    Pre-Treatment Position in bed  -MARIA DEL CARMEN (r)  (t) JJ (c)     Post Treatment Position bed  -MARIA DEL CARMEN (r)  (t) MARIA DEL CARMEN (c)     In Bed call light within reach; encouraged to call for assist; fowlers; exit alarm on; with nsg; side rails up x2; notified nsg  -[place] (r)  (t) JJ (c)           User Key  (r) = Recorded By, (t) = Taken By, (c) = Cosigned By    Initials Name Provider Type    Katherin Bañuelos, OTR/L, CSRS Occupational Therapist    Nia Pedro, OT Student OT Student               Outcome Measures     Row Name 01/18/22 0811          How much help from another is currently needed...    Putting on and taking off regular lower body clothing? 3  -JJ (r) JR (t) JJ (c)     Bathing (including washing, rinsing, and drying) 3  -JJ (r) JR (t) JJ (c)     Toileting (which includes using toilet bed pan or urinal) 3  -JJ (r) JR (t) JJ (c)     Putting on and taking off regular upper body clothing 3  -JJ (r) JR (t) JJ (c)     Taking care of personal grooming (such as brushing teeth) 3  -JJ (r) JR (t) JJ (c)     Eating meals 4  -JJ (r) JR (t) JJ (c)     AM-PAC 6 Clicks Score (OT) 19  -JJ (r) JR (t)     Row Name 01/18/22 0823          How much help from another person do you currently need...    Turning from your back to your side while in flat bed without using bedrails? 3  -JE     Moving from lying on back to sitting on the side of a flat bed without bedrails? 3  -JE     Moving to and from a bed to a chair (including a wheelchair)? 3  -JE     Standing up from a chair using your arms (e.g., wheelchair, bedside chair)? 3  -JE     Climbing 3-5 steps with a railing? 3  -JE     To walk in hospital room? 3  -JE     AM-PAC 6 Clicks Score (PT) 18  -JE     Row Name 01/18/22 0823 01/18/22 0811       Functional Assessment    Outcome Measure Options AM-PAC 6  Clicks Basic Mobility (PT)  -VARUN AM-PAC 6 Clicks Daily Activity (OT)  -JJ (r) JR (t) JJ (c)          User Key  (r) = Recorded By, (t) = Taken By, (c) = Cosigned By    Initials Name Provider Type    Maira Ochoa, PT Physical Therapist    Katherin Bañuelos, OTR/L, CSRS Occupational Therapist    Nia Pedro, OT Student OT Student                Occupational Therapy Education                 Title: PT OT SLP Therapies (In Progress)     Topic: Occupational Therapy (In Progress)     Point: ADL training (Done)     Description:   Instruct learner(s) on proper safety adaptation and remediation techniques during self care or transfers.   Instruct in proper use of assistive devices.              Learning Progress Summary           Patient Acceptance, E,D, VU,NR by  at 1/18/2022 0811                   Point: Home exercise program (Done)     Description:   Instruct learner(s) on appropriate technique for monitoring, assisting and/or progressing therapeutic exercises/activities.              Learning Progress Summary           Patient Acceptance, E,D, VU,NR by  at 1/18/2022 0811                   Point: Precautions (Done)     Description:   Instruct learner(s) on prescribed precautions during self-care and functional transfers.              Learning Progress Summary           Patient Acceptance, E,D, VU,NR by  at 1/18/2022 0811                   Point: Body mechanics (Not Started)     Description:   Instruct learner(s) on proper positioning and spine alignment during self-care, functional mobility activities and/or exercises.              Learner Progress:  Not documented in this visit.                      User Key     Initials Effective Dates Name Provider Type Discipline     12/28/21 -  Nia Estes, OT Student OT Student OT              OT Recommendation and Plan  Planned Therapy Interventions (OT): activity tolerance training, BADL retraining, functional balance retraining, occupation/activity based  interventions, patient/caregiver education/training, strengthening exercise  Therapy Frequency (OT): 5 times/wk  Plan of Care Review  Plan of Care Reviewed With: patient  Progress: improving  Outcome Summary: OT eval completed. Pt AxO x4 but demos conversational confusion. Pt c/o tiredness and knee pain throughout eval. Pt requires modA with supine to sit and [place]as[place] with sit to supine bed mobility with vc. Pt HR increased from 60s to 150s during sitting activity and returned back to 80 at rest. Nsg was notified and reported she would monitor and evaluate the situation throughout day. Pt requires maxA to don socks at EOB. Pt amb from bed<>BR with CGA x2, vc, and front-wheeled walker. Pt requires [place]as[place] to CGA x2 and vc with all sit<>stand transfers. During toilet transfer pt uses grab bars for sit<>stand and requires Jasmeet to CGA x2 and vc for positioning on toilet and gripping toilet paper. Pt demos a decreased sense of safety awareness throughout eval. Pt would benefit from skilled OT services to increase functional endurance and safety awareness and to increase independence in ADLs. Recommended d/c SNF.     Time Calculation:    Time Calculation- OT     Row Name 01/18/22 0811             Time Calculation- OT    OT Start Time 0811  +10 min chart review  -MARIA DEL CARMEN ([place])  (t) MARIA DEL CARMEN (c)      OT Stop Time 0915  -MARIA DEL CARMEN ([place])  (t) MARIA DEL CARMEN (c)      OT Time Calculation (min) 64 min  -MARIA DEL CARMEN ([place])  (t)      OT Received On 01/18/22  -MARIA DEL CARMEN ([place])  (t) MARIA DEL CARMEN (c)      OT Goal Re-Cert Due Date 01/28/22  -MARIA DEL CARMEN ([place])  (hank) MARIA DEL CARMEN (c)            User Key  (r) = Recorded By, (t) = Taken By, (c) = Cosigned By    Initials Name Provider Type    Katherin Bañuelos, OTR/L, CSRS Occupational Therapist    Nia Pedro, OT Student OT Student                       Nia Estes, OT Student  1/18/2022

## 2022-01-19 PROBLEM — R00.1 SYMPTOMATIC BRADYCARDIA: Status: ACTIVE | Noted: 2022-01-01

## 2022-01-19 PROBLEM — N39.0 URINARY TRACT INFECTION: Status: ACTIVE | Noted: 2022-01-01

## 2022-01-19 NOTE — CONSULTS
"[facility] HEART GROUP CONSULT NOTE    Referring Provider: Christine Foy DO    Reason for Consultation: symptomatic bradycardia    Chief complaint:   Chief Complaint   Patient presents with   • Back Pain       Subjective     History of present illness:  Alissa Dominguez is a 76 y.o. female with COPD, HTN and diabetes. She presented to the hospital after a fall with complaints of back pain on 1/16. She resides at a NH and was noted to be hypoxic on arrival. She has been treated by hospitalist for COPD exacerbation and UTI. Today while walking with therapy, per nursing, she became \"out of it and lost her color\" and said \"help me\" per PTA documentation. Her HR was checked via pulse ox and noted to be 30. It quickly increased and her color returned to normal. She was then placed on telemetry. During my exam she was drowsy and unable to hold her eyes open but continued to carry on a conversation. She notes her only symptom while ambulating to be nausea. When asked about chest pain she noted \"I have a scratching in my chest while I'm at school or work\". She denies lightheadedness, dizziness, syncope and near syncope.     History  Past Medical History:   Diagnosis Date   • Cataracts, bilateral    • Cellulitis of left lower limb    • Cellulitis of right lower limb    • COPD (chronic obstructive pulmonary disease) ([facility])    • DJD (degenerative joint disease), cervical     DJD C Spine/Spondylolysis, Cervical Region   • Essential (primary) hypertension    • GERD (gastroesophageal reflux disease)    • Hematoma     Left mastectomy site   • Hypothyroidism    • Major depressive disorder, recurrent, moderate ([facility])    • Malignant neoplasm of left breast ([facility])    • 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, estrogen receptor positive ([facility]) 11/30/2021   • Migraine    • Morbid (severe) obesity due to excess calories ([facility])    • Obstructive sleep apnea    • PONV (postoperative nausea and vomiting)    • " "Primary generalized (osteo)arthritis    • Pure hypercholesterolemia    • Tinea unguium    • Type 2 diabetes mellitus with hyperglycemia (HCC)    • Venous insufficiency (chronic) (peripheral)    ,   Past Surgical History:   Procedure Laterality Date   • BREAST ABSCESS INCISION AND DRAINAGE Left 12/21/2021    Procedure: EVACUATION OF LEFT MASTECTOMY HEMATOMA;  Surgeon: Naye Tavarez MD;  Location:  [facility] OR;  Service: General;  Laterality: Left;   • BREAST SURGERY Left     Left mastectomy   • CATARACT EXTRACTION, BILATERAL     • CATARACT EXTRACTION, BILATERAL     • ESSURE TUBAL LIGATION     • HYSTERECTOMY     • MASTECTOMY W/ SENTINEL NODE BIOPSY Left 11/30/2021    Procedure: LEFT MASTECTOMY WITH SENTINEL LYMPH NODE BIOPSY WITH MAGTRACE;  Surgeon: Naye Tavarez MD;  Location:  [facility] OR;  Service: General;  Laterality: Left;   • VARICOSE VEIN SURGERY Right    ,   Family History   Problem Relation Age of Onset   • Hypertension Sister    • Heart disease Mother    ,   Social History     Tobacco Use   • Smoking status: Current Every Day Smoker     Packs/day: 1.00     Types: Electronic Cigarette   • Smokeless tobacco: Never Used   Vaping Use   • Vaping Use: Every day   • Substances: Nicotine, patient states it has \"3%\" not sure if it is THC or CBD   • Devices: Disposable   • Passive vaping exposure: Yes   Substance Use Topics   • Alcohol use: No   • Drug use: No   ,     Medications    Prior to Admission medications    Medication Sig Start Date End Date Taking? Authorizing Provider   acetaminophen (Tylenol) 325 MG tablet Take 3 tablets by mouth Every 8 (Eight) Hours. Take every 8 hours for 3 days then take prn as needed. 12/3/21 12/3/22 Yes Naye Tavarez MD   docusate sodium (COLACE) 100 MG capsule Take 100 mg by mouth 2 (Two) Times a Day.   Yes ProviderChelsie MD   levothyroxine (Euthyrox) 75 MCG tablet Take 75 mcg by mouth Daily.   Yes ProviderChelsie MD   ondansetron (Zofran) 4 MG tablet " Take 1 tablet by mouth Every 8 (Eight) Hours As Needed for Nausea. 12/21/21 12/21/22 Yes Naye Tavarez MD   oxyCODONE-acetaminophen (PERCOCET)  MG per tablet Take 1 tablet by mouth Every 8 (Eight) Hours As Needed for Moderate Pain .   Yes Provider, MD Chelsie   triamterene-hydrochlorothiazide (MAXZIDE) 75-50 MG per tablet Take 1 tablet by mouth Daily.   Yes Provider, MD Chelsie   valsartan (DIOVAN) 80 MG tablet Take 1 tablet by mouth Daily. 1/13/22  Yes Kenn Moore DO       Current Facility-Administered Medications   Medication Dose Route Frequency Provider Last Rate Last Admin   • acetaminophen (TYLENOL) tablet 975 mg  975 mg Oral Q8H Christine Foy DO   975 mg at 01/19/22 0639   • docusate sodium (COLACE) capsule 100 mg  100 mg Oral BID PRN Christine Foy DO       • enoxaparin (LOVENOX) syringe 80 mg  1 mg/kg Subcutaneous Q12H Kenn Moore DO   80 mg at 01/19/22 0639   • levoFLOXacin (LEVAQUIN) tablet 250 mg  250 mg Oral Q24H Damián Briceño DO   250 mg at 01/18/22 2315   • levothyroxine (SYNTHROID, LEVOTHROID) tablet 75 mcg  75 mcg Oral Q AM Christine Foy DO   75 mcg at 01/19/22 0639   • ondansetron (ZOFRAN) tablet 4 mg  4 mg Oral Q8H PRN Christine Foy DO       • oxyCODONE-acetaminophen (PERCOCET) 5-325 MG per tablet 1 tablet  1 tablet Oral Q6H PRN Christine Foy DO       • sodium chloride 0.9 % flush 10 mL  10 mL Intravenous PRN Aldo Blevins APRN       • sodium chloride 0.9 % flush 10 mL  10 mL Intravenous Q12H Christine Foy DO   10 mL at 01/19/22 0838   • sodium chloride 0.9 % flush 10 mL  10 mL Intravenous PRN Christine Foy DO       • valsartan (DIOVAN) tablet 80 mg  80 mg Oral Daily Chrsitine Foy DO   80 mg at 01/19/22 0838       Allergies:  Augmentin [amoxicillin-pot clavulanate], Ceclor [cefaclor], Codeine, Darvon [propoxyphene], Fulvicin p-g [griseofulvin], Niacin, and Valium [diazepam]    Review of  Systems  Review of Systems   Reason unable to perform ROS: confused.   Constitutional: Negative for diaphoresis, fatigue and unexpected weight change.   Respiratory: Negative for chest tightness, shortness of breath and wheezing.    Cardiovascular: Negative for chest pain, palpitations and leg swelling.   Gastrointestinal: Positive for nausea. Negative for diarrhea and vomiting.   Neurological: Positive for weakness. Negative for dizziness, syncope and light-headedness.        Intermittently confused       Objective     Physical Exam:  Patient Vitals for the past 24 hrs:   BP Temp Temp src Pulse Resp SpO2   01/19/22 1100 124/83 97.7 °F (36.5 °C) Oral 95 16 93 %   01/19/22 0750 131/87 97.3 °F (36.3 °C) Oral 83 16 91 %   01/19/22 0345 99/72 98.7 °F (37.1 °C) Oral 60 16 91 %   01/18/22 2328 129/82 98.1 °F (36.7 °C) Oral 84 20 91 %   01/18/22 2005 113/79 98 °F (36.7 °C) Oral 69 20 94 %     Vitals reviewed.   Constitutional:       General: Not in acute distress.     Appearance: Healthy appearance. Well-developed. Not diaphoretic.   Eyes:      General: No scleral icterus.     Conjunctiva/sclera: Conjunctivae normal.      Pupils: Pupils are equal, round, and reactive to light.   HENT:      Head: Normocephalic.    Mouth/Throat:      Pharynx: No oropharyngeal exudate.   Neck:      Vascular: No JVR.   Pulmonary:      Effort: Pulmonary effort is normal. No respiratory distress.      Breath sounds: Normal breath sounds. No wheezing. No rhonchi. No rales.   Chest:      Chest wall: Not tender to palpatation.   Cardiovascular:      Normal rate. Regular rhythm.   Pulses:     Intact distal pulses.   Edema:     Peripheral edema absent.   Abdominal:      General: Bowel sounds are normal. There is no distension.      Palpations: Abdomen is soft.      Tenderness: There is no abdominal tenderness.   Musculoskeletal: Normal range of motion.      Cervical back: Normal range of motion and neck supple. Skin:     General: Skin is warm and  dry.      Coloration: Skin is not pale.      Findings: No erythema or rash.   Neurological:      Mental Status: Alert, oriented to person, place, and time and oriented to person, place and time.      Deep Tendon Reflexes: Reflexes are normal and symmetric.   Psychiatric:         Behavior: Behavior normal.         Results Review:   I reviewed the patient's new clinical results.    Lab Results (last 72 hours)     Procedure Component Value Units Date/Time    Blood Culture - Blood, Arm, Left [[id number]]  (Normal) Collected: 01/16/22 1648    Specimen: Blood from Arm, Left Updated: 01/18/22 1715     Blood Culture No growth at 2 days    Blood Culture - Blood, Hand, Left [[id number]]  (Normal) Collected: 01/16/22 1648    Specimen: Blood from Hand, Left Updated: 01/18/22 1715     Blood Culture No growth at 2 days    Urine Culture - Urine, Urine, Clean Catch [[id number]]  (Abnormal)  (Susceptibility) Collected: 01/16/22 1907    Specimen: Urine, Clean Catch Updated: 01/18/22 0811     Urine Culture >100,000 CFU/mL Escherichia coli    Susceptibility      Escherichia coli     GINA     Ampicillin Intermediate     Ampicillin + Sulbactam Susceptible     Cefazolin Susceptible     Cefepime Susceptible     Ceftazidime Susceptible     Ceftriaxone Susceptible     Gentamicin Susceptible     Levofloxacin Resistant     Nitrofurantoin Susceptible     Piperacillin + Tazobactam Susceptible     Trimethoprim + Sulfamethoxazole Susceptible                  Linear View                   Comprehensive Metabolic Panel [[id number]]  (Abnormal) Collected: 01/17/22 0541    Specimen: Blood Updated: 01/17/22 0639     Glucose 107 mg/dL      BUN 16 mg/dL      Creatinine 0.57 mg/dL      Sodium 143 mmol/L      Potassium 3.6 mmol/L      Chloride 103 mmol/L      CO2 28.0 mmol/L      Calcium 8.7 mg/dL      Total Protein 6.5 g/dL      Albumin 3.70 g/dL      ALT (SGPT) 12 U/L      AST (SGOT) 21 U/L      Alkaline Phosphatase 60 U/L      Total Bilirubin 0.7 mg/dL       eGFR Non African Amer 103 mL/min/1.73      Globulin 2.8 gm/dL      A/G Ratio 1.3 g/dL      BUN/Creatinine Ratio 28.1     Anion Gap 12.0 mmol/L     Narrative:      GFR Normal >60  Chronic Kidney Disease <60  Kidney Failure <15      Troponin [[id number]]  (Normal) Collected: 01/17/22 0541    Specimen: Blood Updated: 01/17/22 0639     Troponin T <0.010 ng/mL     Narrative:      Troponin T Reference Range:  <= 0.03 ng/mL-   Negative for AMI  >0.03 ng/mL-     Abnormal for myocardial necrosis.  Clinicians would have to utilize clinical acumen, EKG, Troponin and serial changes to determine if it is an Acute Myocardial Infarction or myocardial injury due to an underlying chronic condition.       Results may be falsely decreased if patient taking Biotin.      Iron Profile [[id number]]  (Abnormal) Collected: 01/17/22 0541    Specimen: Blood Updated: 01/17/22 0639     Iron 22 mcg/dL      Iron Saturation 5 %      Transferrin 290 mg/dL      TIBC 432 mcg/dL     CBC Auto Differential [[id number]]  (Abnormal) Collected: 01/17/22 0541    Specimen: Blood Updated: 01/17/22 0619     WBC 6.25 10*3/mm3      RBC 5.26 10*6/mm3      Hemoglobin 12.6 g/dL      Hematocrit 41.6 %      MCV 79.1 fL      MCH 24.0 pg      MCHC 30.3 g/dL      RDW 16.9 %      RDW-SD 47.3 fl      MPV 9.9 fL      Platelets 297 10*3/mm3      Neutrophil % 73.0 %      Lymphocyte % 13.8 %      Monocyte % 11.7 %      Eosinophil % 0.8 %      Basophil % 0.5 %      Immature Grans % 0.2 %      Neutrophils, Absolute 4.57 10*3/mm3      Lymphocytes, Absolute 0.86 10*3/mm3      Monocytes, Absolute 0.73 10*3/mm3      Eosinophils, Absolute 0.05 10*3/mm3      Basophils, Absolute 0.03 10*3/mm3      Immature Grans, Absolute 0.01 10*3/mm3      nRBC 0.0 /100 WBC     Urinalysis With Culture If Indicated - Urine, Clean Catch [[id number]]  (Abnormal) Collected: 01/16/22 1907    Specimen: Urine, Clean Catch Updated: 01/16/22 1954     Color, UA Dark Yellow     Appearance, UA Cloudy     pH, UA  <=5.0     Specific Gravity, UA >1.030     Glucose, UA Negative     Ketones, UA Trace     Bilirubin, UA Small (1+)     Blood, UA Small (1+)     Protein, UA >=300 mg/dL (3+)     Leuk Esterase, UA Trace     Nitrite, UA Positive     Urobilinogen, UA 1.0 E.U./dL    Urinalysis, Microscopic Only - Urine, Clean Catch [[id number]]  (Abnormal) Collected: 01/16/22 1907    Specimen: Urine, Clean Catch Updated: 01/16/22 1954     RBC, UA 0-2 /HPF      WBC, UA 13-20 /HPF      Bacteria, UA 4+ /HPF      Squamous Epithelial Cells, UA 3-6 /HPF      Hyaline Casts, UA None Seen /LPF      Methodology Manual Light Microscopy    Influenza Antigen, Rapid - Swab, Nasopharynx [[id number]]  (Normal) Collected: 01/16/22 1647    Specimen: Swab from Nasopharynx Updated: 01/16/22 1821     Influenza A Ag, EIA Negative     Influenza B Ag, EIA Negative    Narrative:      Recommend confirmation of negative results by viral culture or molecular assay.    COVID PRE-OP / PRE-PROCEDURE SCREENING ORDER (NO ISOLATION) - Swab, Nasal Cavity [[id number]]  (Normal) Collected: 01/16/22 1647    Specimen: Swab from Nasal Cavity Updated: 01/16/22 1816    Narrative:      The following orders were created for panel order COVID PRE-OP / PRE-PROCEDURE SCREENING ORDER (NO ISOLATION) - Swab, Nasal Cavity.  Procedure                               Abnormality         Status                     ---------                               -----------         ------                     COVID-19,[employer] IN-[facility]RI[facility]...[[id number]]  Normal              Final result                 Please view results for these tests on the individual orders.    COVID-19,[employer] [employer] IN-HOUSE,Nasal Swab No Transport Media 3-4 HR TAT - Swab, Nasal Cavity [[id number]]  (Normal) Collected: 01/16/22 1647    Specimen: Swab from Nasal Cavity Updated: 01/16/22 1816     COVID19 Not Detected    Narrative:      Fact sheet for providers: https://www.fda.gov/media/670051/download     Fact sheet for patients:  "https://www.fda.gov/media/256824/download    Test performed by PCR.    Consider negative results in combination with clinical observations, patient history, and epidemiological information.  Fact sheet for providers: https://www.fda.gov/media/401341/download     Fact sheet for patients: https://www.[employer].gov/media/818389/download    Test performed by PCR.    Consider negative results in combination with clinical observations, patient history, and epidemiological information.    D-dimer, Quantitative [[id number]]  (Abnormal) Collected: 01/16/22 1648    Specimen: Blood Updated: 01/16/22 1811     D-Dimer, Quantitative 1.60 mg/L (FEU)     Narrative:      Reference Range is 0-0.50 mg/L FEU. However, results <0.50 mg/L FEU tends to rule out DVT or PE. Results >0.50 mg/L FEU are not useful in predicting absence or presence of DVT or PE.      Protime-INR [[id number]]  (Abnormal) Collected: 01/16/22 1648    Specimen: Blood Updated: 01/16/22 1740     Protime 14.7 Seconds      INR 1.20    Procalcitonin [[id number]]  (Normal) Collected: 01/16/22 1648    Specimen: Blood Updated: 01/16/22 1734     Procalcitonin 0.10 ng/mL     Narrative:      As a Marker for Sepsis (Non-Neonates):     1. <0.5 ng/mL represents a low risk of severe sepsis and/or septic shock.  2. >2 ng/mL represents a high risk of severe sepsis and/or septic shock.    As a Marker for Lower Respiratory Tract Infections that require antibiotic therapy:  PCT on Admission     Antibiotic Therapy             6-12 Hrs later  >0.5                          Strongly Recommended            >0.25 - <0.5             Recommended  0.1 - 0.25                  Discouraged                       Remeasure/reassess PCT  <0.1                         Strongly Discouraged         Remeasure/reassess PCT      As 28 day mortality risk marker: \"Change in Procalcitonin Result\" (>80% or <=80%) if Day 0 (or Day 1) and Day 4 values are available. Refer to " http://www.[facility]-pct-calculator.com/    Change in PCT <=80 %   A decrease of PCT levels below or equal to 80% defines a positive change in PCT test result representing a higher risk for 28-day all-cause mortality of patients diagnosed with severe sepsis or septic shock.    Change in PCT >80 %   A decrease of PCT levels of more than 80% defines a negative change in PCT result representing a lower risk for 28-day all-cause mortality of patients diagnosed with severe sepsis or septic shock.                Comprehensive Metabolic Panel [[id number]]  (Abnormal) Collected: 01/16/22 1648    Specimen: Blood Updated: 01/16/22 1729     Glucose 169 mg/dL      BUN 18 mg/dL      Creatinine 0.71 mg/dL      Sodium 143 mmol/L      Potassium 3.8 mmol/L      Chloride 104 mmol/L      CO2 28.0 mmol/L      Calcium 9.4 mg/dL      Total Protein 7.0 g/dL      Albumin 4.00 g/dL      ALT (SGPT) 11 U/L      AST (SGOT) 19 U/L      Alkaline Phosphatase 66 U/L      Total Bilirubin 0.6 mg/dL      eGFR Non African Amer 80 mL/min/1.73      Globulin 3.0 gm/dL      A/G Ratio 1.3 g/dL      BUN/Creatinine Ratio 25.4     Anion Gap 11.0 mmol/L     Narrative:      GFR Normal >60  Chronic Kidney Disease <60  Kidney Failure <15      Troponin [[id number]]  (Normal) Collected: 01/16/22 1648    Specimen: Blood Updated: 01/16/22 1724     Troponin T <0.010 ng/mL     Narrative:      Troponin T Reference Range:  <= 0.03 ng/mL-   Negative for AMI  >0.03 ng/mL-     Abnormal for myocardial necrosis.  Clinicians would have to utilize clinical acumen, EKG, Troponin and serial changes to determine if it is an Acute Myocardial Infarction or myocardial injury due to an underlying chronic condition.       Results may be falsely decreased if patient taking Biotin.      Lipase [[id number]]  (Normal) Collected: 01/16/22 1648    Specimen: Blood Updated: 01/16/22 1724     Lipase 16 U/L     Lactic Acid, Plasma [[id number]]  (Normal) Collected: 01/16/22 1648    Specimen: Blood  Updated: 01/16/22 1719     Lactate 1.8 mmol/L     CBC & Differential [[id number]]  (Abnormal) Collected: 01/16/22 1648    Specimen: Blood Updated: 01/16/22 1706    Narrative:      The following orders were created for panel order CBC & Differential.  Procedure                               Abnormality         Status                     ---------                               -----------         ------                     CBC Auto Differential[[id number]]        Abnormal            Final result                 Please view results for these tests on the individual orders.    CBC Auto Differential [[id number]]  (Abnormal) Collected: 01/16/22 1648    Specimen: Blood Updated: 01/16/22 1706     WBC 5.82 10*3/mm3      RBC 5.54 10*6/mm3      Hemoglobin 13.0 g/dL      Hematocrit 43.6 %      MCV 78.7 fL      MCH 23.5 pg      MCHC 29.8 g/dL      RDW 17.2 %      RDW-SD 48.1 fl      MPV 9.4 fL      Platelets 332 10*3/mm3      Neutrophil % 71.0 %      Lymphocyte % 16.0 %      Monocyte % 11.3 %      Eosinophil % 0.9 %      Basophil % 0.5 %      Immature Grans % 0.3 %      Neutrophils, Absolute 4.13 10*3/mm3      Lymphocytes, Absolute 0.93 10*3/mm3      Monocytes, Absolute 0.66 10*3/mm3      Eosinophils, Absolute 0.05 10*3/mm3      Basophils, Absolute 0.03 10*3/mm3      Immature Grans, Absolute 0.02 10*3/mm3      nRBC 0.0 /100 WBC           No results found for: ECHOEFEST    Imaging Results (Last 72 Hours)     Procedure Component Value Units Date/Time    US Venous Doppler Lower Extremity Bilateral (duplex) [[id number]] Collected: 01/18/22 1605     Updated: 01/18/22 1609    Narrative:      History: Swelling       Impression:      Impression: There is evidence of deep venous thrombosis in bilateral  lower extremities.     Comments: Bilateral lower extremity venous duplex exam was performed  using color Doppler flow, Doppler waveform analysis, and grayscale  imaging, with and without compression. There is evidence of nonocclusive  deep  venous thrombosis in the popliteal vein in the right lower  extremity. There is also evidence of deep venous thrombosis in the  posterior tibial vein in the left lower extremity. No thrombus is  identified in the saphenofemoral junctions and greater saphenous veins  bilaterally.         This report was finalized on 01/18/2022 16:06 by Dr. Kwame Corado MD.    CT Angiogram Chest [[id number]] Collected: 01/16/22 2028     Updated: 01/16/22 2033    Narrative:      EXAMINATION: CT ANGIOGRAM CHEST-      1/16/2022 8:04 PM CST     HISTORY: PE suspected, low/intermediate prob, positive D-dimer     In order to have a CT radiation dose as low as reasonably achievable  Automated Exposure Control was utilized for adjustment of the mA and/or  KV according to patient size.     DLP in mGycm= 338.     CT angiogram chest.  CT angiography protocol.   CT imaging with bolus IV contrast injection.   Under concurrent supervision axial, sagittal, coronal, and  three-dimensional data sets were constructed.     Normal heart size.  Coronary artery calcification.  Thoracic aorta calcification.     Symmetric and normally opacified pulmonary arteries.  No pulmonary embolism.     Chronic interstitial lung disease.  Mild dependent atelectasis.  No pneumonia, pneumothorax, or pleural effusion.     Summary:  1. No pulmonary embolism.  2. Chronic lung changes.                                   This report was finalized on 01/16/2022 20:30 by Dr. López Tolentino MD.    XR Chest 1 View [[id number]] Collected: 01/16/22 1655     Updated: 01/16/22 1659    Narrative:      EXAMINATION: XR CHEST 1 VW-     1/16/2022 4:23 PM CST     HISTORY: Shortness of breath.     One view chest x-ray.     Comparison is made with January 8, 2022.     Heart size is magnified though there does appear to be mild  cardiomegaly.  Aortic arch calcification is present.     Chronic lung changes with scattered granulomas.     No focal consolidation.     No pneumothorax or heart  failure.     Summary:  1. Stable chronic lung changes.     This report was finalized on 01/16/2022 16:56 by Dr. López Tolentino MD.          Assessment/Plan       COPD exacerbation (HCC)    Essential (primary) hypertension    Breast mass    Acute cystitis    Hypothyroidism    Symptomatic bradycardia    Urinary tract infection      Plan:   Symptomatic Bradycardia: was placed on telemetry after suspected bradycardia per pulse ox. Rates remain  with frequent PACs and PVCs. No evidence of bradycardia thus far. Will continue to monitor telemetry. Not currently taking BB therapy. Low reading per pulse ox could be secondary to frequent PACs and PVCs.     COPD exacerbation: treatment per attending.     UTI: currently on PO levaquin.     Further orders per Dr. Lucia    Thank you for asking us to follow this patient with you.     Electronically signed by HERMES Simmons, 01/19/22, 12:12 PM CST.    Please note this cardiology consultation note is the result of a face to face consultation with the patient, in addition to reviewing medical records at length by myself, HERMES Foote      Time: approximately 45 minutes

## 2022-01-19 NOTE — PLAN OF CARE
"Goal Outcome Evaluation:  Plan of Care Reviewed With: patient        Progress: improving  Outcome Summary: PT tx completed. Pt reports increased fatigue and weakness. Throughout tx monitored HR and ranged 30-130s, unsure if accurate reading. Notified nsg. Pt had moments of inreased fatigue in voice and repeat \"help me.\" Pt states she feels funny but unable to give specific symptoms. SBA for supine to sit. Pt able to stand x5 reps in bed with CGA and RWX. t/f to BSC with CGA and RWX. Completed BLE AROM x15 reps seated. Returned to bed with min x1. Will cont to follow.  "

## 2022-01-19 NOTE — CASE MANAGEMENT/SOCIAL WORK
Continued Stay Note   Melody     Patient Name: Alissa Dominguez  MRN: [id number]  Today's Date: 1/19/2022    Admit Date: 1/16/2022     Discharge Plan     Row Name 01/19/22 1142       Plan    Plan [facility]    Patient/Family in Agreement with Plan yes    Plan Comments Insurance has approved precert. Informed Dr. Briceño.               Discharge Codes    No documentation.                     JERMAINE Del Castillo

## 2022-01-19 NOTE — THERAPY TREATMENT NOTE
Acute Care - Physical Therapy Treatment Note  [facility]     Patient Name: Alissa Dominguez  : 1945  MRN: [id number]  Today's Date: 2022      Visit Dx:     ICD-10-CM ICD-9-CM   1. Hypoxia  R09.02 799.02   2. Urinary tract infection without hematuria, site unspecified  N39.0 599.0   3. Impaired functional mobility and activity tolerance  Z74.09 V49.89   4. Decreased activities of daily living (ADL)  Z78.9 V49.89     Patient Active Problem List   Diagnosis   • Essential (primary) hypertension   • Breast mass   • Acute cystitis   • Hypothyroidism   • COPD exacerbation ([facility])     Past Medical History:   Diagnosis Date   • Cataracts, bilateral    • Cellulitis of left lower limb    • Cellulitis of right lower limb    • COPD (chronic obstructive pulmonary disease) ([facility])    • DJD (degenerative joint disease), cervical     DJD C Spine/Spondylolysis, Cervical Region   • Essential (primary) hypertension    • GERD (gastroesophageal reflux disease)    • Hematoma     Left mastectomy site   • Hypothyroidism    • Major depressive disorder, recurrent, moderate ([facility])    • Malignant neoplasm of left breast ([facility])    • 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, estrogen receptor positive ([facility]) 2021   • Migraine    • Morbid (severe) obesity due to excess calories ([facility])    • Obstructive sleep apnea    • PONV (postoperative nausea and vomiting)    • Primary generalized (osteo)arthritis    • Pure hypercholesterolemia    • Tinea unguium    • Type 2 diabetes mellitus with hyperglycemia ([facility])    • Venous insufficiency (chronic) (peripheral)      Past Surgical History:   Procedure Laterality Date   • BREAST ABSCESS INCISION AND DRAINAGE Left 2021    Procedure: EVACUATION OF LEFT MASTECTOMY HEMATOMA;  Surgeon: Naye Tavarez MD;  Location: [facility];  Service: General;  Laterality: Left;   • BREAST SURGERY Left     Left mastectomy   • CATARACT EXTRACTION, BILATERAL     • CATARACT EXTRACTION,  BILATERAL     • ESSURE TUBAL LIGATION     • HYSTERECTOMY     • MASTECTOMY W/ SENTINEL NODE BIOPSY Left 11/30/2021    Procedure: LEFT MASTECTOMY WITH SENTINEL LYMPH NODE BIOPSY WITH MAGTRACE;  Surgeon: Naye Tavarez MD;  Location: [facility];  Service: General;  Laterality: Left;   • VARICOSE VEIN SURGERY Right      PT Assessment (last 12 hours)     PT Evaluation and Treatment     Row Name 01/19/22 1001          Physical Therapy Time and Intention    Subjective Information complains of; weakness; fatigue  -     Document Type therapy note (daily note)  -     Mode of Treatment physical therapy  -     Row Name 01/19/22 1001          General Information    Existing Precautions/Restrictions fall; oxygen therapy device and L/min  -     Row Name 01/19/22 1001          Pain Scale: Numbers Pre/Post-Treatment    Pretreatment Pain Rating 0/10 - no pain  -     Posttreatment Pain Rating 0/10 - no pain  -     Pre/Posttreatment Pain Comment pt reports she feels funny,unable to gve specific symptoms  -     Row Name 01/19/22 1001          Pain Scale: Word Pre/Post-Treatment    Pain Intervention(s) Medication (See MAR)  -     Row Name 01/19/22 1001          Bed Mobility    Supine-Sit [place] (Bed Mobility) verbal cues; standby assist  -     Sit-Supine [place] (Bed Mobility) verbal cues; minimum assist (75% patient effort)  -     Assistive Device (Bed Mobility) bed rails; head of bed elevated  -     Row Name 01/19/22 1001          Transfers    Comment (Transfers) pt stood x5 reps  -     Sit-Stand [place] (Transfers) verbal cues; contact guard  -     [place] Level (Toilet Transfer) verbal cues; contact guard  -     Assistive Device (Toilet Transfer) commode, bedside without drop arms; walker, front-wheeled  -     Row Name 01/19/22 1001          Sit-Stand Transfer    Assistive Device (Sit-Stand Transfers) walker, front-wheeled  -     Row Name 01/19/22 1001          Toilet Transfer  "   Type (Toilet Transfer) stand pivot/stand step  -     Row Name 01/19/22 1001          Motor Skills    Therapeutic Exercise aerobic  -     Row Name 01/19/22 1001          Aerobic Exercise    Comment, Aerobic Exercise (Therapeutic Exercise) BLE AROM x15 reps in sitting  -     Row Name 01/19/22 1001          Plan of Care Review    Plan of Care Reviewed With patient  -     Progress improving  -     Outcome Summary PT tx completed. Pt reports increased fatigue and weakness. Throughout tx monitored HR and ranged 30-130s, unsure if accurate reading. Notified nsg. Pt had moments of inreased fatigue in voice and repeat \"help me.\" Pt states she feels funny but unable to give specific symptoms. SBA for supine to sit. Pt able to stand x5 reps in bed with CGA and RWX. t/f to BSC with CGA and RWX. Completed BLE AROM x15 reps seated. Returned to bed with min x1. Will cont to follow.  -     Row Name 01/19/22 1001          Positioning and Restraints    Pre-Treatment Position in bed  -     Post Treatment Position bed  -     In Bed fowlers; call light within reach; encouraged to call for assist; exit alarm on  -           User Key  (r) = Recorded By, (t) = Taken By, (c) = Cosigned By    Initials Name Provider Type     Annie Young, PTA Physical Therapy Assistant                Physical Therapy Education                 Title: PT OT SLP Therapies (In Progress)     Topic: Physical Therapy (In Progress)     Point: Mobility training (Done)     Learning Progress Summary           Patient Acceptance, E,TB,D, VU,DU,NR by VARUN at 1/18/2022 2056    Comment: Education re: purpose of PT/importance of activity, for improved tech w/ bed mobility, tfers and gait w/ rwx w/ emphasis on safety/falls prevention, proper breathing tech and pacing activity                   Point: Home exercise program (Not Started)     Learner Progress:  Not documented in this visit.          Point: Precautions (Done)     Learning Progress Summary  " "         Patient Acceptance, E,TB,D, VU,DU,NR by VARUN at 1/18/2022 0900    Comment: Education re: purpose of PT/importance of activity, for improved tech w/ bed mobility, tfers and gait w/ rwx w/ emphasis on safety/falls prevention, proper breathing tech and pacing activity                               User Key     Initials Effective Dates Name Provider Type Discipline     08/02/18 -  Maira Dominguez, PT Physical Therapist PT              PT Recommendation and Plan     Plan of Care Reviewed With: patient  Progress: improving  Outcome Summary: PT tx completed. Pt reports increased fatigue and weakness. Throughout tx monitored HR and ranged 30-130s, unsure if accurate reading. Notified nsg. Pt had moments of inreased fatigue in voice and repeat \"help me.\" Pt states she feels funny but unable to give specific symptoms. SBA for supine to sit. Pt able to stand x5 reps in bed with CGA and RWX. t/f to BSC with CGA and RWX. Completed BLE AROM x15 reps seated. Returned to bed with min x1. Will cont to follow.       Time Calculation:    PT Charges     Row Name 01/19/22 1037             Time Calculation    Start Time 1001  -LC      Stop Time 1030  -LC      Time Calculation (min) 29 min  -LC      PT Received On 01/19/22  -LC              Time Calculation- PT    Total Timed Code Minutes- PT 29 minute(s)  -LC              Timed Charges    14714 - PT Therapeutic Exercise Minutes 10  -LC      93437 - PT Therapeutic Activity Minutes 19  -LC              Total Minutes    Timed Charges Total Minutes 29  -LC       Total Minutes 29  -LC            User Key  (r) = Recorded By, (t) = Taken By, (c) = Cosigned By    Initials Name Provider Type     Annie Young PTA Physical Therapy Assistant              Therapy Charges for Today     Code Description Service Date Service Provider Modifiers Qty    49583983035 HC PT THERAPEUTIC ACT EA 15 MIN 1/19/2022 Annie Young PTA GP 1    99641635859 HC PT THER PROC EA 15 MIN 1/19/2022 Annie Young " JEFF, PTA GP 1          PT G-Codes  Outcome Measure Options: AM-PAC 6 Clicks Basic Mobility (PT)  AM-PAC 6 Clicks Score (PT): 18  AM-PAC 6 Clicks Score (OT): 19    Annie Young, PTA  1/19/2022

## 2022-01-19 NOTE — PLAN OF CARE
Goal Outcome Evaluation:           Progress: no change  Outcome Summary: Patient has had no c/o of pain. Up with 1 assist to bsc. PO abx given, Safety Maintained.

## 2022-01-20 NOTE — THERAPY TREATMENT NOTE
Patient Name: Alissa Dominguez  : 1945    MRN: [id number]                              Today's Date: 2022       Admit Date: 2022    Visit Dx:     ICD-10-CM ICD-9-CM   1. Hypoxia  R09.02 799.02   2. Urinary tract infection without hematuria, site unspecified  N39.0 599.0   3. Impaired functional mobility and activity tolerance  Z74.09 V49.89   4. Decreased activities of daily living (ADL)  Z78.9 V49.89     Patient Active Problem List   Diagnosis   • Essential (primary) hypertension   • Breast mass   • Acute cystitis   • Hypothyroidism   • COPD exacerbation ([facility])   • Symptomatic bradycardia   • Urinary tract infection     Past Medical History:   Diagnosis Date   • Cataracts, bilateral    • Cellulitis of left lower limb    • Cellulitis of right lower limb    • COPD (chronic obstructive pulmonary disease) ([facility])    • DJD (degenerative joint disease), cervical     DJD C Spine/Spondylolysis, Cervical Region   • Essential (primary) hypertension    • GERD (gastroesophageal reflux disease)    • Hematoma     Left mastectomy site   • Hypothyroidism    • Major depressive disorder, recurrent, moderate ([facility])    • Malignant neoplasm of left breast ([facility])    • 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, estrogen receptor positive ([facility]) 2021   • Migraine    • Morbid (severe) obesity due to excess calories ([facility])    • Obstructive sleep apnea    • PONV (postoperative nausea and vomiting)    • Primary generalized (osteo)arthritis    • Pure hypercholesterolemia    • Tinea unguium    • Type 2 diabetes mellitus with hyperglycemia ([facility])    • Venous insufficiency (chronic) (peripheral)      Past Surgical History:   Procedure Laterality Date   • BREAST ABSCESS INCISION AND DRAINAGE Left 2021    Procedure: EVACUATION OF LEFT MASTECTOMY HEMATOMA;  Surgeon: Naye Tavarez MD;  Location: [facility];  Service: General;  Laterality: Left;   • BREAST SURGERY Left     Left mastectomy   • CATARACT  EXTRACTION, BILATERAL     • CATARACT EXTRACTION, BILATERAL     • ESSURE TUBAL LIGATION     • HYSTERECTOMY     • MASTECTOMY W/ SENTINEL NODE BIOPSY Left 11/30/2021    Procedure: LEFT MASTECTOMY WITH SENTINEL LYMPH NODE BIOPSY WITH MAGTRACE;  Surgeon: Naye Tavarez MD;  Location: [facility];  Service: General;  Laterality: Left;   • VARICOSE VEIN SURGERY Right       General Information     Row Name 01/20/22 1507          OT Time and Intention    Mode of Treatment occupational therapy  -MT (r) CV (t) MT (c)     Row Name 01/20/22 1507          General Information    Patient Profile Reviewed yes  -MT (r) CV (t) MT (c)     Existing Precautions/Restrictions fall; oxygen therapy device and L/min  -MT (r) CV (t) MT (c)     Barriers to Rehab cognitive status  -MT (r) CV (t) MT (c)     Row Name 01/20/22 1507          Safety Issues, Functional Mobility    Impairments Affecting Function (Mobility) balance; pain  -MT (r) CV (t) MT (c)           User Key  (r) = Recorded By, (t) = Taken By, (c) = Cosigned By    Initials Name Provider Type    MT Ginny Salas COTA Occupational Therapy Assistant    Linn Mcgee OTA Student OT Student                 Mobility/ADL's     Row Name 01/20/22 1507          Transfers    Transfers sit-stand transfer  -MT (r) CV (t) MT (c)     Sit-Stand [place] (Transfers) contact guard  -MT (r) CV (t) MT (c)     Row Name 01/20/22 1507          Sit-Stand Transfer    Assistive Device (Sit-Stand Transfers) walker, front-wheeled  -MT (r) CV (t) MT (c)           User Key  (r) = Recorded By, (t) = Taken By, (c) = Cosigned By    Initials Name Provider Type    MT Ginny Salas COTA Occupational Therapy Assistant    Linn Mcgee OTA Student OT Student               Obj/Interventions     Row Name 01/20/22 1507          Shoulder (Therapeutic Exercise)    Shoulder (Therapeutic Exercise) AROM (active range of motion)  -MT (r) CV (t) MT (c)     Shoulder AROM (Therapeutic Exercise)  10 repetitions; flexion; bilateral; extension; aBduction; aDduction  -MT (r) CV (t) MT (c)     Row Name 01/20/22 1507          Therapeutic Exercise    Therapeutic Exercise shoulder  -MT (r) CV (t) MT (c)           User Key  (r) = Recorded By, (t) = Taken By, (c) = Cosigned By    Initials Name Provider Type    Ginny Rojo COTA Occupational Therapy Assistant    Linn Mcgee OTA Student OT Student               Goals/Plan    No documentation.                Clinical Impression     Row Name 01/20/22 1507          Pain Scale: Numbers Pre/Post-Treatment    Pretreatment Pain Rating 5/10  -MT (r) CV (t) MT (c)     Posttreatment Pain Rating 5/10  -MT (r) CV (t) MT (c)     Pain Location back  -MT (r) CV (t) MT (c)     Row Name 01/20/22 1507          Plan of Care Review    Plan of Care Reviewed With patient  -MT (r) CV (t) MT (c)     Progress improving  -MT (r) CV (t) MT (c)     Outcome Summary Pt required encouragement to participate in OT tx c/o of back pain. CGA for bed mobility sit<>stand with RW. While standing performed BUE ther ex. 10 reps.  Pt is limited by decreased strenght, endurance, and pain. would benefit from SNF for increased functional mobility for ADLs,  -MT (r) CV (t) MT (c)     Row Name 01/20/22 1507          Therapy Plan Review/Discharge Plan (OT)    Anticipated Discharge Disposition (OT) skilled nursing facility  -MT (r) CV (t) MT (c)     Row Name 01/20/22 1507          Vital Signs    Pre Patient Position Supine  -MT (r) CV (t) MT (c)     Post Patient Position Supine  -MT (r) CV (t) MT (c)     [facility] Name 01/20/22 1507          Positioning and Restraints    Pre-Treatment Position in bed  -MT (r) CV (t) MT (c)     Post Treatment Position bed  -MT (r) CV (t) MT (c)     In Bed fowlers; call light within reach; encouraged to call for assist  -MT (r) CV (t) MT (c)           User Key  (r) = Recorded By, (t) = Taken By, (c) = Cosigned By    Initials Name Provider Type    MT Ginny Salas COTA  Occupational Therapy Assistant    Linn Mcgee OTA Student OT Student               Outcome Measures     Row Name 01/20/22 1507          How much help from another is currently needed...    Putting on and taking off regular lower body clothing? 2  -MT (r) CV (t) MT (c)     Bathing (including washing, rinsing, and drying) 3  -MT (r) CV (t) MT (c)     Toileting (which includes using toilet bed pan or urinal) 3  -MT (r) CV (t) MT (c)     Putting on and taking off regular upper body clothing 3  -MT (r) CV (t) MT (c)     Taking care of personal grooming (such as brushing teeth) 3  -MT (r) CV (t) MT (c)     Eating meals 4  -MT (r) CV (t) MT (c)     AM-PAC 6 Clicks Score (OT) 18  -MT (r) CV (t)           User Key  (r) = Recorded By, (t) = Taken By, (c) = Cosigned By    Initials Name Provider Type    MT Ginny Salas COTA Occupational Therapy Assistant    Linn Mcgee OTA Student OT Student                Occupational Therapy Education                 Title: PT OT SLP Therapies (In Progress)     Topic: Occupational Therapy (Done)     Point: ADL training (Done)     Description:   Instruct learner(s) on proper safety adaptation and remediation techniques during self care or transfers.   Instruct in proper use of assistive devices.              Learning Progress Summary           Patient Acceptance, E,TB, VU by CV at 1/20/2022 1507    Acceptance, E,TB, VU by CV at 1/20/2022 1507    Acceptance, E,D, VU,NR by JR at 1/18/2022 0811                   Point: Home exercise program (Done)     Description:   Instruct learner(s) on appropriate technique for monitoring, assisting and/or progressing therapeutic exercises/activities.              Learning Progress Summary           Patient Acceptance, E,TB, VU by CV at 1/20/2022 1507    Acceptance, E,TB, VU by CV at 1/20/2022 1507    Acceptance, E,D, VU,NR by JR at 1/18/2022 0811                   Point: Precautions (Done)     Description:   Instruct learner(s)  on prescribed precautions during self-care and functional transfers.              Learning Progress Summary           Patient Acceptance, E,TB, VU by CV at 1/20/2022 1507    Acceptance, E,TB, VU by CV at 1/20/2022 1507    Acceptance, E,D, VU,NR by JR at 1/18/2022 0811                   Point: Body mechanics (Done)     Description:   Instruct learner(s) on proper positioning and spine alignment during self-care, functional mobility activities and/or exercises.              Learning Progress Summary           Patient Acceptance, E,TB, VU by CV at 1/20/2022 1507    Acceptance, E,TB, VU by CV at 1/20/2022 1507                               User Key     Initials Effective Dates Name Provider Type Discipline     12/28/21 -  Linn Lombardo OTA Student OT Student OT    JR 12/28/21 -  Nia Estes OT Student OT Student OT              OT Recommendation and Plan     Plan of Care Review  Plan of Care Reviewed With: patient  Progress: improving  Outcome Summary: Pt required encouragement to participate in OT tx c/o of back pain. CGA for bed mobility sit<>stand with RW. While standing performed BUE ther ex. 10 reps.  Pt is limited by decreased strenght, endurance, and pain. would benefit from SNF for increased functional mobility for ADLs,     Time Calculation:    Time Calculation- OT     Row Name 01/20/22 1507             Time Calculation- OT    OT Start Time 1507  -MT (r) CV (t) MT (c)      OT Stop Time 1532  -MT (r) CV (t) MT (c)      OT Time Calculation (min) 25 min  -MT (r) CV (t)      Total Timed Code Minutes- OT 25 minute(s)  -MT (r) CV (t) MT (c)              Timed Charges    56758 - OT Therapeutic Exercise Minutes 15  -MT (r) CV (t) MT (c)      08126 - OT Therapeutic Activity Minutes 10  -MT (r) CV (t) MT (c)              Total Minutes    Timed Charges Total Minutes 25  -MT (r) CV (t)       Total Minutes 25  -MT (r) CV (t)            User Key  (r) = Recorded By, (t) = Taken By, (c) = Cosigned By    Initials  Name Provider Type    Ginny Rojo COTA Occupational Therapy Assistant    Linn Mcgee, WERO Student OT Student                       Linn Lombardo, WERO Student  1/20/2022

## 2022-01-20 NOTE — CASE MANAGEMENT/SOCIAL WORK
Continued Stay Note  [facility]     Patient Name: Alissa Dominguez  MRN: [id number]  Today's Date: 1/20/2022    Admit Date: 1/16/2022     Discharge Plan     Row Name 01/20/22 1052       Plan    Plan [facility]    Patient/Family in Agreement with Plan yes    Final Discharge Disposition Code 03 - skilled nursing facility (SNF)    Final Note Pt is going to [facility] [phone] at the skilled level. Will need to fax the d/c summary and rx to [phone]. Informed pt's daughter, Kassidy [phone], of d/c. Pt will go by [facility] [phone].    Addendum: Informed Saida at [facility] that pt tested positive for covid. She said that if pt is not in any distress, they will still take her today.               Discharge Codes    No documentation.               Expected Discharge Date and Time     Expected Discharge Date Expected Discharge Time    Jan 20, 2022             JERMAINE Del Castillo

## 2022-01-20 NOTE — PLAN OF CARE
Goal Outcome Evaluation:  Plan of Care Reviewed With: patient        Progress: improving  Outcome Summary: PT tx completed. Pt  SBA for supine to sit. Pt able to stand with min x1 and RWX, cues for increased anterior weight shift. Pt stood for 45 seconds then needed to sit back down d/t fatigue. Pt completed BLE AROM x20 rep seated, cues required for technique and attention to task. Pt refused further mobility d/t nausea and fatigue. Returned to bed with CGA. Will follow.

## 2022-01-20 NOTE — PLAN OF CARE
"Goal Outcome Evaluation:  Plan of Care Reviewed With: patient        Progress: no change  Outcome Summary: Pt appeared to sleep most of the night. VSS. HR S/ST  with PAC's and FPVC's on tele. No c/o \"feeling funny\" or dizzy. Safety maintained.  "

## 2022-01-20 NOTE — PLAN OF CARE
Goal Outcome Evaluation:  Plan of Care Reviewed With: patient        Progress: improving  Outcome Summary: Pt required encouragement to participate in OT tx c/o of back pain. CGA for bed mobility sit<>stand with RW. While standing performed BUE ther ex. 10 reps.  Pt is limited by decreased strenght, endurance, and pain. would benefit from SNF for increased functional mobility for ADLs,

## 2022-01-20 NOTE — PLAN OF CARE
Goal Outcome Evaluation:  Plan of Care Reviewed With: patient        Patient resting comfortably/no c/o pain. Patient answers orientation questions correctly, but does appear confused at times. IID, voiding small amounts/incontinent. Bladder scan 91cc. Regular diet - encourage oral intake. PT/OT following. Pt HR 30-130s when working with PT. Assessed, VS complete, pt stated she felt dizzy/weak, tele applied - frequent PACs & PVCs. MD notified & cardiology consult complete. Orthostatic b/p complete. Safety maintained

## 2022-01-20 NOTE — DISCHARGE SUMMARY
"    [facility] Medicine Services  DISCHARGE SUMMARY       Date of Admission: 1/16/2022  Date of Discharge:  1/20/2022  Primary Care Physician: Pb Mitchell APRN    Discharge Diagnoses:  Active Hospital Problems    Diagnosis    • **COPD exacerbation (HCC)    • Symptomatic bradycardia    • Urinary tract infection    • Acute cystitis    • Hypothyroidism    • Breast mass    • Essential (primary) hypertension          Presenting Problem/History of Present Illness:  Hypoxia [R09.02]     Chief Complaint on Day of Discharge:   Shortness of breath    History of Present Illness on Day of Discharge:   Patient is alert and talkative.  She remains on oral antibiotics.  She is mildly confused which is her typical baseline.  She has not demonstrated any evidence of bradycardia.  She is stable for discharge back to skilled nursing facility today.  Cardiology has signed off the case.    Hospital Course  76-year-old female who is a chronic resident at a local nursing facility.  She was initially brought in for \"back pain.\".  The patient does admit to some chronic lower back pain, but on arrival to the nursing facility, EMS noted the patient was hypoxic with an O2 in the 80s.  She was placed on 2 L nasal cannula and transported to our emergency department.  On removal of the 2 L nasal cannula, the patient returned her O2 saturation into the 80s.  She does not chronically wear oxygen.  Patient was also found to have a urinary tract infection.  Patient denies chest pain or shortness of breath.  She does have confusion, and is a poor historian.  Plan:   1. O2 support  2.  Levaquin  3.  Anemia profile  4.  A1c in the morning, to include morning labs  5.  Resume appropriate home medications  6.  Neurochecks  7.  Fall/skin/and aspiration precautions  8.  PT OT consult  9.  Gentle IV hydration  10.  The wound care to the right pretibial wound    Apparently the patient was returned to the hospital because " of shortness of breath.  She had recently been hospitalized for AMS felt related to hyponatremia and pain medications.  CTA of the chest in the ER showed no acute disease.  She was placed on 2 L of oxygen in the ER with resolution of hypoxia.  UTI was treated with Levaquin.  Follow-up cultures revealed E. coli sensitive to cephalosporins with Levaquin resistance and the patient was transitioned to oral cephalosporin.  She was noted to be more alert on 1/18.  She was converted to oral antibiotics at that time.  She did well with supplemental oxygen.  Hypoxia was felt to be secondary to chronic lung disease.  She will likely require supplemental oxygen permanently.  She has tolerated 2 to 3 L with good maintenance of saturations.  She has noted to be stable for discharge back to SNF when a bed is available.  The patient worked with physical therapy and had an episode of lightheadedness.  Pulse oximetry showed possible bradycardia.  Cardiology was consulted.  The patient was noted to have PACs which were not registering on the pulse oximeter thus appearing to be a lower heart rate than was actually occurring.  Cardiology evaluated the patient and subsequently signed off.  She has stable for discharge to skilled nursing facility.    Consults:   Cardiology:  Assessment/Plan           COPD exacerbation (HCC)    Essential (primary) hypertension    Breast mass    Acute cystitis    Hypothyroidism    Symptomatic bradycardia    Urinary tract infection        Plan:   Symptomatic Bradycardia: was placed on telemetry after suspected bradycardia per pulse ox. Rates remain  with frequent PACs and PVCs. No evidence of bradycardia thus far. Will continue to monitor telemetry. Not currently taking BB therapy. Low reading per pulse ox could be secondary to frequent PACs and PVCs.      COPD exacerbation: treatment per attending.      UTI: currently on PO levaquin.      Further orders per Dr. Lucia     Thank you for asking us to  follow this patient with you.      Electronically signed by HERMES Simmons, 01/19/22, 12:12 PM CST.     Please note this cardiology consultation note is the result of a face to face consultation with the patient, in addition to reviewing medical records at length by myself, HERMES Foote      Time: approximately 45 minutes                        Cosigned by: Dany Lucia MD   Assessment:  1.  COPD exacerbation  2.  UTI  3.  Mechanical bradycardia: Reported bradycardia on pulse oximetry is likely secondary to mechanical bradycardia due to the frequent PACs and PVCs being counted as an isolated beat.  This does not represent true bradycardia.  There has been no bradycardia observed on telemetry.  4.  Paroxysmal SVT  5.  Orthostasis: Dizziness upon standing likely secondary to orthostasis.  6.  DVT     Plan:  -Monitor on telemetry.  -Check orthostatic vital signs.  -Continue treatment of urinary tract infection and COPD exacerbation per primary.  -Continue anticoagulation with enoxaparin.  Transition to oral anticoagulation at or prior to discharge.      Pertinent Test Results:   Lab Results (last 7 days)     Procedure Component Value Units Date/Time    Blood Culture - Blood, Hand, Left [[id number]]  (Normal) Collected: 01/16/22 1648    Specimen: Blood from Hand, Left Updated: 01/19/22 1715     Blood Culture No growth at 3 days    Blood Culture - Blood, Arm, Left [[id number]]  (Normal) Collected: 01/16/22 1648    Specimen: Blood from Arm, Left Updated: 01/19/22 1715     Blood Culture No growth at 3 days    Urine Culture - Urine, Urine, Clean Catch [[id number]]  (Abnormal)  (Susceptibility) Collected: 01/16/22 1907    Specimen: Urine, Clean Catch Updated: 01/18/22 0811     Urine Culture >100,000 CFU/mL Escherichia coli    Susceptibility      Escherichia coli     GINA     Ampicillin Intermediate     Ampicillin + Sulbactam Susceptible     Cefazolin Susceptible     Cefepime Susceptible     Ceftazidime  Susceptible     Ceftriaxone Susceptible     Gentamicin Susceptible     Levofloxacin Resistant     Nitrofurantoin Susceptible     Piperacillin + Tazobactam Susceptible     Trimethoprim + Sulfamethoxazole Susceptible                  Linear View                   Comprehensive Metabolic Panel [[id number]]  (Abnormal) Collected: 01/17/22 0541    Specimen: Blood Updated: 01/17/22 0639     Glucose 107 mg/dL      BUN 16 mg/dL      Creatinine 0.57 mg/dL      Sodium 143 mmol/L      Potassium 3.6 mmol/L      Chloride 103 mmol/L      CO2 28.0 mmol/L      Calcium 8.7 mg/dL      Total Protein 6.5 g/dL      Albumin 3.70 g/dL      ALT (SGPT) 12 U/L      AST (SGOT) 21 U/L      Alkaline Phosphatase 60 U/L      Total Bilirubin 0.7 mg/dL      eGFR Non African Amer 103 mL/min/1.73      Globulin 2.8 gm/dL      A/G Ratio 1.3 g/dL      BUN/Creatinine Ratio 28.1     Anion Gap 12.0 mmol/L     Narrative:      GFR Normal >60  Chronic Kidney Disease <60  Kidney Failure <15      Troponin [[id number]]  (Normal) Collected: 01/17/22 0541    Specimen: Blood Updated: 01/17/22 0639     Troponin T <0.010 ng/mL     Narrative:      Troponin T Reference Range:  <= 0.03 ng/mL-   Negative for AMI  >0.03 ng/mL-     Abnormal for myocardial necrosis.  Clinicians would have to utilize clinical acumen, EKG, Troponin and serial changes to determine if it is an Acute Myocardial Infarction or myocardial injury due to an underlying chronic condition.       Results may be falsely decreased if patient taking Biotin.      Iron Profile [[id number]]  (Abnormal) Collected: 01/17/22 0541    Specimen: Blood Updated: 01/17/22 0639     Iron 22 mcg/dL      Iron Saturation 5 %      Transferrin 290 mg/dL      TIBC 432 mcg/dL     CBC Auto Differential [[id number]]  (Abnormal) Collected: 01/17/22 0541    Specimen: Blood Updated: 01/17/22 0619     WBC 6.25 10*3/mm3      RBC 5.26 10*6/mm3      Hemoglobin 12.6 g/dL      Hematocrit 41.6 %      MCV 79.1 fL      MCH 24.0 pg      MCHC 30.3  g/dL      RDW 16.9 %      RDW-SD 47.3 fl      MPV 9.9 fL      Platelets 297 10*3/mm3      Neutrophil % 73.0 %      Lymphocyte % 13.8 %      Monocyte % 11.7 %      Eosinophil % 0.8 %      Basophil % 0.5 %      Immature Grans % 0.2 %      Neutrophils, Absolute 4.57 10*3/mm3      Lymphocytes, Absolute 0.86 10*3/mm3      Monocytes, Absolute 0.73 10*3/mm3      Eosinophils, Absolute 0.05 10*3/mm3      Basophils, Absolute 0.03 10*3/mm3      Immature Grans, Absolute 0.01 10*3/mm3      nRBC 0.0 /100 WBC     Urinalysis With Culture If Indicated - Urine, Clean Catch [[id number]]  (Abnormal) Collected: 01/16/22 1907    Specimen: Urine, Clean Catch Updated: 01/16/22 1954     Color, UA Dark Yellow     Appearance, UA Cloudy     pH, UA <=5.0     Specific Gravity, UA >1.030     Glucose, UA Negative     Ketones, UA Trace     Bilirubin, UA Small (1+)     Blood, UA Small (1+)     Protein, UA >=300 mg/dL (3+)     Leuk Esterase, UA Trace     Nitrite, UA Positive     Urobilinogen, UA 1.0 E.U./dL    Urinalysis, Microscopic Only - Urine, Clean Catch [[id number]]  (Abnormal) Collected: 01/16/22 1907    Specimen: Urine, Clean Catch Updated: 01/16/22 1954     RBC, UA 0-2 /HPF      WBC, UA 13-20 /HPF      Bacteria, UA 4+ /HPF      Squamous Epithelial Cells, UA 3-6 /HPF      Hyaline Casts, UA None Seen /LPF      Methodology Manual Light Microscopy    Influenza Antigen, Rapid - Swab, Nasopharynx [[id number]]  (Normal) Collected: 01/16/22 1647    Specimen: Swab from Nasopharynx Updated: 01/16/22 1821     Influenza A Ag, EIA Negative     Influenza B Ag, EIA Negative    Narrative:      Recommend confirmation of negative results by viral culture or molecular assay.    COVID PRE-OP / PRE-PROCEDURE SCREENING ORDER (NO ISOLATION) - Swab, Nasal Cavity [[id number]]  (Normal) Collected: 01/16/22 1647    Specimen: Swab from Nasal Cavity Updated: 01/16/22 1816    Narrative:      The following orders were created for panel order COVID PRE-OP / PRE-PROCEDURE  SCREENING ORDER (NO ISOLATION) - Swab, Nasal Cavity.  Procedure                               Abnormality         Status                     ---------                               -----------         ------                     COVID-19,[facility] [employer] [facility]...[[id number]]  Normal              Final result                 Please view results for these tests on the individual orders.    COVID-19,[facility] IN-HOUSE,Nasal Swab No Transport Media 3-4 HR TAT - Swab, Nasal Cavity [[id number]]  (Normal) Collected: 01/16/22 1647    Specimen: Swab from Nasal Cavity Updated: 01/16/22 1816     COVID19 Not Detected    Narrative:      Fact sheet for providers: https://www.fda.gov/media/952377/download     Fact sheet for patients: https://www.fda.gov/media/262883/download    Test performed by PCR.    Consider negative results in combination with clinical observations, patient history, and epidemiological information.  Fact sheet for providers: https://www.fda.gov/media/303446/download     Fact sheet for patients: https://www.fda.gov/media/220220/download    Test performed by PCR.    Consider negative results in combination with clinical observations, patient history, and epidemiological information.    D-dimer, Quantitative [[id number]]  (Abnormal) Collected: 01/16/22 1648    Specimen: Blood Updated: 01/16/22 1811     D-Dimer, Quantitative 1.60 mg/L (FEU)     Narrative:      Reference Range is 0-0.50 mg/L FEU. However, results <0.50 mg/L FEU tends to rule out DVT or PE. Results >0.50 mg/L FEU are not useful in predicting absence or presence of DVT or PE.      Protime-INR [[id number]]  (Abnormal) Collected: 01/16/22 1648    Specimen: Blood Updated: 01/16/22 1740     Protime 14.7 Seconds      INR 1.20    Procalcitonin [[id number]]  (Normal) Collected: 01/16/22 1648    Specimen: Blood Updated: 01/16/22 1734     Procalcitonin 0.10 ng/mL     Narrative:      As a Marker for Sepsis (Non-Neonates):     1. <0.5 ng/mL represents a low risk of severe  "sepsis and/or septic shock.  2. >2 ng/mL represents a high risk of severe sepsis and/or septic shock.    As a Marker for Lower Respiratory Tract Infections that require antibiotic therapy:  PCT on Admission     Antibiotic Therapy             6-12 Hrs later  >0.5                          Strongly Recommended            >0.25 - <0.5             Recommended  0.1 - 0.25                  Discouraged                       Remeasure/reassess PCT  <0.1                         Strongly Discouraged         Remeasure/reassess PCT      As 28 day mortality risk marker: \"Change in Procalcitonin Result\" (>80% or <=80%) if Day 0 (or Day 1) and Day 4 values are available. Refer to http://www.[employer]pct-calculator.com/    Change in PCT <=80 %   A decrease of PCT levels below or equal to 80% defines a positive change in PCT test result representing a higher risk for 28-day all-cause mortality of patients diagnosed with severe sepsis or septic shock.    Change in PCT >80 %   A decrease of PCT levels of more than 80% defines a negative change in PCT result representing a lower risk for 28-day all-cause mortality of patients diagnosed with severe sepsis or septic shock.                Comprehensive Metabolic Panel [[id number]]  (Abnormal) Collected: 01/16/22 1648    Specimen: Blood Updated: 01/16/22 1729     Glucose 169 mg/dL      BUN 18 mg/dL      Creatinine 0.71 mg/dL      Sodium 143 mmol/L      Potassium 3.8 mmol/L      Chloride 104 mmol/L      CO2 28.0 mmol/L      Calcium 9.4 mg/dL      Total Protein 7.0 g/dL      Albumin 4.00 g/dL      ALT (SGPT) 11 U/L      AST (SGOT) 19 U/L      Alkaline Phosphatase 66 U/L      Total Bilirubin 0.6 mg/dL      eGFR Non African Amer 80 mL/min/1.73      Globulin 3.0 gm/dL      A/G Ratio 1.3 g/dL      BUN/Creatinine Ratio 25.4     Anion Gap 11.0 mmol/L     Narrative:      GFR Normal >60  Chronic Kidney Disease <60  Kidney Failure <15      Troponin [[id number]]  (Normal) Collected: 01/16/22 1648    " Specimen: Blood Updated: 01/16/22 1724     Troponin T <0.010 ng/mL     Narrative:      Troponin T Reference Range:  <= 0.03 ng/mL-   Negative for AMI  >0.03 ng/mL-     Abnormal for myocardial necrosis.  Clinicians would have to utilize clinical acumen, EKG, Troponin and serial changes to determine if it is an Acute Myocardial Infarction or myocardial injury due to an underlying chronic condition.       Results may be falsely decreased if patient taking Biotin.      Lipase [[id number]]  (Normal) Collected: 01/16/22 1648    Specimen: Blood Updated: 01/16/22 1724     Lipase 16 U/L     Lactic Acid, Plasma [[id number]]  (Normal) Collected: 01/16/22 1648    Specimen: Blood Updated: 01/16/22 1719     Lactate 1.8 mmol/L     CBC & Differential [[id number]]  (Abnormal) Collected: 01/16/22 1648    Specimen: Blood Updated: 01/16/22 1706    Narrative:      The following orders were created for panel order CBC & Differential.  Procedure                               Abnormality         Status                     ---------                               -----------         ------                     CBC Auto Differential[[id number]]        Abnormal            Final result                 Please view results for these tests on the individual orders.    CBC Auto Differential [[id number]]  (Abnormal) Collected: 01/16/22 1648    Specimen: Blood Updated: 01/16/22 1706     WBC 5.82 10*3/mm3      RBC 5.54 10*6/mm3      Hemoglobin 13.0 g/dL      Hematocrit 43.6 %      MCV 78.7 fL      MCH 23.5 pg      MCHC 29.8 g/dL      RDW 17.2 %      RDW-SD 48.1 fl      MPV 9.4 fL      Platelets 332 10*3/mm3      Neutrophil % 71.0 %      Lymphocyte % 16.0 %      Monocyte % 11.3 %      Eosinophil % 0.9 %      Basophil % 0.5 %      Immature Grans % 0.3 %      Neutrophils, Absolute 4.13 10*3/mm3      Lymphocytes, Absolute 0.93 10*3/mm3      Monocytes, Absolute 0.66 10*3/mm3      Eosinophils, Absolute 0.05 10*3/mm3      Basophils, Absolute 0.03 10*3/mm3       Immature Grans, Absolute 0.02 10*3/mm3      nRBC 0.0 /100 WBC         Imaging Results (Last 7 Days)     Procedure Component Value Units Date/Time    US Venous Doppler Lower Extremity Bilateral (duplex) [[id number]] Collected: 01/18/22 1605     Updated: 01/18/22 1609    Narrative:      History: Swelling       Impression:      Impression: There is evidence of deep venous thrombosis in bilateral  lower extremities.     Comments: Bilateral lower extremity venous duplex exam was performed  using color Doppler flow, Doppler waveform analysis, and grayscale  imaging, with and without compression. There is evidence of nonocclusive  deep venous thrombosis in the popliteal vein in the right lower  extremity. There is also evidence of deep venous thrombosis in the  posterior tibial vein in the left lower extremity. No thrombus is  identified in the saphenofemoral junctions and greater saphenous veins  bilaterally.         This report was finalized on 01/18/2022 16:06 by Dr. Kwame Corado MD.    CT Angiogram Chest [[id number]] Collected: 01/16/22 2028     Updated: 01/16/22 2033    Narrative:      EXAMINATION: CT ANGIOGRAM CHEST-      1/16/2022 8:04 PM CST     HISTORY: PE suspected, low/intermediate prob, positive D-dimer     In order to have a CT radiation dose as low as reasonably achievable  Automated Exposure Control was utilized for adjustment of the mA and/or  KV according to patient size.     DLP in mGycm= 338.     CT angiogram chest.  CT angiography protocol.   CT imaging with bolus IV contrast injection.   Under concurrent supervision axial, sagittal, coronal, and  three-dimensional data sets were constructed.     Normal heart size.  Coronary artery calcification.  Thoracic aorta calcification.     Symmetric and normally opacified pulmonary arteries.  No pulmonary embolism.     Chronic interstitial lung disease.  Mild dependent atelectasis.  No pneumonia, pneumothorax, or pleural effusion.     Summary:  1. No  "pulmonary embolism.  2. Chronic lung changes.                                   This report was finalized on 01/16/2022 20:30 by Dr. López Tolentino MD.    XR Chest 1 View [[id number]] Collected: 01/16/22 1655     Updated: 01/16/22 1659    Narrative:      EXAMINATION: XR CHEST 1 VW-     1/16/2022 4:23 PM CST     HISTORY: Shortness of breath.     One view chest x-ray.     Comparison is made with January 8, 2022.     Heart size is magnified though there does appear to be mild  cardiomegaly.  Aortic arch calcification is present.     Chronic lung changes with scattered granulomas.     No focal consolidation.     No pneumothorax or heart failure.     Summary:  1. Stable chronic lung changes.     This report was finalized on 01/16/2022 16:56 by Dr. López Tolentino MD.              Condition on Discharge:    Stable and improved    Physical Exam on Discharge:  /86 (BP Location: Right arm, Patient Position: Lying)   Pulse 119   Temp 98.5 °F (36.9 °C) (Oral)   Resp 16   Ht 172.7 cm (68\")   Wt 83.6 kg (184 lb 4.9 oz)   LMP  (LMP Unknown)   SpO2 94%   BMI 28.02 kg/m²   Physical Exam     Constitutional:       Appearance: Normal appearance.   HENT:      Head: Normocephalic and atraumatic.      Right Ear: External ear normal.      Left Ear: External ear normal.      Nose: Nose normal.      Mouth: Mucous membranes are moist.    Eyes:      Conjunctiva/sclera: Conjunctivae normal.   Cardiovascular:      Rate and Rhythm: Regular rhythm.      Pulses: Normal pulses.      Heart sounds: Normal heart sounds.   Pulmonary:      Effort: Pulmonary effort is normal. No respiratory distress.      Breath sounds: Normal breath sounds.   Abdominal:      General: Abdomen is flat. Bowel sounds are normal.      Palpations: Abdomen is soft. There is no mass.   Musculoskeletal:      Right lower leg: Edema present.      Left lower leg: Edema present.   Skin:     General: Skin is warm and dry.      Coloration: Skin is not pale.   Neurological: "      General: No focal deficit present.      Mental Status: She is alert and oriented to person, place, and time. Mental status is at baseline.   Psychiatric:         Mood and Affect: Mood normal.     Discharge Disposition:  Skilled Nursing Facility (DC - External)    Discharge Medications:     Discharge Medications      New Medications      Instructions Start Date   apixaban 5 MG tablet tablet  Commonly known as: ELIQUIS   5 mg, Oral, Every 12 Hours Scheduled      cefdinir 300 MG capsule  Commonly known as: OMNICEF   300 mg, Oral, Every 12 Hours Scheduled         Continue These Medications      Instructions Start Date   acetaminophen 325 MG tablet  Commonly known as: Tylenol   975 mg, Oral, Every 8 Hours, Take every 8 hours for 3 days then take prn as needed.      docusate sodium 100 MG capsule  Commonly known as: COLACE   100 mg, Oral, 2 Times Daily      Euthyrox 75 MCG tablet  Generic drug: levothyroxine   75 mcg, Oral, Daily      ondansetron 4 MG tablet  Commonly known as: Zofran   4 mg, Oral, Every 8 Hours PRN      oxyCODONE-acetaminophen  MG per tablet  Commonly known as: PERCOCET   1 tablet, Oral, Every 8 Hours PRN      triamterene-hydrochlorothiazide 75-50 MG per tablet  Commonly known as: MAXZIDE   1 tablet, Oral, Daily      valsartan 80 MG tablet  Commonly known as: DIOVAN   80 mg, Oral, Daily             Discharge Diet:   Diet Instructions     Diet: Regular; Thin      Discharge Diet: Regular    Fluid Consistency: Thin          Discharge Care Plan / Instructions:   Discharge to skilled nursing facility to continue outpatient dialysis    Activity at Discharge:   Activity Instructions     Activity as Tolerated             Electronically signed by Damián Briceño DO, 01/20/22, 09:11 CST.    Time: Discharge over 30 min    Part of this note may be an electronic transcription/translation of spoken language to printed text using the Dragon Dictation system.

## 2022-01-20 NOTE — PROGRESS NOTES
[facility] -  Progress Note     LOS: 0 days   Patient Care Team:  Pb Mitchell APRN as PCP - General (Family Medicine)  Pb Mitchell APRN as Referring Physician (Family Medicine)  Martin Vasquez DO as Cardiologist (Cardiology)  Naye Tavarez MD as Consulting Physician (General Surgery)    Chief Complaint: follow up concern for bradycardia, PSVT    Subjective     Interval History:     Patient Complaints: The patient is currently resting comfortably in bed.  She is somewhat lethargic, but is able to answer most questions appropriately.  She reports her shortness of breath is persistent but stable.  She denies any chest pain or edema.  She denies any palpitations, dizziness, lightheadedness    Per review of telemetry she is in normal sinus rhythm with heart rates ranging from 60s to 120s, with frequent PVCs.  I do not see any evidence of bradycardia.    I cannot find orthostatic blood pressures recorded, as ordered yesterday by Dr. Lucia but per report from the nurse her blood pressure actually elevated when she went from lying, to sitting, and standing (blood pressure elevated with each subsequent reading, with the highest reading being the standing blood pressure per report from the nurse).    Review of Systems:     Review of Systems   Constitutional: Positive for fatigue. Negative for diaphoresis, fever and unexpected weight change.   HENT: Negative for nosebleeds.    Respiratory: Positive for shortness of breath. Negative for apnea, cough, chest tightness and wheezing.    Cardiovascular: Negative for chest pain, palpitations and leg swelling.   Gastrointestinal: Negative for abdominal distention, nausea and vomiting.   Genitourinary: Negative for hematuria.   Musculoskeletal: Negative for gait problem.   Skin: Negative for color change.   Neurological: Negative for dizziness, syncope, weakness and light-headedness.     Objective     Vital Sign Min/Max for last 24  hours  Temp  Min: 97.6 °F (36.4 °C)  Max: 98.5 °F (36.9 °C)   BP  Min: 104/69  Max: 155/80   Pulse  Min: 57  Max: 119   Resp  Min: 16  Max: 16   SpO2  Min: 92 %  Max: 95 %   No data recorded   No data recorded         01/17/22  0100   Weight: 83.6 kg (184 lb 4.9 oz)       Physical Exam:    Vitals and nursing note reviewed.   Constitutional:       General: Not in acute distress.     Appearance: Well-developed and not in distress. Not diaphoretic.   Neck:      Vascular: No JVD.   Pulmonary:      Effort: Pulmonary effort is normal. No respiratory distress.      Breath sounds: Normal breath sounds.   Cardiovascular:      Normal rate. Irregular rhythm.      Murmurs: There is no murmur.   Edema:     Peripheral edema absent.   Abdominal:      Tenderness: There is no abdominal tenderness.   Skin:     General: Skin is warm and dry.   Neurological:      Mental Status: Alert and oriented to person, place, and time.       Results Review:   Lab Results (last 72 hours)     Procedure Component Value Units Date/Time    Blood Culture - Blood, Hand, Left [[id number]]  (Normal) Collected: 01/16/22 1648    Specimen: Blood from Hand, Left Updated: 01/19/22 1715     Blood Culture No growth at 3 days    Blood Culture - Blood, Arm, Left [[id number]]  (Normal) Collected: 01/16/22 1648    Specimen: Blood from Arm, Left Updated: 01/19/22 1715     Blood Culture No growth at 3 days    Urine Culture - Urine, Urine, Clean Catch [[id number]]  (Abnormal)  (Susceptibility) Collected: 01/16/22 1907    Specimen: Urine, Clean Catch Updated: 01/18/22 0811     Urine Culture >100,000 CFU/mL Escherichia coli    Susceptibility      Escherichia coli     GINA     Ampicillin Intermediate     Ampicillin + Sulbactam Susceptible     Cefazolin Susceptible     Cefepime Susceptible     Ceftazidime Susceptible     Ceftriaxone Susceptible     Gentamicin Susceptible     Levofloxacin Resistant     Nitrofurantoin Susceptible     Piperacillin + Tazobactam Susceptible      Trimethoprim + Sulfamethoxazole Susceptible                  Linear View                             Echo EF Estimated  No results found for: ECHOEFEST      Cath Ejection Fraction Quantitative  No results found for: CATHEF        Medication Review: yes  Current Facility-Administered Medications   Medication Dose Route Frequency Provider Last Rate Last Admin   • acetaminophen (TYLENOL) tablet 975 mg  975 mg Oral Q8H Christine Foy DO   975 mg at 01/20/22 0616   • apixaban (ELIQUIS) tablet 5 mg  5 mg Oral Q12H Damián Briceño DO   5 mg at 01/20/22 0616   • cefdinir (OMNICEF) capsule 300 mg  300 mg Oral Q12H Damián Briceño DO   300 mg at 01/20/22 0839   • docusate sodium (COLACE) capsule 100 mg  100 mg Oral BID PRN Christine Foy DO       • levothyroxine (SYNTHROID, LEVOTHROID) tablet 75 mcg  75 mcg Oral Q AM Christine Foy DO   75 mcg at 01/20/22 0616   • ondansetron (ZOFRAN) tablet 4 mg  4 mg Oral Q8H PRN Christine Foy DO       • oxyCODONE-acetaminophen (PERCOCET) 5-325 MG per tablet 1 tablet  1 tablet Oral Q6H PRN Christine Foy DO       • sodium chloride 0.9 % flush 10 mL  10 mL Intravenous PRN Aldo Blevins, APRN       • sodium chloride 0.9 % flush 10 mL  10 mL Intravenous Q12H Christine Foy DO   10 mL at 01/20/22 0839   • sodium chloride 0.9 % flush 10 mL  10 mL Intravenous PRN Christine Foy DO       • valsartan (DIOVAN) tablet 80 mg  80 mg Oral Daily Christine Foy DO   80 mg at 01/20/22 0839         Assessment/Plan     1.  COPD exacerbation-treatment per primary team  2.  UTI-treatment per primary team  3.  Concern for bradycardia: Reported bradycardia based on reading from pulse oximetry.  It is felt that this was likely not true bradycardia, but mechanical bradycardia due to frequent PACs and PVCs.  Per review of telemetry again today, I see no evidence of bradycardia.  See notes above.  Monitor telemetry.  4.  Paroxysmal SVT: Normal sinus  rhythm, with frequent PVCs and heart rates 60s to 120s on telemetry.  Monitor telemetry.  5.  Orthostasis: She does report some dizziness upon standing, but per report from the nurse no evidence of orthostatic hypotension with orthostatic blood pressure readings, although these readings are not currently available to me.  6.  DVT- anticoagulated per admitting team    Cardiology will sign off, recall as needed.  Thanks.    Carlene Edouard, APRN  01/20/22  09:17 CST

## 2022-01-20 NOTE — PROGRESS NOTES
[facility] Medicine Services  INPATIENT PROGRESS NOTE    Length of Stay: 0  Date of Admission: 1/16/2022  Primary Care Physician: Pb Mitchell APRN    Subjective     Chief Complaint:     Shortness of breath    HPI     The patient remains alert today.  Urine cultures positive for E. coli sensitive to Levaquin.  She continues on oral Levaquin today.  Nurses evaluated the patient earlier when she was ambulating secondary to a feeling of lightheadedness.  Pulse was checked with an oximeter showing heart rate of 30.  Vital signs were not taken in a traditional manner.  She was subsequently placed on telemetry.  The patient was having PACs and PVCs which were not being registered and conducted through the oximeter.  In actuality, her heart rate was higher than the stated value.  Since being on telemetry, her heart rate has remained .  Insurance precertification has been approved and she is stable for transition to skilled nursing facility tomorrow for rehabilitation.  She will continue Levaquin for an entire course.  Lightheadedness was likely secondary to orthostasis.    Review of Systems     All pertinent negatives and positives are as above. All other systems have been reviewed and are negative unless otherwise stated.     Objective    Temp:  [97.3 °F (36.3 °C)-98.7 °F (37.1 °C)] 97.8 °F (36.6 °C)  Heart Rate:  [] 85  Resp:  [16-20] 16  BP: ()/(69-95) 123/87    Lab Results (last 24 hours)     Procedure Component Value Units Date/Time    Blood Culture - Blood, Hand, Left [[id number]]  (Normal) Collected: 01/16/22 1648    Specimen: Blood from Hand, Left Updated: 01/19/22 1715     Blood Culture No growth at 3 days    Blood Culture - Blood, Arm, Left [[id number]]  (Normal) Collected: 01/16/22 1648    Specimen: Blood from Arm, Left Updated: 01/19/22 1715     Blood Culture No growth at 3 days          Imaging Results (Last 24 Hours)     ** No results found for the  last 24 hours. **             Intake/Output Summary (Last 24 hours) at 1/19/2022 1928  Last data filed at 1/19/2022 1514  Gross per 24 hour   Intake 480 ml   Output 125 ml   Net 355 ml       Physical Exam  Constitutional:       Appearance: Normal appearance.   HENT:      Head: Normocephalic and atraumatic.      Right Ear: External ear normal.      Left Ear: External ear normal.      Nose: Nose normal.      Mouth: Mucous membranes are moist.    Eyes:      Conjunctiva/sclera: Conjunctivae normal.   Cardiovascular:      Rate and Rhythm: Regular rhythm.      Pulses: Normal pulses.      Heart sounds: Normal heart sounds.   Pulmonary:      Effort: Pulmonary effort is normal. No respiratory distress.      Breath sounds: Normal breath sounds.   Abdominal:      General: Abdomen is flat. Bowel sounds are normal.      Palpations: Abdomen is soft. There is no mass.   Musculoskeletal:      Right lower leg: Edema present.      Left lower leg: Edema present.   Skin:     General: Skin is warm and dry.      Coloration: Skin is not pale.   Neurological:      General: No focal deficit present.      Mental Status: She is alert and oriented to person, place, and time. Mental status is at baseline.   Psychiatric:         Mood and Affect: Mood normal.     Results Review:  I have reviewed the labs, radiology results, and diagnostic studies since my last progress note and made treatment changes reflective of the results.   I have reviewed the current medications.    Assessment/Plan     Active Hospital Problems    Diagnosis    • **COPD exacerbation (HCC)    • Symptomatic bradycardia    • Urinary tract infection    • Acute cystitis    • Hypothyroidism    • Breast mass    • Essential (primary) hypertension        PLAN:  Continue oxygen supplementation at discharge  Continue oral Levaquin  Return to skilled nursing facility tomorrow    Electronically signed by Damián Briceño DO, 01/19/22, 19:28 CST.

## 2022-01-20 NOTE — THERAPY TREATMENT NOTE
Acute Care - Physical Therapy Treatment Note  [facility]     Patient Name: Alissa Dominguez  : 1945  MRN: [id number]  Today's Date: 2022      Visit Dx:     ICD-10-CM ICD-9-CM   1. Hypoxia  R09.02 799.02   2. Urinary tract infection without hematuria, site unspecified  N39.0 599.0   3. Impaired functional mobility and activity tolerance  Z74.09 V49.89   4. Decreased activities of daily living (ADL)  Z78.9 V49.89     Patient Active Problem List   Diagnosis   • Essential (primary) hypertension   • Breast mass   • Acute cystitis   • Hypothyroidism   • COPD exacerbation ([facility])   • Symptomatic bradycardia   • Urinary tract infection     Past Medical History:   Diagnosis Date   • Cataracts, bilateral    • Cellulitis of left lower limb    • Cellulitis of right lower limb    • COPD (chronic obstructive pulmonary disease) ([facility])    • DJD (degenerative joint disease), cervical     DJD C Spine/Spondylolysis, Cervical Region   • Essential (primary) hypertension    • GERD (gastroesophageal reflux disease)    • Hematoma     Left mastectomy site   • Hypothyroidism    • Major depressive disorder, recurrent, moderate ([facility])    • Malignant neoplasm of left breast ([facility])    • 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, estrogen receptor positive ([facility]) 2021   • Migraine    • Morbid (severe) obesity due to excess calories ([facility])    • Obstructive sleep apnea    • PONV (postoperative nausea and vomiting)    • Primary generalized (osteo)arthritis    • Pure hypercholesterolemia    • Tinea unguium    • Type 2 diabetes mellitus with hyperglycemia ([facility])    • Venous insufficiency (chronic) (peripheral)      Past Surgical History:   Procedure Laterality Date   • BREAST ABSCESS INCISION AND DRAINAGE Left 2021    Procedure: EVACUATION OF LEFT MASTECTOMY HEMATOMA;  Surgeon: Naye Tavarez MD;  Location: [facility];  Service: General;  Laterality: Left;   • BREAST SURGERY Left     Left mastectomy   •  CATARACT EXTRACTION, BILATERAL     • CATARACT EXTRACTION, BILATERAL     • ESSURE TUBAL LIGATION     • HYSTERECTOMY     • MASTECTOMY W/ SENTINEL NODE BIOPSY Left 11/30/2021    Procedure: LEFT MASTECTOMY WITH SENTINEL LYMPH NODE BIOPSY WITH MAGTRACE;  Surgeon: Naye Tavarez MD;  Location: [facility];  Service: General;  Laterality: Left;   • VARICOSE VEIN SURGERY Right      PT Assessment (last 12 hours)     PT Evaluation and Treatment     Row Name 01/20/22 1321          Physical Therapy Time and Intention    Subjective Information complains of; weakness; fatigue; nausea/vomiting  -     Document Type therapy note (daily note)  -     Mode of Treatment physical therapy  -     Row Name 01/20/22 1321          General Information    Existing Precautions/Restrictions fall; oxygen therapy device and L/min  -     Row Name 01/20/22 1321          Pain Scale: Numbers Pre/Post-Treatment    Pretreatment Pain Rating 0/10 - no pain  -     Posttreatment Pain Rating 0/10 - no pain  -     Row Name 01/20/22 1321          Pain Scale: Word Pre/Post-Treatment    Pain Intervention(s) Medication (See MAR)  -     Row Name 01/20/22 1321          Bed Mobility    Supine-Sit [place] (Bed Mobility) verbal cues; standby assist  -     Sit-Supine [place] (Bed Mobility) verbal cues; contact guard  -     Assistive Device (Bed Mobility) bed rails; head of bed elevated  -     Row Name 01/20/22 1321          Transfers    Comment (Transfers) pt defered t/fs d/t fatigue and nausea  -     Sit-Stand [place] (Transfers) verbal cues; minimum assist (75% patient effort)  -     Row Name 01/20/22 1321          Sit-Stand Transfer    Assistive Device (Sit-Stand Transfers) walker, front-wheeled  -     Row Name 01/20/22 1321          Aerobic Exercise    Comment, Aerobic Exercise (Therapeutic Exercise) BLE AROM x20 reps, ccues for attention to task and technique  -     Row Name 01/20/22 1321          Plan of Care Review     Plan of Care Reviewed With patient  -LC     Progress improving  -     Outcome Summary PT tx completed. Pt  SBA for supine to sit. Pt able to stand with min x1 and RWX, cues for increased anterior weight shift. Pt stood for 45 seconds then needed to sit back down d/t fatigue. Pt completed BLE AROM x20 rep seated, cues required for technique and attention to task. Pt refused further mobility d/t nausea and fatigue. Returned to bed with CGA. Will follow.  -     Row Name 01/20/22 1326          Positioning and Restraints    Pre-Treatment Position in bed  -LC     Post Treatment Position bed  -LC     In Bed fowlers; call light within reach; encouraged to call for assist  -LC           User Key  (r) = Recorded By, (t) = Taken By, (c) = Cosigned By    Initials Name Provider Type    LC Annie Young, PTA Physical Therapy Assistant                Physical Therapy Education                 Title: PT OT SLP Therapies (In Progress)     Topic: Physical Therapy (In Progress)     Point: Mobility training (Done)     Learning Progress Summary           Patient Acceptance, E,TB,D, VU,DU,NR by  at 1/18/2022 0929    Comment: Education re: purpose of PT/importance of activity, for improved tech w/ bed mobility, tfers and gait w/ rwx w/ emphasis on safety/falls prevention, proper breathing tech and pacing activity                   Point: Home exercise program (Not Started)     Learner Progress:  Not documented in this visit.          Point: Precautions (Done)     Learning Progress Summary           Patient Acceptance, E,TB,D, VU,DU,NR by  at 1/18/2022 0929    Comment: Education re: purpose of PT/importance of activity, for improved tech w/ bed mobility, tfers and gait w/ rwx w/ emphasis on safety/falls prevention, proper breathing tech and pacing activity                               User Key     Initials Effective Dates Name Provider Type Discipline     08/02/18 -  Maira Dominguez, PT Physical Therapist PT              PT  Recommendation and Plan     Plan of Care Reviewed With: patient  Progress: improving  Outcome Summary: PT tx completed. Pt  SBA for supine to sit. Pt able to stand with min x1 and RWX, cues for increased anterior weight shift. Pt stood for 45 seconds then needed to sit back down d/t fatigue. Pt completed BLE AROM x20 rep seated, cues required for technique and attention to task. Pt refused further mobility d/t nausea and fatigue. Returned to bed with CGA. Will follow.       Time Calculation:    PT Charges     Row Name 01/20/22 1350             Time Calculation    Start Time 1321  -LC      Stop Time 1344  -LC      Time Calculation (min) 23 min  -LC      PT Received On 01/20/22  -              Time Calculation- PT    Total Timed Code Minutes- PT 23 minute(s)  -LC              Timed Charges    49950 - PT Therapeutic Exercise Minutes 10  -LC      70488 - PT Therapeutic Activity Minutes 13  -LC              Total Minutes    Timed Charges Total Minutes 23  -LC       Total Minutes 23  -LC            User Key  (r) = Recorded By, (t) = Taken By, (c) = Cosigned By    Initials Name Provider Type     Annie Young PTA Physical Therapy Assistant              Therapy Charges for Today     Code Description Service Date Service Provider Modifiers Qty    15908260596 HC PT THERAPEUTIC ACT EA 15 MIN 1/19/2022 Annie Young PTA GP 1    63397982744 HC PT THER PROC EA 15 MIN 1/19/2022 Annie Young PTA GP 1    40144016172 HC PT THER PROC EA 15 MIN 1/20/2022 Annie Young PTA GP 1    47974678964 HC PT THERAPEUTIC ACT EA 15 MIN 1/20/2022 Annie Young PTA GP 1          PT G-Codes  Outcome Measure Options: AM-PAC 6 Clicks Basic Mobility (PT)  AM-PAC 6 Clicks Score (PT): 18  AM-PAC 6 Clicks Score (OT): 19    Annie Young PTA  1/20/2022

## 2022-01-21 NOTE — THERAPY DISCHARGE NOTE
Acute Care - Physical Therapy Discharge Summary  [facility]       Patient Name: Alissa Dominguez  : 1945  MRN: [id number]    Today's Date: 2022                 Admit Date: 2022      PT Recommendation and Plan    Visit Dx:    ICD-10-CM ICD-9-CM   1. Hypoxia  R09.02 799.02   2. Urinary tract infection without hematuria, site unspecified  N39.0 599.0   3. Impaired functional mobility and activity tolerance  Z74.09 V49.89   4. Decreased activities of daily living (ADL)  Z78.9 V49.89                PT Rehab Goals     Row Name 22 0726             Bed Mobility Goal 1 (PT)    Activity/Assistive Device (Bed Mobility Goal 1, PT) bed mobility activities, all  -AH      [place] Level/Cues Needed (Bed Mobility Goal 1, PT) standby assist; modified independence  -AH      Time Frame (Bed Mobility Goal 1, PT) long term goal (LTG); 10 days  -AH      Progress/Outcomes (Bed Mobility Goal 1, PT) goal not met  -AH              Transfer Goal 1 (PT)    Activity/Assistive Device (Transfer Goal 1, PT) sit-to-stand/stand-to-sit; bed-to-chair/chair-to-bed; walker, rolling  -AH      [place] Level/Cues Needed (Transfer Goal 1, PT) standby assist  -AH      Time Frame (Transfer Goal 1, PT) long term goal (LTG); 10 days  -AH      Progress/Outcome (Transfer Goal 1, PT) goal not met  -AH              Gait Training Goal 1 (PT)    Activity/Assistive Device (Gait Training Goal 1, PT) gait (walking locomotion); assistive device use; decrease fall risk; improve balance and speed; increase endurance/gait distance; increase energy conservation; walker, rolling  -AH      [place] Level (Gait Training Goal 1, PT) standby assist  -AH      Distance (Gait Training Goal 1, PT)  ft w/ less than or equal to 2 standing rest  -AH      Time Frame (Gait Training Goal 1, PT) long term goal (LTG); 10 days  -AH      Progress/Outcome (Gait Training Goal 1, PT) goal not met  -            User Key  (r) = Recorded By, (t) = Taken  By, (c) = Cosigned By    Initials Name Provider Type Discipline    Tran Sawyer, PTA Physical Therapy Assistant PT                    PT Discharge Summary  Reason for Discharge: Discharge from facility  Outcomes Achieved: Refer to plan of care for updates on goals achieved  Discharge Destination: SNF      Tran Castillo, DADA   1/21/2022

## 2022-01-21 NOTE — THERAPY DISCHARGE NOTE
Acute Care - Occupational Therapy Discharge Summary  [facility]     Patient Name: Alissa Dominguez  : 1945  MRN: [id number]    Today's Date: 2022                 Admit Date: 2022        OT Recommendation and Plan    Visit Dx:    ICD-10-CM ICD-9-CM   1. Hypoxia  R09.02 799.02   2. Urinary tract infection without hematuria, site unspecified  N39.0 599.0   3. Impaired functional mobility and activity tolerance  Z74.09 V49.89   4. Decreased activities of daily living (ADL)  Z78.9 V49.89                OT Rehab Goals     Row Name 22 0726             Dressing Goal 1 (OT)    Activity/Device (Dressing Goal 1, OT) lower body dressing  -TS      [place]/Cues Needed (Dressing Goal 1, OT) supervision required  -TS      Time Frame (Dressing Goal 1, OT) long term goal (LTG); 10 days  -TS      Progress/Outcome (Dressing Goal 1, OT) goal not met  -TS              Toileting Goal 1 (OT)    Activity/Device (Toileting Goal 1, OT) toileting skills, all; commode  -TS      [place] Level/Cues Needed (Toileting Goal 1, OT) supervision required  -TS      Time Frame (Toileting Goal 1, OT) long term goal (LTG); 10 days  -TS      Progress/Outcome (Toileting Goal 1, OT) goal not met  -TS              Problem Specific Goal 1 (OT)    Problem Specific Goal 1 (OT) Pt will demos safety awareness in ADL tasks with 90% accuracy to increase safety compliance and independence in ADLs in 10 days.  -TS      Time Frame (Problem Specific Goal 1, OT) long term goal (LTG); 10 days  -TS      Progress/Outcome (Problem Specific Goal 1, OT) goal not met  -TS            User Key  (r) = Recorded By, (t) = Taken By, (c) = Cosigned By    Initials Name Provider Type Discipline    Val Courtney COTA Occupational Therapy Assistant OT                    Timed Therapy Charges  Total Units: 2    Charges  Total Units: 2    Procedure Name Documented Minutes Units Code    HC OT THER PROC EA 15 MIN 15  1    65736 (CPT®)      HC OT  THERAPEUTIC ACT EA 15 MIN 10  1    00101 (CPT®)               Documented Minutes  Total Minutes: 25    Therapy Provided Minutes    53355 - OT Therapeutic Exercise Minutes 15    86111 - OT Therapeutic Activity Minutes 10                    OT Discharge Summary  Anticipated Discharge Disposition (OT): skilled nursing facility  Reason for Discharge: Discharge from facility  Outcomes Achieved: Refer to plan of care for updates on goals achieved  Discharge Destination: SNF      NARA Khanna  1/21/2022

## 2022-02-02 DIAGNOSIS — F41.9 ANXIETY: Primary | ICD-10-CM

## 2022-02-02 RX ORDER — LORAZEPAM 0.5 MG/1
0.5 TABLET ORAL 2 TIMES DAILY
Qty: 60 TABLET | Refills: 0 | Status: SHIPPED | OUTPATIENT
Start: 2022-02-02 | End: 2022-03-04

## 2022-02-11 ENCOUNTER — TELEPHONE (OUTPATIENT)
Dept: INTERNAL MEDICINE | Age: 77
End: 2022-02-11

## 2022-02-11 NOTE — TELEPHONE ENCOUNTER
Scott Mora, RN for hospice said she has been unable to reach Dr. Linda Castellano but is needing orders. She reports feeling the patient is transitioning to end of life stage. She said the patient is pocketing and choking on food. The patient is unable to get her meds due to this and would like a call from Dr. Linda Castellano.

## 2022-02-11 NOTE — TELEPHONE ENCOUNTER
Her to stop all of her medications other than comfort measures.  I do have her number to call her back

## 2022-02-21 ENCOUNTER — TELEPHONE (OUTPATIENT)
Dept: PODIATRY | Facility: CLINIC | Age: 77
End: 2022-02-21

## 2022-02-21 NOTE — TELEPHONE ENCOUNTER
Outbound call to ptlaisha LM for return call to reschedule appointment that was cancelled on 01/25/22 with Jay.  If pt calls back please reschedule at that time.

## (undated) DEVICE — GLV SURG SENSICARE W/ALOE PF LF 6.5 STRL

## (undated) DEVICE — ANTIBACTERIAL UNDYED BRAIDED (POLYGLACTIN 910), SYNTHETIC ABSORBABLE SUTURE: Brand: COATED VICRYL

## (undated) DEVICE — PK TURNOVER RM ADV

## (undated) DEVICE — RESERVOIR,SUCTION,100CC,SILICONE: Brand: MEDLINE

## (undated) DEVICE — GLV SURG SENSICARE W/ALOE PF LF SZ6 STRL

## (undated) DEVICE — CVR PROB GEN PURP W ISOSILK 6X48

## (undated) DEVICE — SUT SILK 2/0 SH CR8 18IN CR8 C012D

## (undated) DEVICE — SUT SILK 2/0 SH 30IN K833H

## (undated) DEVICE — Device

## (undated) DEVICE — APPL CHLORAPREP HI/LITE 26ML ORNG

## (undated) DEVICE — DRN JP RND NO TROC SIL 15F 3/16IN

## (undated) DEVICE — PAD MINOR UNIVERSAL: Brand: MEDLINE INDUSTRIES, INC.

## (undated) DEVICE — SYR CONTRL LUERLOK 10CC

## (undated) DEVICE — ELECTRD BLD EZ CLN MOD XLNG 2.75IN

## (undated) DEVICE — SUT SILK 2/0 CX1 18IN 737G

## (undated) DEVICE — SUT MNCRYL 4/0 PS2 27IN UD MCP426H

## (undated) DEVICE — NDL HYPO PRECISIONGLIDE REG 25G 1 1/2

## (undated) DEVICE — SYR PRECISIONGLIDE LL 5CC 20X1 1/2IN

## (undated) DEVICE — ELECTRD BLD EZ CLN STD 2.5IN

## (undated) DEVICE — FRCP GRASP BABCOCK 6IN DISP STRL

## (undated) DEVICE — 3M™ IOBAN™ 2 ANTIMICROBIAL INCISE DRAPE 6650EZ: Brand: IOBAN™ 2

## (undated) DEVICE — PROXIMATE RH ROTATING HEAD SKIN STAPLERS (35 WIDE) CONTAINS 35 STAINLESS STEEL STAPLES: Brand: PROXIMATE

## (undated) DEVICE — ADHS SKIN PREMIERPRO EXOFIN TOPICAL HI/VISC .5ML